# Patient Record
Sex: MALE | Race: WHITE | NOT HISPANIC OR LATINO | Employment: UNEMPLOYED | URBAN - NONMETROPOLITAN AREA
[De-identification: names, ages, dates, MRNs, and addresses within clinical notes are randomized per-mention and may not be internally consistent; named-entity substitution may affect disease eponyms.]

---

## 2017-01-01 ENCOUNTER — LAB REQUISITION (OUTPATIENT)
Dept: LAB | Facility: HOSPITAL | Age: 77
End: 2017-01-01

## 2017-01-01 ENCOUNTER — APPOINTMENT (OUTPATIENT)
Dept: CT IMAGING | Facility: HOSPITAL | Age: 77
End: 2017-01-01

## 2017-01-01 ENCOUNTER — APPOINTMENT (OUTPATIENT)
Dept: ULTRASOUND IMAGING | Facility: HOSPITAL | Age: 77
End: 2017-01-01

## 2017-01-01 ENCOUNTER — ANESTHESIA EVENT (OUTPATIENT)
Dept: PERIOP | Facility: HOSPITAL | Age: 77
End: 2017-01-01

## 2017-01-01 ENCOUNTER — APPOINTMENT (OUTPATIENT)
Dept: GENERAL RADIOLOGY | Facility: HOSPITAL | Age: 77
End: 2017-01-01

## 2017-01-01 ENCOUNTER — HOSPITAL ENCOUNTER (EMERGENCY)
Facility: HOSPITAL | Age: 77
Discharge: HOME | End: 2017-04-23
Attending: EMERGENCY MEDICINE | Admitting: EMERGENCY MEDICINE

## 2017-01-01 ENCOUNTER — OFFICE VISIT (OUTPATIENT)
Dept: ORTHOPEDIC SURGERY | Facility: CLINIC | Age: 77
End: 2017-01-01

## 2017-01-01 ENCOUNTER — HOSPITAL ENCOUNTER (INPATIENT)
Facility: HOSPITAL | Age: 77
LOS: 5 days | Discharge: NURSING, REHAB OR LONG-TERM CARE | End: 2017-08-18
Attending: EMERGENCY MEDICINE | Admitting: FAMILY MEDICINE

## 2017-01-01 ENCOUNTER — ANESTHESIA (OUTPATIENT)
Dept: PERIOP | Facility: HOSPITAL | Age: 77
End: 2017-01-01

## 2017-01-01 ENCOUNTER — HOSPITAL ENCOUNTER (OUTPATIENT)
Dept: CT IMAGING | Facility: HOSPITAL | Age: 77
Discharge: HOME | End: 2017-09-12
Admitting: FAMILY MEDICINE

## 2017-01-01 ENCOUNTER — LAB (OUTPATIENT)
Dept: LAB | Facility: HOSPITAL | Age: 77
End: 2017-01-01

## 2017-01-01 VITALS
SYSTOLIC BLOOD PRESSURE: 118 MMHG | RESPIRATION RATE: 20 BRPM | TEMPERATURE: 97.8 F | BODY MASS INDEX: 31.67 KG/M2 | HEIGHT: 75 IN | HEART RATE: 77 BPM | OXYGEN SATURATION: 94 % | DIASTOLIC BLOOD PRESSURE: 58 MMHG | WEIGHT: 254.7 LBS

## 2017-01-01 VITALS
TEMPERATURE: 97.7 F | SYSTOLIC BLOOD PRESSURE: 218 MMHG | OXYGEN SATURATION: 96 % | RESPIRATION RATE: 20 BRPM | WEIGHT: 246 LBS | HEART RATE: 95 BPM | HEIGHT: 75 IN | DIASTOLIC BLOOD PRESSURE: 107 MMHG | BODY MASS INDEX: 30.59 KG/M2

## 2017-01-01 DIAGNOSIS — N13.30 HYDRONEPHROSIS, BILATERAL: ICD-10-CM

## 2017-01-01 DIAGNOSIS — R33.9 RETENTION OF URINE, UNSPECIFIED: Primary | ICD-10-CM

## 2017-01-01 DIAGNOSIS — Z51.81 ENCOUNTER FOR THERAPEUTIC DRUG LEVEL MONITORING: ICD-10-CM

## 2017-01-01 DIAGNOSIS — R79.82 ELEVATED C-REACTIVE PROTEIN (CRP): ICD-10-CM

## 2017-01-01 DIAGNOSIS — R68.89 OTHER GENERAL SYMPTOMS AND SIGNS: ICD-10-CM

## 2017-01-01 DIAGNOSIS — L03.116 CELLULITIS OF KNEE, LEFT: ICD-10-CM

## 2017-01-01 DIAGNOSIS — R23.8 LOCALIZED WARMTH OF SKIN: ICD-10-CM

## 2017-01-01 DIAGNOSIS — L03.116 CELLULITIS OF KNEE, LEFT: Primary | ICD-10-CM

## 2017-01-01 DIAGNOSIS — R79.89 OTHER SPECIFIED ABNORMAL FINDINGS OF BLOOD CHEMISTRY: ICD-10-CM

## 2017-01-01 DIAGNOSIS — R31.9 HEMATURIA: ICD-10-CM

## 2017-01-01 DIAGNOSIS — E87.1 HYPONATREMIA: ICD-10-CM

## 2017-01-01 DIAGNOSIS — N39.0 URINARY TRACT INFECTION: ICD-10-CM

## 2017-01-01 DIAGNOSIS — M25.462 EFFUSION OF LEFT KNEE: ICD-10-CM

## 2017-01-01 DIAGNOSIS — L03.90 CELLULITIS: ICD-10-CM

## 2017-01-01 DIAGNOSIS — M25.562 PAIN, JOINT, KNEE, LEFT: ICD-10-CM

## 2017-01-01 DIAGNOSIS — T83.511A URINARY TRACT INFECTION ASSOCIATED WITH INDWELLING URETHRAL CATHETER, INITIAL ENCOUNTER (HCC): Primary | ICD-10-CM

## 2017-01-01 DIAGNOSIS — R52 PAIN: ICD-10-CM

## 2017-01-01 DIAGNOSIS — L03.116 CELLULITIS OF LEG, LEFT: Primary | ICD-10-CM

## 2017-01-01 DIAGNOSIS — I10 POORLY-CONTROLLED HYPERTENSION: ICD-10-CM

## 2017-01-01 DIAGNOSIS — J18.9 PNEUMONIA OF BOTH LUNGS DUE TO INFECTIOUS ORGANISM, UNSPECIFIED PART OF LUNG: ICD-10-CM

## 2017-01-01 DIAGNOSIS — L03.116 LEFT LEG CELLULITIS: ICD-10-CM

## 2017-01-01 DIAGNOSIS — N39.0 URINARY TRACT INFECTION ASSOCIATED WITH INDWELLING URETHRAL CATHETER, INITIAL ENCOUNTER (HCC): Primary | ICD-10-CM

## 2017-01-01 DIAGNOSIS — N17.9 ACUTE RENAL FAILURE, UNSPECIFIED ACUTE RENAL FAILURE TYPE (HCC): ICD-10-CM

## 2017-01-01 DIAGNOSIS — L53.9 REDNESS: ICD-10-CM

## 2017-01-01 LAB
ALBUMIN SERPL-MCNC: 3.4 G/DL (ref 3.4–4.8)
ALBUMIN SERPL-MCNC: 4.2 G/DL (ref 3.4–4.8)
ALBUMIN/GLOB SERPL: 0.8 G/DL (ref 1.1–1.8)
ALBUMIN/GLOB SERPL: 1 G/DL (ref 1.1–1.8)
ALP SERPL-CCNC: 195 U/L (ref 38–126)
ALP SERPL-CCNC: 251 U/L (ref 38–126)
ALT SERPL W P-5'-P-CCNC: 27 U/L (ref 21–72)
ALT SERPL W P-5'-P-CCNC: 41 U/L (ref 21–72)
ANION GAP SERPL CALCULATED.3IONS-SCNC: 10 MMOL/L (ref 5–15)
ANION GAP SERPL CALCULATED.3IONS-SCNC: 11 MMOL/L (ref 5–15)
ANION GAP SERPL CALCULATED.3IONS-SCNC: 13 MMOL/L (ref 5–15)
ANION GAP SERPL CALCULATED.3IONS-SCNC: 14 MMOL/L (ref 5–15)
ANION GAP SERPL CALCULATED.3IONS-SCNC: 8 MMOL/L (ref 5–15)
ANION GAP SERPL CALCULATED.3IONS-SCNC: 9 MMOL/L (ref 5–15)
ANION GAP SERPL CALCULATED.3IONS-SCNC: 9 MMOL/L (ref 5–15)
ANISOCYTOSIS BLD QL: NORMAL
APPEARANCE FLD: ABNORMAL
APTT PPP: 42.1 SECONDS (ref 20–40.3)
ARTERIAL PATENCY WRIST A: ABNORMAL
AST SERPL-CCNC: 27 U/L (ref 17–59)
AST SERPL-CCNC: 35 U/L (ref 17–59)
ATMOSPHERIC PRESS: ABNORMAL MMHG
BACTERIA FLD CULT: ABNORMAL
BACTERIA SPEC AEROBE CULT: ABNORMAL
BACTERIA SPEC AEROBE CULT: ABNORMAL
BACTERIA SPEC AEROBE CULT: NORMAL
BACTERIA UR QL AUTO: ABNORMAL /HPF
BACTERIA UR QL AUTO: ABNORMAL /HPF
BASE EXCESS BLDA CALC-SCNC: 0.4 MMOL/L (ref -2.4–2.4)
BASOPHILS # BLD AUTO: 0.01 10*3/MM3 (ref 0–0.2)
BASOPHILS # BLD AUTO: 0.02 10*3/MM3 (ref 0–0.2)
BASOPHILS # BLD AUTO: 0.03 10*3/MM3 (ref 0–0.2)
BASOPHILS # BLD AUTO: 0.04 10*3/MM3 (ref 0–0.2)
BASOPHILS # BLD AUTO: 0.04 10*3/MM3 (ref 0–0.2)
BASOPHILS # BLD AUTO: 0.05 10*3/MM3 (ref 0–0.2)
BASOPHILS # BLD AUTO: 0.05 10*3/MM3 (ref 0–0.2)
BASOPHILS # BLD AUTO: 0.07 10*3/MM3 (ref 0–0.2)
BASOPHILS NFR BLD AUTO: 0.1 % (ref 0–2)
BASOPHILS NFR BLD AUTO: 0.2 % (ref 0–2)
BASOPHILS NFR BLD AUTO: 0.3 % (ref 0–2)
BASOPHILS NFR BLD AUTO: 0.4 % (ref 0–2)
BASOPHILS NFR BLD AUTO: 0.4 % (ref 0–2)
BASOPHILS NFR BLD AUTO: 0.5 % (ref 0–2)
BASOPHILS NFR BLD AUTO: 0.6 % (ref 0–2)
BASOPHILS NFR BLD AUTO: 0.6 % (ref 0–2)
BASOPHILS NFR BLD AUTO: 0.7 % (ref 0–2)
BASOPHILS NFR BLD AUTO: 0.8 % (ref 0–2)
BDY SITE: ABNORMAL
BILIRUB SERPL-MCNC: 0.6 MG/DL (ref 0.2–1.3)
BILIRUB SERPL-MCNC: 0.8 MG/DL (ref 0.2–1.3)
BILIRUB UR QL STRIP: ABNORMAL
BILIRUB UR QL STRIP: NEGATIVE
BUN BLD-MCNC: 27 MG/DL (ref 7–21)
BUN BLD-MCNC: 34 MG/DL (ref 7–21)
BUN BLD-MCNC: 40 MG/DL (ref 7–21)
BUN BLD-MCNC: 48 MG/DL (ref 7–21)
BUN BLD-MCNC: 58 MG/DL (ref 7–21)
BUN BLD-MCNC: 68 MG/DL (ref 7–21)
BUN BLD-MCNC: 69 MG/DL (ref 7–21)
BUN BLD-MCNC: 69 MG/DL (ref 7–21)
BUN BLD-MCNC: 70 MG/DL (ref 7–21)
BUN BLD-MCNC: 71 MG/DL (ref 7–21)
BUN BLD-MCNC: 71 MG/DL (ref 7–21)
BUN BLD-MCNC: 72 MG/DL (ref 7–21)
BUN BLD-MCNC: 73 MG/DL (ref 7–21)
BUN/CREAT SERPL: 18.1 (ref 7–25)
BUN/CREAT SERPL: 18.5 (ref 7–25)
BUN/CREAT SERPL: 19 (ref 7–25)
BUN/CREAT SERPL: 19.9 (ref 7–25)
BUN/CREAT SERPL: 20.7 (ref 7–25)
BUN/CREAT SERPL: 20.8 (ref 7–25)
BUN/CREAT SERPL: 21.6 (ref 7–25)
BUN/CREAT SERPL: 23.8 (ref 7–25)
BUN/CREAT SERPL: 26.6 (ref 7–25)
BUN/CREAT SERPL: 28.2 (ref 7–25)
BUN/CREAT SERPL: 30.6 (ref 7–25)
BUN/CREAT SERPL: 33.7 (ref 7–25)
CA-I BLD-MCNC: 4.6 MG/DL (ref 4.5–4.9)
CALCIUM SPEC-SCNC: 10 MG/DL (ref 8.4–10.2)
CALCIUM SPEC-SCNC: 8.7 MG/DL (ref 8.4–10.2)
CALCIUM SPEC-SCNC: 8.8 MG/DL (ref 8.4–10.2)
CALCIUM SPEC-SCNC: 8.8 MG/DL (ref 8.4–10.2)
CALCIUM SPEC-SCNC: 9.1 MG/DL (ref 8.4–10.2)
CALCIUM SPEC-SCNC: 9.2 MG/DL (ref 8.4–10.2)
CALCIUM SPEC-SCNC: 9.2 MG/DL (ref 8.4–10.2)
CALCIUM SPEC-SCNC: 9.3 MG/DL (ref 8.4–10.2)
CALCIUM SPEC-SCNC: 9.4 MG/DL (ref 8.4–10.2)
CALCIUM SPEC-SCNC: 9.4 MG/DL (ref 8.4–10.2)
CHLORIDE SERPL-SCNC: 101 MMOL/L (ref 95–110)
CHLORIDE SERPL-SCNC: 103 MMOL/L (ref 95–110)
CHLORIDE SERPL-SCNC: 104 MMOL/L (ref 95–110)
CHLORIDE SERPL-SCNC: 84 MMOL/L (ref 95–110)
CHLORIDE SERPL-SCNC: 89 MMOL/L (ref 95–110)
CHLORIDE SERPL-SCNC: 92 MMOL/L (ref 95–110)
CHLORIDE SERPL-SCNC: 95 MMOL/L (ref 95–110)
CHLORIDE SERPL-SCNC: 95 MMOL/L (ref 95–110)
CHLORIDE SERPL-SCNC: 96 MMOL/L (ref 95–110)
CHLORIDE SERPL-SCNC: 99 MMOL/L (ref 95–110)
CK MB SERPL-CCNC: 0.84 NG/ML (ref 0–5)
CK SERPL-CCNC: 25 U/L (ref 55–170)
CLARITY UR: ABNORMAL
CLUMPED PLATELETS: PRESENT
CO2 BLDA-SCNC: 26 MMOL/L (ref 23–27)
CO2 SERPL-SCNC: 19 MMOL/L (ref 22–31)
CO2 SERPL-SCNC: 23 MMOL/L (ref 22–31)
CO2 SERPL-SCNC: 24 MMOL/L (ref 22–31)
CO2 SERPL-SCNC: 24 MMOL/L (ref 22–31)
CO2 SERPL-SCNC: 25 MMOL/L (ref 22–31)
CO2 SERPL-SCNC: 26 MMOL/L (ref 22–31)
CO2 SERPL-SCNC: 26 MMOL/L (ref 22–31)
CO2 SERPL-SCNC: 27 MMOL/L (ref 22–31)
COLOR FLD: ABNORMAL
COLOR UR: ABNORMAL
COLOR UR: YELLOW
CRE SCREEN PCR: NOT DETECTED
CREAT BLD-MCNC: 1.01 MG/DL (ref 0.7–1.3)
CREAT BLD-MCNC: 1.08 MG/DL (ref 0.7–1.3)
CREAT BLD-MCNC: 1.42 MG/DL (ref 0.7–1.3)
CREAT BLD-MCNC: 1.57 MG/DL (ref 0.7–1.3)
CREAT BLD-MCNC: 2.18 MG/DL (ref 0.7–1.3)
CREAT BLD-MCNC: 2.9 MG/DL (ref 0.7–1.3)
CREAT BLD-MCNC: 3.19 MG/DL (ref 0.7–1.3)
CREAT BLD-MCNC: 3.42 MG/DL (ref 0.7–1.3)
CREAT BLD-MCNC: 3.52 MG/DL (ref 0.7–1.3)
CREAT BLD-MCNC: 3.57 MG/DL (ref 0.7–1.3)
CREAT BLD-MCNC: 3.75 MG/DL (ref 0.7–1.3)
CREAT BLD-MCNC: 3.78 MG/DL (ref 0.7–1.3)
CREAT BLD-MCNC: 3.79 MG/DL (ref 0.7–1.3)
CREAT UR-MCNC: 40.7 MG/DL
CRP SERPL-MCNC: 14.1 MG/DL (ref 0–1)
CRP SERPL-MCNC: 14.5 MG/DL (ref 0–1)
CRP SERPL-MCNC: 15.8 MG/DL (ref 0–1)
CRP SERPL-MCNC: 20.8 MG/DL (ref 0–1)
CRP SERPL-MCNC: 32 MG/DL (ref 0–1)
CRP SERPL-MCNC: 32.9 MG/DL (ref 0–1)
CRP SERPL-MCNC: 6.9 MG/DL (ref 0–1)
CRYSTALS FLD MICRO: NORMAL
D-LACTATE SERPL-SCNC: 0.8 MMOL/L (ref 0.5–2)
DEPRECATED RDW RBC AUTO: 48.8 FL (ref 35.1–43.9)
DEPRECATED RDW RBC AUTO: 50 FL (ref 35.1–43.9)
DEPRECATED RDW RBC AUTO: 50.2 FL (ref 35.1–43.9)
DEPRECATED RDW RBC AUTO: 50.3 FL (ref 35.1–43.9)
DEPRECATED RDW RBC AUTO: 52.5 FL (ref 35.1–43.9)
DEPRECATED RDW RBC AUTO: 53.2 FL (ref 35.1–43.9)
DEPRECATED RDW RBC AUTO: 54.9 FL (ref 35.1–43.9)
DEPRECATED RDW RBC AUTO: 55.2 FL (ref 35.1–43.9)
DEPRECATED RDW RBC AUTO: 55.3 FL (ref 35.1–43.9)
DEPRECATED RDW RBC AUTO: 55.9 FL (ref 35.1–43.9)
DEPRECATED RDW RBC AUTO: 56.3 FL (ref 35.1–43.9)
DEPRECATED RDW RBC AUTO: 58.4 FL (ref 35.1–43.9)
EOSINOPHIL # BLD AUTO: 0.01 10*3/MM3 (ref 0–0.7)
EOSINOPHIL # BLD AUTO: 0.01 10*3/MM3 (ref 0–0.7)
EOSINOPHIL # BLD AUTO: 0.04 10*3/MM3 (ref 0–0.7)
EOSINOPHIL # BLD AUTO: 0.21 10*3/MM3 (ref 0–0.7)
EOSINOPHIL # BLD AUTO: 0.21 10*3/MM3 (ref 0–0.7)
EOSINOPHIL # BLD AUTO: 0.23 10*3/MM3 (ref 0–0.7)
EOSINOPHIL # BLD AUTO: 0.27 10*3/MM3 (ref 0–0.7)
EOSINOPHIL # BLD AUTO: 0.3 10*3/MM3 (ref 0–0.7)
EOSINOPHIL # BLD AUTO: 0.31 10*3/MM3 (ref 0–0.7)
EOSINOPHIL # BLD AUTO: 0.32 10*3/MM3 (ref 0–0.7)
EOSINOPHIL # BLD AUTO: 0.34 10*3/MM3 (ref 0–0.7)
EOSINOPHIL # BLD AUTO: 0.39 10*3/MM3 (ref 0–0.7)
EOSINOPHIL NFR BLD AUTO: 0.1 % (ref 0–7)
EOSINOPHIL NFR BLD AUTO: 0.1 % (ref 0–7)
EOSINOPHIL NFR BLD AUTO: 0.5 % (ref 0–7)
EOSINOPHIL NFR BLD AUTO: 2 % (ref 0–7)
EOSINOPHIL NFR BLD AUTO: 2.2 % (ref 0–7)
EOSINOPHIL NFR BLD AUTO: 2.9 % (ref 0–7)
EOSINOPHIL NFR BLD AUTO: 3.2 % (ref 0–7)
EOSINOPHIL NFR BLD AUTO: 3.8 % (ref 0–7)
EOSINOPHIL NFR BLD AUTO: 4.2 % (ref 0–7)
EOSINOPHIL NFR BLD AUTO: 4.3 % (ref 0–7)
EOSINOPHIL NFR BLD AUTO: 4.9 % (ref 0–7)
EOSINOPHIL NFR BLD AUTO: 6 % (ref 0–7)
ERYTHROCYTE [DISTWIDTH] IN BLOOD BY AUTOMATED COUNT: 16.3 % (ref 11.5–14.5)
ERYTHROCYTE [DISTWIDTH] IN BLOOD BY AUTOMATED COUNT: 16.6 % (ref 11.5–14.5)
ERYTHROCYTE [DISTWIDTH] IN BLOOD BY AUTOMATED COUNT: 17.2 % (ref 11.5–14.5)
ERYTHROCYTE [DISTWIDTH] IN BLOOD BY AUTOMATED COUNT: 17.3 % (ref 11.5–14.5)
ERYTHROCYTE [DISTWIDTH] IN BLOOD BY AUTOMATED COUNT: 17.3 % (ref 11.5–14.5)
ERYTHROCYTE [DISTWIDTH] IN BLOOD BY AUTOMATED COUNT: 17.6 % (ref 11.5–14.5)
ERYTHROCYTE [DISTWIDTH] IN BLOOD BY AUTOMATED COUNT: 17.7 % (ref 11.5–14.5)
ERYTHROCYTE [DISTWIDTH] IN BLOOD BY AUTOMATED COUNT: 18 % (ref 11.5–14.5)
ERYTHROCYTE [DISTWIDTH] IN BLOOD BY AUTOMATED COUNT: 18.3 % (ref 11.5–14.5)
ERYTHROCYTE [DISTWIDTH] IN BLOOD BY AUTOMATED COUNT: 18.3 % (ref 11.5–14.5)
ERYTHROCYTE [DISTWIDTH] IN BLOOD BY AUTOMATED COUNT: 18.4 % (ref 11.5–14.5)
ERYTHROCYTE [DISTWIDTH] IN BLOOD BY AUTOMATED COUNT: 19.2 % (ref 11.5–14.5)
ERYTHROCYTE [SEDIMENTATION RATE] IN BLOOD: 125 MM/HR (ref 0–15)
FERRITIN SERPL-MCNC: 175 NG/ML (ref 17.9–464)
FOLATE SERPL-MCNC: 16.2 NG/ML (ref 2.76–21)
GFR SERPL CREATININE-BSD FRML MDRD: 15 ML/MIN/1.73 (ref 60–98)
GFR SERPL CREATININE-BSD FRML MDRD: 16 ML/MIN/1.73 (ref 42–98)
GFR SERPL CREATININE-BSD FRML MDRD: 16 ML/MIN/1.73 (ref 42–98)
GFR SERPL CREATININE-BSD FRML MDRD: 17 ML/MIN/1.73 (ref 42–98)
GFR SERPL CREATININE-BSD FRML MDRD: 19 ML/MIN/1.73 (ref 42–98)
GFR SERPL CREATININE-BSD FRML MDRD: 21 ML/MIN/1.73 (ref 42–98)
GFR SERPL CREATININE-BSD FRML MDRD: 29 ML/MIN/1.73 (ref 42–98)
GFR SERPL CREATININE-BSD FRML MDRD: 43 ML/MIN/1.73 (ref 60–98)
GFR SERPL CREATININE-BSD FRML MDRD: 48 ML/MIN/1.73 (ref 42–98)
GFR SERPL CREATININE-BSD FRML MDRD: 66 ML/MIN/1.73 (ref 42–98)
GFR SERPL CREATININE-BSD FRML MDRD: 71 ML/MIN/1.73 (ref 60–98)
GLOBULIN UR ELPH-MCNC: 4.1 GM/DL (ref 2.3–3.5)
GLOBULIN UR ELPH-MCNC: 4.4 GM/DL (ref 2.3–3.5)
GLUCOSE BLD-MCNC: 112 MG/DL (ref 60–100)
GLUCOSE BLD-MCNC: 116 MG/DL (ref 60–100)
GLUCOSE BLD-MCNC: 136 MG/DL (ref 60–100)
GLUCOSE BLD-MCNC: 143 MG/DL (ref 60–100)
GLUCOSE BLD-MCNC: 144 MG/DL (ref 60–100)
GLUCOSE BLD-MCNC: 150 MG/DL (ref 60–100)
GLUCOSE BLD-MCNC: 166 MG/DL (ref 60–100)
GLUCOSE BLD-MCNC: 170 MG/DL (ref 60–100)
GLUCOSE BLD-MCNC: 170 MG/DL (ref 60–100)
GLUCOSE BLD-MCNC: 92 MG/DL (ref 60–100)
GLUCOSE BLD-MCNC: 96 MG/DL (ref 60–100)
GLUCOSE BLD-MCNC: 97 MG/DL (ref 60–100)
GLUCOSE BLDA-MCNC: 138 MMOL/L
GLUCOSE UR STRIP-MCNC: NEGATIVE MG/DL
GRAM STN SPEC: ABNORMAL
GRAM STN SPEC: ABNORMAL
HCO3 BLDA-SCNC: 24.8 MMOL/L (ref 22–26)
HCT VFR BLD AUTO: 32.4 % (ref 39–49)
HCT VFR BLD AUTO: 32.8 % (ref 39–49)
HCT VFR BLD AUTO: 33.1 % (ref 39–49)
HCT VFR BLD AUTO: 33.3 % (ref 39–49)
HCT VFR BLD AUTO: 33.7 % (ref 39–49)
HCT VFR BLD AUTO: 34.8 % (ref 39–49)
HCT VFR BLD AUTO: 35.1 % (ref 39–49)
HCT VFR BLD AUTO: 35.1 % (ref 39–49)
HCT VFR BLD AUTO: 36.4 % (ref 39–49)
HCT VFR BLD AUTO: 36.8 % (ref 39–49)
HCT VFR BLD AUTO: 38 % (ref 39–49)
HCT VFR BLD AUTO: 42.5 % (ref 39–49)
HCT VFR BLD CALC: 36 % (ref 40–54)
HGB BLD-MCNC: 10.3 G/DL (ref 13.7–17.3)
HGB BLD-MCNC: 10.9 G/DL (ref 13.7–17.3)
HGB BLD-MCNC: 11.5 G/DL (ref 13.7–17.3)
HGB BLD-MCNC: 11.6 G/DL (ref 13.7–17.3)
HGB BLD-MCNC: 11.9 G/DL (ref 13.7–17.3)
HGB BLD-MCNC: 12 G/DL (ref 13.7–17.3)
HGB BLD-MCNC: 12.5 G/DL (ref 13.7–17.3)
HGB BLD-MCNC: 14 G/DL (ref 13.7–17.3)
HGB BLDA-MCNC: 12.4 G/DL (ref 14–18)
HGB UR QL STRIP.AUTO: ABNORMAL
HOLD SPECIMEN: NORMAL
HYALINE CASTS UR QL AUTO: ABNORMAL /LPF
HYALINE CASTS UR QL AUTO: ABNORMAL /LPF
HYPOCHROMIA BLD QL: NORMAL
HYPOCHROMIA BLD QL: NORMAL
IMM GRANULOCYTES # BLD: 0.01 10*3/MM3 (ref 0–0.02)
IMM GRANULOCYTES # BLD: 0.02 10*3/MM3 (ref 0–0.02)
IMM GRANULOCYTES # BLD: 0.03 10*3/MM3 (ref 0–0.02)
IMM GRANULOCYTES # BLD: 0.05 10*3/MM3 (ref 0–0.02)
IMM GRANULOCYTES # BLD: 0.11 10*3/MM3 (ref 0–0.02)
IMM GRANULOCYTES NFR BLD: 0.1 % (ref 0–0.5)
IMM GRANULOCYTES NFR BLD: 0.1 % (ref 0–0.5)
IMM GRANULOCYTES NFR BLD: 0.2 % (ref 0–0.5)
IMM GRANULOCYTES NFR BLD: 0.3 % (ref 0–0.5)
IMM GRANULOCYTES NFR BLD: 0.3 % (ref 0–0.5)
IMM GRANULOCYTES NFR BLD: 0.4 % (ref 0–0.5)
IMM GRANULOCYTES NFR BLD: 0.4 % (ref 0–0.5)
IMM GRANULOCYTES NFR BLD: 1.2 % (ref 0–0.5)
IMP STRAIN: NOT DETECTED
INR PPP: 1.09 (ref 0.8–1.2)
IRON 24H UR-MRATE: 28 MCG/DL (ref 49–181)
IRON SATN MFR SERPL: 15 % (ref 20–55)
KETONES UR QL STRIP: ABNORMAL
KETONES UR QL STRIP: NEGATIVE
KPC STRAIN: NOT DETECTED
LARGE PLATELETS: NORMAL
LEUKOCYTE ESTERASE UR QL STRIP.AUTO: ABNORMAL
LIPASE SERPL-CCNC: 49 U/L (ref 23–300)
LYMPHOCYTES # BLD AUTO: 0.59 10*3/MM3 (ref 0.6–4.2)
LYMPHOCYTES # BLD AUTO: 0.68 10*3/MM3 (ref 0.6–4.2)
LYMPHOCYTES # BLD AUTO: 0.71 10*3/MM3 (ref 0.6–4.2)
LYMPHOCYTES # BLD AUTO: 0.88 10*3/MM3 (ref 0.6–4.2)
LYMPHOCYTES # BLD AUTO: 0.94 10*3/MM3 (ref 0.6–4.2)
LYMPHOCYTES # BLD AUTO: 1 10*3/MM3 (ref 0.6–4.2)
LYMPHOCYTES # BLD AUTO: 1.07 10*3/MM3 (ref 0.6–4.2)
LYMPHOCYTES # BLD AUTO: 1.13 10*3/MM3 (ref 0.6–4.2)
LYMPHOCYTES # BLD AUTO: 1.31 10*3/MM3 (ref 0.6–4.2)
LYMPHOCYTES # BLD AUTO: 1.38 10*3/MM3 (ref 0.6–4.2)
LYMPHOCYTES # BLD AUTO: 1.57 10*3/MM3 (ref 0.6–4.2)
LYMPHOCYTES # BLD AUTO: 1.68 10*3/MM3 (ref 0.6–4.2)
LYMPHOCYTES NFR BLD AUTO: 14.4 % (ref 10–50)
LYMPHOCYTES NFR BLD AUTO: 14.5 % (ref 10–50)
LYMPHOCYTES NFR BLD AUTO: 15.5 % (ref 10–50)
LYMPHOCYTES NFR BLD AUTO: 15.7 % (ref 10–50)
LYMPHOCYTES NFR BLD AUTO: 17.4 % (ref 10–50)
LYMPHOCYTES NFR BLD AUTO: 20.4 % (ref 10–50)
LYMPHOCYTES NFR BLD AUTO: 22.6 % (ref 10–50)
LYMPHOCYTES NFR BLD AUTO: 4.9 % (ref 10–50)
LYMPHOCYTES NFR BLD AUTO: 6.6 % (ref 10–50)
LYMPHOCYTES NFR BLD AUTO: 8.4 % (ref 10–50)
LYMPHOCYTES NFR BLD AUTO: 8.5 % (ref 10–50)
LYMPHOCYTES NFR BLD AUTO: 8.7 % (ref 10–50)
MCH RBC QN AUTO: 25.8 PG (ref 26.5–34)
MCH RBC QN AUTO: 26 PG (ref 26.5–34)
MCH RBC QN AUTO: 26 PG (ref 26.5–34)
MCH RBC QN AUTO: 26.1 PG (ref 26.5–34)
MCH RBC QN AUTO: 26.2 PG (ref 26.5–34)
MCH RBC QN AUTO: 26.5 PG (ref 26.5–34)
MCH RBC QN AUTO: 26.6 PG (ref 26.5–34)
MCH RBC QN AUTO: 26.7 PG (ref 26.5–34)
MCH RBC QN AUTO: 27 PG (ref 26.5–34)
MCH RBC QN AUTO: 27.1 PG (ref 26.5–34)
MCH RBC QN AUTO: 27.3 PG (ref 26.5–34)
MCH RBC QN AUTO: 28.2 PG (ref 26.5–34)
MCHC RBC AUTO-ENTMCNC: 30.9 G/DL (ref 31.5–36.3)
MCHC RBC AUTO-ENTMCNC: 31.1 G/DL (ref 31.5–36.3)
MCHC RBC AUTO-ENTMCNC: 31.1 G/DL (ref 31.5–36.3)
MCHC RBC AUTO-ENTMCNC: 31.3 G/DL (ref 31.5–36.3)
MCHC RBC AUTO-ENTMCNC: 31.8 G/DL (ref 31.5–36.3)
MCHC RBC AUTO-ENTMCNC: 32.6 G/DL (ref 31.5–36.3)
MCHC RBC AUTO-ENTMCNC: 32.7 G/DL (ref 31.5–36.3)
MCHC RBC AUTO-ENTMCNC: 32.8 G/DL (ref 31.5–36.3)
MCHC RBC AUTO-ENTMCNC: 32.9 G/DL (ref 31.5–36.3)
MCHC RBC AUTO-ENTMCNC: 32.9 G/DL (ref 31.5–36.3)
MCHC RBC AUTO-ENTMCNC: 33.2 G/DL (ref 31.5–36.3)
MCHC RBC AUTO-ENTMCNC: 34.4 G/DL (ref 31.5–36.3)
MCV RBC AUTO: 79.6 FL (ref 80–98)
MCV RBC AUTO: 79.7 FL (ref 80–98)
MCV RBC AUTO: 79.8 FL (ref 80–98)
MCV RBC AUTO: 81.3 FL (ref 80–98)
MCV RBC AUTO: 81.4 FL (ref 80–98)
MCV RBC AUTO: 82 FL (ref 80–98)
MCV RBC AUTO: 82 FL (ref 80–98)
MCV RBC AUTO: 82.8 FL (ref 80–98)
MCV RBC AUTO: 83.5 FL (ref 80–98)
MCV RBC AUTO: 85.1 FL (ref 80–98)
MCV RBC AUTO: 85.3 FL (ref 80–98)
MCV RBC AUTO: 87.3 FL (ref 80–98)
MICROCYTES BLD QL: NORMAL
MODALITY: ABNORMAL
MONOCYTES # BLD AUTO: 0.59 10*3/MM3 (ref 0–0.9)
MONOCYTES # BLD AUTO: 0.61 10*3/MM3 (ref 0–0.9)
MONOCYTES # BLD AUTO: 0.63 10*3/MM3 (ref 0–0.9)
MONOCYTES # BLD AUTO: 0.65 10*3/MM3 (ref 0–0.9)
MONOCYTES # BLD AUTO: 0.66 10*3/MM3 (ref 0–0.9)
MONOCYTES # BLD AUTO: 0.67 10*3/MM3 (ref 0–0.9)
MONOCYTES # BLD AUTO: 0.67 10*3/MM3 (ref 0–0.9)
MONOCYTES # BLD AUTO: 0.71 10*3/MM3 (ref 0–0.9)
MONOCYTES # BLD AUTO: 0.85 10*3/MM3 (ref 0–0.9)
MONOCYTES # BLD AUTO: 0.98 10*3/MM3 (ref 0–0.9)
MONOCYTES # BLD AUTO: 1.08 10*3/MM3 (ref 0–0.9)
MONOCYTES # BLD AUTO: 1.14 10*3/MM3 (ref 0–0.9)
MONOCYTES NFR BLD AUTO: 10.1 % (ref 0–12)
MONOCYTES NFR BLD AUTO: 13.1 % (ref 0–12)
MONOCYTES NFR BLD AUTO: 5.6 % (ref 0–12)
MONOCYTES NFR BLD AUTO: 7 % (ref 0–12)
MONOCYTES NFR BLD AUTO: 7.3 % (ref 0–12)
MONOCYTES NFR BLD AUTO: 8.2 % (ref 0–12)
MONOCYTES NFR BLD AUTO: 8.9 % (ref 0–12)
MONOCYTES NFR BLD AUTO: 8.9 % (ref 0–12)
MONOCYTES NFR BLD AUTO: 9.2 % (ref 0–12)
MONOCYTES NFR BLD AUTO: 9.3 % (ref 0–12)
MONOCYTES NFR BLD AUTO: 9.4 % (ref 0–12)
MONOCYTES NFR BLD AUTO: 9.8 % (ref 0–12)
MONOS+MACROS NFR FLD: 10 %
NDM STRAIN: NOT DETECTED
NEUTROPHILS # BLD AUTO: 10.06 10*3/MM3 (ref 2–8.6)
NEUTROPHILS # BLD AUTO: 10.6 10*3/MM3 (ref 2–8.6)
NEUTROPHILS # BLD AUTO: 4.15 10*3/MM3 (ref 2–8.6)
NEUTROPHILS # BLD AUTO: 4.24 10*3/MM3 (ref 2–8.6)
NEUTROPHILS # BLD AUTO: 4.44 10*3/MM3 (ref 2–8.6)
NEUTROPHILS # BLD AUTO: 4.77 10*3/MM3 (ref 2–8.6)
NEUTROPHILS # BLD AUTO: 5.22 10*3/MM3 (ref 2–8.6)
NEUTROPHILS # BLD AUTO: 6.36 10*3/MM3 (ref 2–8.6)
NEUTROPHILS # BLD AUTO: 6.48 10*3/MM3 (ref 2–8.6)
NEUTROPHILS # BLD AUTO: 7.12 10*3/MM3 (ref 2–8.6)
NEUTROPHILS # BLD AUTO: 8.27 10*3/MM3 (ref 2–8.6)
NEUTROPHILS # BLD AUTO: 8.67 10*3/MM3 (ref 2–8.6)
NEUTROPHILS NFR BLD AUTO: 64.3 % (ref 37–80)
NEUTROPHILS NFR BLD AUTO: 64.8 % (ref 37–80)
NEUTROPHILS NFR BLD AUTO: 65.3 % (ref 37–80)
NEUTROPHILS NFR BLD AUTO: 69.6 % (ref 37–80)
NEUTROPHILS NFR BLD AUTO: 70.7 % (ref 37–80)
NEUTROPHILS NFR BLD AUTO: 71.6 % (ref 37–80)
NEUTROPHILS NFR BLD AUTO: 74.5 % (ref 37–80)
NEUTROPHILS NFR BLD AUTO: 78.9 % (ref 37–80)
NEUTROPHILS NFR BLD AUTO: 81 % (ref 37–80)
NEUTROPHILS NFR BLD AUTO: 81.4 % (ref 37–80)
NEUTROPHILS NFR BLD AUTO: 81.6 % (ref 37–80)
NEUTROPHILS NFR BLD AUTO: 88.9 % (ref 37–80)
NEUTROPHILS NFR FLD MANUAL: 90 %
NITRITE UR QL STRIP: NEGATIVE
NRBC BLD MANUAL-RTO: 0 /100 WBC (ref 0–0)
OSMOLALITY SERPL: 286 MOSM/KG (ref 280–290)
OSMOLALITY UR: 265 MOSM/KG (ref 38–1400)
OXA 48 STRAIN: NOT DETECTED
PCO2 BLDA: 39.4 MM HG (ref 35–45)
PH BLDA: 7.42 PH UNITS (ref 7.35–7.45)
PH UR STRIP.AUTO: 6 [PH] (ref 5–9)
PH UR STRIP.AUTO: 6 [PH] (ref 5–9)
PH UR STRIP.AUTO: 7 [PH] (ref 5–9)
PH UR STRIP.AUTO: 7.5 [PH] (ref 5–9)
PLATELET # BLD AUTO: 135 10*3/MM3 (ref 150–450)
PLATELET # BLD AUTO: 152 10*3/MM3 (ref 150–450)
PLATELET # BLD AUTO: 161 10*3/MM3 (ref 150–450)
PLATELET # BLD AUTO: 164 10*3/MM3 (ref 150–450)
PLATELET # BLD AUTO: 177 10*3/MM3 (ref 150–450)
PLATELET # BLD AUTO: 179 10*3/MM3 (ref 150–450)
PLATELET # BLD AUTO: 215 10*3/MM3 (ref 150–450)
PLATELET # BLD AUTO: 216 10*3/MM3 (ref 150–450)
PLATELET # BLD AUTO: 229 10*3/MM3 (ref 150–450)
PLATELET # BLD AUTO: 244 10*3/MM3 (ref 150–450)
PLATELET # BLD AUTO: 290 10*3/MM3 (ref 150–450)
PLATELET # BLD AUTO: 293 10*3/MM3 (ref 150–450)
PMV BLD AUTO: 10 FL (ref 8–12)
PMV BLD AUTO: 10.4 FL (ref 8–12)
PMV BLD AUTO: 10.6 FL (ref 8–12)
PMV BLD AUTO: 10.8 FL (ref 8–12)
PMV BLD AUTO: 11.1 FL (ref 8–12)
PMV BLD AUTO: 11.7 FL (ref 8–12)
PMV BLD AUTO: 11.8 FL (ref 8–12)
PMV BLD AUTO: 9.5 FL (ref 8–12)
PO2 BLDA: 60.6 MM HG (ref 80–105)
POTASSIUM BLD-SCNC: 3.5 MMOL/L (ref 3.5–5.1)
POTASSIUM BLD-SCNC: 3.6 MMOL/L (ref 3.5–5.1)
POTASSIUM BLD-SCNC: 3.9 MMOL/L (ref 3.5–5.1)
POTASSIUM BLD-SCNC: 4 MMOL/L (ref 3.5–5.1)
POTASSIUM BLD-SCNC: 4 MMOL/L (ref 3.5–5.1)
POTASSIUM BLD-SCNC: 4.1 MMOL/L (ref 3.5–5.1)
POTASSIUM BLD-SCNC: 4.1 MMOL/L (ref 3.5–5.1)
POTASSIUM BLD-SCNC: 4.4 MMOL/L (ref 3.5–5.1)
POTASSIUM BLD-SCNC: 4.6 MMOL/L (ref 3.5–5.1)
POTASSIUM BLD-SCNC: 4.7 MMOL/L (ref 3.5–5.1)
POTASSIUM BLD-SCNC: 4.8 MMOL/L (ref 3.5–5.1)
POTASSIUM BLD-SCNC: 5.1 MMOL/L (ref 3.5–5.1)
POTASSIUM BLDA-SCNC: 4.77 MMOL/L (ref 3.6–4.9)
PROT SERPL-MCNC: 7.5 G/DL (ref 6.3–8.6)
PROT SERPL-MCNC: 8.6 G/DL (ref 6.3–8.6)
PROT UR QL STRIP: ABNORMAL
PROTHROMBIN TIME: 14 SECONDS (ref 11.1–15.3)
RBC # BLD AUTO: 3.89 10*6/MM3 (ref 4.37–5.74)
RBC # BLD AUTO: 3.95 10*6/MM3 (ref 4.37–5.74)
RBC # BLD AUTO: 3.99 10*6/MM3 (ref 4.37–5.74)
RBC # BLD AUTO: 4.02 10*6/MM3 (ref 4.37–5.74)
RBC # BLD AUTO: 4.03 10*6/MM3 (ref 4.37–5.74)
RBC # BLD AUTO: 4.08 10*6/MM3 (ref 4.37–5.74)
RBC # BLD AUTO: 4.11 10*6/MM3 (ref 4.37–5.74)
RBC # BLD AUTO: 4.32 10*6/MM3 (ref 4.37–5.74)
RBC # BLD AUTO: 4.57 10*6/MM3 (ref 4.37–5.74)
RBC # BLD AUTO: 4.61 10*6/MM3 (ref 4.37–5.74)
RBC # BLD AUTO: 4.77 10*6/MM3 (ref 4.37–5.74)
RBC # BLD AUTO: 5.13 10*6/MM3 (ref 4.37–5.74)
RBC # FLD AUTO: ABNORMAL /MM3 (ref 0–0)
RBC # UR: ABNORMAL /HPF
RBC # UR: ABNORMAL /HPF
REF LAB TEST METHOD: ABNORMAL
REF LAB TEST METHOD: ABNORMAL
SAO2 % BLDCOA: 91.1 %
SMALL PLATELETS BLD QL SMEAR: ADEQUATE
SODIUM BLD-SCNC: 118 MMOL/L (ref 137–145)
SODIUM BLD-SCNC: 120 MMOL/L (ref 137–145)
SODIUM BLD-SCNC: 125 MMOL/L (ref 137–145)
SODIUM BLD-SCNC: 130 MMOL/L (ref 137–145)
SODIUM BLD-SCNC: 131 MMOL/L (ref 137–145)
SODIUM BLD-SCNC: 131 MMOL/L (ref 137–145)
SODIUM BLD-SCNC: 132 MMOL/L (ref 137–145)
SODIUM BLD-SCNC: 133 MMOL/L (ref 137–145)
SODIUM BLD-SCNC: 135 MMOL/L (ref 137–145)
SODIUM BLD-SCNC: 137 MMOL/L (ref 137–145)
SODIUM BLD-SCNC: 140 MMOL/L (ref 137–145)
SODIUM BLDA-SCNC: 122.7 MMOL/L (ref 138–146)
SODIUM UR-SCNC: 52 MMOL/L (ref 30–90)
SP GR UR STRIP: 1.01 (ref 1–1.03)
SP GR UR STRIP: 1.02 (ref 1–1.03)
SQUAMOUS #/AREA URNS HPF: ABNORMAL /HPF
SQUAMOUS #/AREA URNS HPF: ABNORMAL /HPF
TIBC SERPL-MCNC: 186 MCG/DL (ref 261–462)
TOBRAMYCIN PEAK SERPL-MCNC: 9.21 MCG/ML (ref 5–12)
TOBRAMYCIN TROUGH SERPL-MCNC: 4.16 MCG/ML (ref 0–2)
TRANS CELLS #/AREA URNS HPF: ABNORMAL /HPF
TROPONIN I SERPL-MCNC: 0.03 NG/ML
URATE SERPL-MCNC: 5.5 MG/DL (ref 2.5–8.5)
UROBILINOGEN UR QL STRIP: ABNORMAL
VANCOMYCIN SERPL-MCNC: 18.26 MCG/ML
VANCOMYCIN TROUGH SERPL-MCNC: 14.83 MCG/ML (ref 10–15)
VIM STRAIN: NOT DETECTED
VIT B12 BLD-MCNC: >1000 PG/ML (ref 239–931)
WBC # FLD: ABNORMAL /MM3 (ref 0–5)
WBC MORPH BLD: NORMAL
WBC NRBC COR # BLD: 10.47 10*3/MM3 (ref 3.2–9.8)
WBC NRBC COR # BLD: 10.7 10*3/MM3 (ref 3.2–9.8)
WBC NRBC COR # BLD: 11.93 10*3/MM3 (ref 3.2–9.8)
WBC NRBC COR # BLD: 12.33 10*3/MM3 (ref 3.2–9.8)
WBC NRBC COR # BLD: 6.37 10*3/MM3 (ref 3.2–9.8)
WBC NRBC COR # BLD: 6.41 10*3/MM3 (ref 3.2–9.8)
WBC NRBC COR # BLD: 6.49 10*3/MM3 (ref 3.2–9.8)
WBC NRBC COR # BLD: 7.29 10*3/MM3 (ref 3.2–9.8)
WBC NRBC COR # BLD: 7.43 10*3/MM3 (ref 3.2–9.8)
WBC NRBC COR # BLD: 7.96 10*3/MM3 (ref 3.2–9.8)
WBC NRBC COR # BLD: 9 10*3/MM3 (ref 3.2–9.8)
WBC NRBC COR # BLD: 9.56 10*3/MM3 (ref 3.2–9.8)
WBC UR QL AUTO: ABNORMAL /HPF
WBC UR QL AUTO: ABNORMAL /HPF
WHOLE BLOOD HOLD SPECIMEN: NORMAL

## 2017-01-01 PROCEDURE — 85025 COMPLETE CBC W/AUTO DIFF WBC: CPT | Performed by: FAMILY MEDICINE

## 2017-01-01 PROCEDURE — 94799 UNLISTED PULMONARY SVC/PX: CPT

## 2017-01-01 PROCEDURE — C1769 GUIDE WIRE: HCPCS | Performed by: UROLOGY

## 2017-01-01 PROCEDURE — 80202 ASSAY OF VANCOMYCIN: CPT | Performed by: FAMILY MEDICINE

## 2017-01-01 PROCEDURE — 25010000002 CEFTRIAXONE: Performed by: EMERGENCY MEDICINE

## 2017-01-01 PROCEDURE — C1894 INTRO/SHEATH, NON-LASER: HCPCS | Performed by: UROLOGY

## 2017-01-01 PROCEDURE — 87186 SC STD MICRODIL/AGAR DIL: CPT | Performed by: FAMILY MEDICINE

## 2017-01-01 PROCEDURE — 87077 CULTURE AEROBIC IDENTIFY: CPT | Performed by: NURSE PRACTITIONER

## 2017-01-01 PROCEDURE — 84520 ASSAY OF UREA NITROGEN: CPT | Performed by: FAMILY MEDICINE

## 2017-01-01 PROCEDURE — 25010000002 LEVOFLOXACIN PER 250 MG: Performed by: EMERGENCY MEDICINE

## 2017-01-01 PROCEDURE — 87081 CULTURE SCREEN ONLY: CPT | Performed by: FAMILY MEDICINE

## 2017-01-01 PROCEDURE — 25010000002 MORPHINE PER 10 MG: Performed by: EMERGENCY MEDICINE

## 2017-01-01 PROCEDURE — 94760 N-INVAS EAR/PLS OXIMETRY 1: CPT

## 2017-01-01 PROCEDURE — 25010000002 HYDROMORPHONE PER 4 MG: Performed by: EMERGENCY MEDICINE

## 2017-01-01 PROCEDURE — 81003 URINALYSIS AUTO W/O SCOPE: CPT | Performed by: FAMILY MEDICINE

## 2017-01-01 PROCEDURE — 82550 ASSAY OF CK (CPK): CPT | Performed by: EMERGENCY MEDICINE

## 2017-01-01 PROCEDURE — 87086 URINE CULTURE/COLONY COUNT: CPT | Performed by: FAMILY MEDICINE

## 2017-01-01 PROCEDURE — 25010000002 ONDANSETRON PER 1 MG: Performed by: ANESTHESIOLOGY

## 2017-01-01 PROCEDURE — 84295 ASSAY OF SERUM SODIUM: CPT | Performed by: INTERNAL MEDICINE

## 2017-01-01 PROCEDURE — 25010000002 HEPARIN (PORCINE) PER 1000 UNITS: Performed by: FAMILY MEDICINE

## 2017-01-01 PROCEDURE — 85025 COMPLETE CBC W/AUTO DIFF WBC: CPT | Performed by: EMERGENCY MEDICINE

## 2017-01-01 PROCEDURE — 83935 ASSAY OF URINE OSMOLALITY: CPT | Performed by: INTERNAL MEDICINE

## 2017-01-01 PROCEDURE — 85007 BL SMEAR W/DIFF WBC COUNT: CPT | Performed by: EMERGENCY MEDICINE

## 2017-01-01 PROCEDURE — 84484 ASSAY OF TROPONIN QUANT: CPT | Performed by: EMERGENCY MEDICINE

## 2017-01-01 PROCEDURE — 25010000002 VANCOMYCIN PER 500 MG: Performed by: FAMILY MEDICINE

## 2017-01-01 PROCEDURE — 82746 ASSAY OF FOLIC ACID SERUM: CPT | Performed by: INTERNAL MEDICINE

## 2017-01-01 PROCEDURE — 80048 BASIC METABOLIC PNL TOTAL CA: CPT | Performed by: INTERNAL MEDICINE

## 2017-01-01 PROCEDURE — 86140 C-REACTIVE PROTEIN: CPT | Performed by: FAMILY MEDICINE

## 2017-01-01 PROCEDURE — 85025 COMPLETE CBC W/AUTO DIFF WBC: CPT | Performed by: NURSE PRACTITIONER

## 2017-01-01 PROCEDURE — 82803 BLOOD GASES ANY COMBINATION: CPT | Performed by: EMERGENCY MEDICINE

## 2017-01-01 PROCEDURE — 93971 EXTREMITY STUDY: CPT

## 2017-01-01 PROCEDURE — 85007 BL SMEAR W/DIFF WBC COUNT: CPT | Performed by: FAMILY MEDICINE

## 2017-01-01 PROCEDURE — 96376 TX/PRO/DX INJ SAME DRUG ADON: CPT

## 2017-01-01 PROCEDURE — 87205 SMEAR GRAM STAIN: CPT | Performed by: NURSE PRACTITIONER

## 2017-01-01 PROCEDURE — 87086 URINE CULTURE/COLONY COUNT: CPT | Performed by: NURSE PRACTITIONER

## 2017-01-01 PROCEDURE — 83690 ASSAY OF LIPASE: CPT | Performed by: EMERGENCY MEDICINE

## 2017-01-01 PROCEDURE — 96361 HYDRATE IV INFUSION ADD-ON: CPT

## 2017-01-01 PROCEDURE — 87070 CULTURE OTHR SPECIMN AEROBIC: CPT | Performed by: NURSE PRACTITIONER

## 2017-01-01 PROCEDURE — 87086 URINE CULTURE/COLONY COUNT: CPT | Performed by: UROLOGY

## 2017-01-01 PROCEDURE — 82553 CREATINE MB FRACTION: CPT | Performed by: EMERGENCY MEDICINE

## 2017-01-01 PROCEDURE — 0 IOPAMIDOL 61 % SOLUTION: Performed by: FAMILY MEDICINE

## 2017-01-01 PROCEDURE — 99284 EMERGENCY DEPT VISIT MOD MDM: CPT

## 2017-01-01 PROCEDURE — 89060 EXAM SYNOVIAL FLUID CRYSTALS: CPT | Performed by: NURSE PRACTITIONER

## 2017-01-01 PROCEDURE — 74176 CT ABD & PELVIS W/O CONTRAST: CPT

## 2017-01-01 PROCEDURE — 85730 THROMBOPLASTIN TIME PARTIAL: CPT | Performed by: EMERGENCY MEDICINE

## 2017-01-01 PROCEDURE — 83550 IRON BINDING TEST: CPT | Performed by: INTERNAL MEDICINE

## 2017-01-01 PROCEDURE — 25010000002 ONDANSETRON PER 1 MG: Performed by: EMERGENCY MEDICINE

## 2017-01-01 PROCEDURE — 84550 ASSAY OF BLOOD/URIC ACID: CPT | Performed by: NURSE PRACTITIONER

## 2017-01-01 PROCEDURE — 84300 ASSAY OF URINE SODIUM: CPT | Performed by: INTERNAL MEDICINE

## 2017-01-01 PROCEDURE — 81001 URINALYSIS AUTO W/SCOPE: CPT | Performed by: FAMILY MEDICINE

## 2017-01-01 PROCEDURE — 71010 HC CHEST PA OR AP: CPT

## 2017-01-01 PROCEDURE — 93010 ELECTROCARDIOGRAM REPORT: CPT | Performed by: INTERNAL MEDICINE

## 2017-01-01 PROCEDURE — 82728 ASSAY OF FERRITIN: CPT | Performed by: INTERNAL MEDICINE

## 2017-01-01 PROCEDURE — 82607 VITAMIN B-12: CPT | Performed by: INTERNAL MEDICINE

## 2017-01-01 PROCEDURE — 87040 BLOOD CULTURE FOR BACTERIA: CPT | Performed by: EMERGENCY MEDICINE

## 2017-01-01 PROCEDURE — 87186 SC STD MICRODIL/AGAR DIL: CPT | Performed by: NURSE PRACTITIONER

## 2017-01-01 PROCEDURE — 96365 THER/PROPH/DIAG IV INF INIT: CPT

## 2017-01-01 PROCEDURE — 87077 CULTURE AEROBIC IDENTIFY: CPT | Performed by: FAMILY MEDICINE

## 2017-01-01 PROCEDURE — 25010000002 PROPOFOL 10 MG/ML EMULSION: Performed by: ANESTHESIOLOGY

## 2017-01-01 PROCEDURE — 82570 ASSAY OF URINE CREATININE: CPT | Performed by: INTERNAL MEDICINE

## 2017-01-01 PROCEDURE — 73562 X-RAY EXAM OF KNEE 3: CPT

## 2017-01-01 PROCEDURE — 25010000002 MORPHINE PER 10 MG: Performed by: FAMILY MEDICINE

## 2017-01-01 PROCEDURE — 87015 SPECIMEN INFECT AGNT CONCNTJ: CPT | Performed by: NURSE PRACTITIONER

## 2017-01-01 PROCEDURE — 0T9B80Z DRAINAGE OF BLADDER WITH DRAINAGE DEVICE, VIA NATURAL OR ARTIFICIAL OPENING ENDOSCOPIC: ICD-10-PCS | Performed by: UROLOGY

## 2017-01-01 PROCEDURE — 85610 PROTHROMBIN TIME: CPT | Performed by: EMERGENCY MEDICINE

## 2017-01-01 PROCEDURE — 25010000002 VANCOMYCIN PER 500 MG: Performed by: EMERGENCY MEDICINE

## 2017-01-01 PROCEDURE — 86140 C-REACTIVE PROTEIN: CPT | Performed by: NURSE PRACTITIONER

## 2017-01-01 PROCEDURE — 83930 ASSAY OF BLOOD OSMOLALITY: CPT | Performed by: INTERNAL MEDICINE

## 2017-01-01 PROCEDURE — 99285 EMERGENCY DEPT VISIT HI MDM: CPT

## 2017-01-01 PROCEDURE — 83540 ASSAY OF IRON: CPT | Performed by: INTERNAL MEDICINE

## 2017-01-01 PROCEDURE — 76770 US EXAM ABDO BACK WALL COMP: CPT

## 2017-01-01 PROCEDURE — 80200 ASSAY OF TOBRAMYCIN: CPT | Performed by: FAMILY MEDICINE

## 2017-01-01 PROCEDURE — 73701 CT LOWER EXTREMITY W/DYE: CPT

## 2017-01-01 PROCEDURE — 83605 ASSAY OF LACTIC ACID: CPT | Performed by: EMERGENCY MEDICINE

## 2017-01-01 PROCEDURE — 99213 OFFICE O/P EST LOW 20 MIN: CPT | Performed by: NURSE PRACTITIONER

## 2017-01-01 PROCEDURE — 80053 COMPREHEN METABOLIC PANEL: CPT | Performed by: EMERGENCY MEDICINE

## 2017-01-01 PROCEDURE — 93005 ELECTROCARDIOGRAM TRACING: CPT | Performed by: EMERGENCY MEDICINE

## 2017-01-01 PROCEDURE — 36415 COLL VENOUS BLD VENIPUNCTURE: CPT

## 2017-01-01 PROCEDURE — 36600 WITHDRAWAL OF ARTERIAL BLOOD: CPT

## 2017-01-01 PROCEDURE — 85651 RBC SED RATE NONAUTOMATED: CPT | Performed by: NURSE PRACTITIONER

## 2017-01-01 PROCEDURE — 99203 OFFICE O/P NEW LOW 30 MIN: CPT | Performed by: NURSE PRACTITIONER

## 2017-01-01 PROCEDURE — 89051 BODY FLUID CELL COUNT: CPT | Performed by: NURSE PRACTITIONER

## 2017-01-01 PROCEDURE — 25010000002 MORPHINE PER 10 MG: Performed by: UROLOGY

## 2017-01-01 PROCEDURE — 82565 ASSAY OF CREATININE: CPT | Performed by: FAMILY MEDICINE

## 2017-01-01 PROCEDURE — 96375 TX/PRO/DX INJ NEW DRUG ADDON: CPT

## 2017-01-01 RX ORDER — BUDESONIDE AND FORMOTEROL FUMARATE DIHYDRATE 160; 4.5 UG/1; UG/1
2 AEROSOL RESPIRATORY (INHALATION)
Qty: 1 INHALER | Refills: 0 | Status: SHIPPED | OUTPATIENT
Start: 2017-01-01

## 2017-01-01 RX ORDER — CLONIDINE HYDROCHLORIDE 0.2 MG/1
0.2 TABLET ORAL WEEKLY
Status: DISCONTINUED | OUTPATIENT
Start: 2017-01-01 | End: 2017-01-01

## 2017-01-01 RX ORDER — LABETALOL HYDROCHLORIDE 5 MG/ML
40 INJECTION, SOLUTION INTRAVENOUS ONCE
Status: COMPLETED | OUTPATIENT
Start: 2017-01-01 | End: 2017-01-01

## 2017-01-01 RX ORDER — EPHEDRINE SULFATE 50 MG/ML
5 INJECTION, SOLUTION INTRAVENOUS ONCE AS NEEDED
Status: DISCONTINUED | OUTPATIENT
Start: 2017-01-01 | End: 2017-01-01 | Stop reason: HOSPADM

## 2017-01-01 RX ORDER — FLUMAZENIL 0.1 MG/ML
0.2 INJECTION INTRAVENOUS AS NEEDED
Status: DISCONTINUED | OUTPATIENT
Start: 2017-01-01 | End: 2017-01-01 | Stop reason: HOSPADM

## 2017-01-01 RX ORDER — PROMETHAZINE HYDROCHLORIDE 12.5 MG/1
12.5 TABLET ORAL EVERY 6 HOURS PRN
Status: DISCONTINUED | OUTPATIENT
Start: 2017-01-01 | End: 2017-01-01 | Stop reason: HOSPADM

## 2017-01-01 RX ORDER — CLONIDINE HYDROCHLORIDE 0.2 MG/1
0.2 TABLET ORAL ONCE
Status: COMPLETED | OUTPATIENT
Start: 2017-01-01 | End: 2017-01-01

## 2017-01-01 RX ORDER — ALPRAZOLAM 0.25 MG/1
0.5 TABLET ORAL NIGHTLY
Status: DISCONTINUED | OUTPATIENT
Start: 2017-01-01 | End: 2017-01-01 | Stop reason: HOSPADM

## 2017-01-01 RX ORDER — ONDANSETRON 2 MG/ML
4 INJECTION INTRAMUSCULAR; INTRAVENOUS ONCE AS NEEDED
Status: DISCONTINUED | OUTPATIENT
Start: 2017-01-01 | End: 2017-01-01 | Stop reason: HOSPADM

## 2017-01-01 RX ORDER — ACETAMINOPHEN 325 MG/1
650 TABLET ORAL ONCE AS NEEDED
Status: DISCONTINUED | OUTPATIENT
Start: 2017-01-01 | End: 2017-01-01 | Stop reason: HOSPADM

## 2017-01-01 RX ORDER — SALIVA STIMULANT COMB. NO.7
GEL (GRAM) MUCOUS MEMBRANE 3 TIMES DAILY PRN
COMMUNITY

## 2017-01-01 RX ORDER — MORPHINE SULFATE 4 MG/ML
1 INJECTION, SOLUTION INTRAMUSCULAR; INTRAVENOUS EVERY 4 HOURS PRN
Status: DISCONTINUED | OUTPATIENT
Start: 2017-01-01 | End: 2017-01-01

## 2017-01-01 RX ORDER — MORPHINE SULFATE 4 MG/ML
1 INJECTION, SOLUTION INTRAMUSCULAR; INTRAVENOUS EVERY 4 HOURS PRN
Status: DISCONTINUED | OUTPATIENT
Start: 2017-01-01 | End: 2017-01-01 | Stop reason: SDUPTHER

## 2017-01-01 RX ORDER — AMLODIPINE BESYLATE 5 MG/1
5 TABLET ORAL DAILY
Status: DISCONTINUED | OUTPATIENT
Start: 2017-01-01 | End: 2017-01-01 | Stop reason: HOSPADM

## 2017-01-01 RX ORDER — PROMETHAZINE HYDROCHLORIDE 25 MG/1
25 TABLET ORAL EVERY 4 HOURS
Status: DISCONTINUED | OUTPATIENT
Start: 2017-01-01 | End: 2017-01-01

## 2017-01-01 RX ORDER — FLUOXETINE HYDROCHLORIDE 20 MG/1
40 CAPSULE ORAL 2 TIMES DAILY
Status: DISCONTINUED | OUTPATIENT
Start: 2017-01-01 | End: 2017-01-01

## 2017-01-01 RX ORDER — PROMETHAZINE HYDROCHLORIDE 25 MG/ML
12.5 INJECTION, SOLUTION INTRAMUSCULAR; INTRAVENOUS EVERY 6 HOURS PRN
Status: DISCONTINUED | OUTPATIENT
Start: 2017-01-01 | End: 2017-01-01 | Stop reason: HOSPADM

## 2017-01-01 RX ORDER — MORPHINE SULFATE 2 MG/ML
1 INJECTION, SOLUTION INTRAMUSCULAR; INTRAVENOUS EVERY 4 HOURS PRN
Status: DISCONTINUED | OUTPATIENT
Start: 2017-01-01 | End: 2017-01-01

## 2017-01-01 RX ORDER — FAMOTIDINE 40 MG/1
40 TABLET, FILM COATED ORAL DAILY
Status: DISCONTINUED | OUTPATIENT
Start: 2017-01-01 | End: 2017-01-01

## 2017-01-01 RX ORDER — SODIUM CHLORIDE 9 MG/ML
75 INJECTION, SOLUTION INTRAVENOUS CONTINUOUS
Status: DISCONTINUED | OUTPATIENT
Start: 2017-01-01 | End: 2017-01-01

## 2017-01-01 RX ORDER — NORTRIPTYLINE HYDROCHLORIDE 50 MG/1
50 CAPSULE ORAL NIGHTLY
Status: DISCONTINUED | OUTPATIENT
Start: 2017-01-01 | End: 2017-01-01

## 2017-01-01 RX ORDER — ONDANSETRON 2 MG/ML
4 INJECTION INTRAMUSCULAR; INTRAVENOUS ONCE
Status: COMPLETED | OUTPATIENT
Start: 2017-01-01 | End: 2017-01-01

## 2017-01-01 RX ORDER — NORTRIPTYLINE HYDROCHLORIDE 50 MG/1
50 CAPSULE ORAL NIGHTLY
COMMUNITY

## 2017-01-01 RX ORDER — MORPHINE SULFATE 4 MG/ML
4 INJECTION, SOLUTION INTRAMUSCULAR; INTRAVENOUS ONCE
Status: COMPLETED | OUTPATIENT
Start: 2017-01-01 | End: 2017-01-01

## 2017-01-01 RX ORDER — CLONIDINE HYDROCHLORIDE 0.2 MG/1
0.2 TABLET ORAL WEEKLY
COMMUNITY

## 2017-01-01 RX ORDER — NORTRIPTYLINE HYDROCHLORIDE 50 MG/1
50 CAPSULE ORAL NIGHTLY
Status: DISCONTINUED | OUTPATIENT
Start: 2017-01-01 | End: 2017-01-01 | Stop reason: HOSPADM

## 2017-01-01 RX ORDER — TAMSULOSIN HYDROCHLORIDE 0.4 MG/1
1 CAPSULE ORAL DAILY
Qty: 30 CAPSULE | Refills: 0 | Status: SHIPPED | OUTPATIENT
Start: 2017-01-01

## 2017-01-01 RX ORDER — MORPHINE SULFATE 2 MG/ML
0.5 INJECTION, SOLUTION INTRAMUSCULAR; INTRAVENOUS EVERY 4 HOURS PRN
Status: DISCONTINUED | OUTPATIENT
Start: 2017-01-01 | End: 2017-01-01

## 2017-01-01 RX ORDER — AMLODIPINE BESYLATE 10 MG/1
10 TABLET ORAL ONCE
Status: COMPLETED | OUTPATIENT
Start: 2017-01-01 | End: 2017-01-01

## 2017-01-01 RX ORDER — SODIUM CHLORIDE 0.9 % (FLUSH) 0.9 %
10 SYRINGE (ML) INJECTION AS NEEDED
Status: DISCONTINUED | OUTPATIENT
Start: 2017-01-01 | End: 2017-01-01 | Stop reason: HOSPADM

## 2017-01-01 RX ORDER — NALOXONE HCL 0.4 MG/ML
0.2 VIAL (ML) INJECTION AS NEEDED
Status: DISCONTINUED | OUTPATIENT
Start: 2017-01-01 | End: 2017-01-01

## 2017-01-01 RX ORDER — TAMSULOSIN HYDROCHLORIDE 0.4 MG/1
0.4 CAPSULE ORAL DAILY
Status: DISCONTINUED | OUTPATIENT
Start: 2017-01-01 | End: 2017-01-01 | Stop reason: HOSPADM

## 2017-01-01 RX ORDER — NALOXONE HCL 0.4 MG/ML
0.4 VIAL (ML) INJECTION
Status: DISCONTINUED | OUTPATIENT
Start: 2017-01-01 | End: 2017-01-01 | Stop reason: HOSPADM

## 2017-01-01 RX ORDER — MORPHINE SULFATE 4 MG/ML
0.5 INJECTION, SOLUTION INTRAMUSCULAR; INTRAVENOUS EVERY 4 HOURS PRN
Status: DISCONTINUED | OUTPATIENT
Start: 2017-01-01 | End: 2017-01-01 | Stop reason: HOSPADM

## 2017-01-01 RX ORDER — ALPRAZOLAM 0.5 MG/1
0.5 TABLET ORAL DAILY
COMMUNITY
End: 2017-01-01

## 2017-01-01 RX ORDER — AMINO ACIDS/PROTEIN HYDROLYS 15G-100/30
LIQUID (ML) ORAL
COMMUNITY

## 2017-01-01 RX ORDER — LEVOFLOXACIN 5 MG/ML
250 INJECTION, SOLUTION INTRAVENOUS EVERY 24 HOURS
Status: DISCONTINUED | OUTPATIENT
Start: 2017-01-01 | End: 2017-01-01

## 2017-01-01 RX ORDER — POLYETHYLENE GLYCOL 3350 17 G/17G
17 POWDER, FOR SOLUTION ORAL DAILY
Status: DISCONTINUED | OUTPATIENT
Start: 2017-01-01 | End: 2017-01-01 | Stop reason: HOSPADM

## 2017-01-01 RX ORDER — SODIUM CHLORIDE 450 MG/100ML
75 INJECTION, SOLUTION INTRAVENOUS CONTINUOUS
Status: DISCONTINUED | OUTPATIENT
Start: 2017-01-01 | End: 2017-01-01

## 2017-01-01 RX ORDER — POLYETHYLENE GLYCOL 3350 17 G/17G
17 POWDER, FOR SOLUTION ORAL DAILY
Status: DISCONTINUED | OUTPATIENT
Start: 2017-01-01 | End: 2017-01-01

## 2017-01-01 RX ORDER — ACETAMINOPHEN 650 MG/1
650 SUPPOSITORY RECTAL ONCE AS NEEDED
Status: DISCONTINUED | OUTPATIENT
Start: 2017-01-01 | End: 2017-01-01 | Stop reason: HOSPADM

## 2017-01-01 RX ORDER — SODIUM CHLORIDE 0.9 % (FLUSH) 0.9 %
1-10 SYRINGE (ML) INJECTION AS NEEDED
Status: DISCONTINUED | OUTPATIENT
Start: 2017-01-01 | End: 2017-01-01 | Stop reason: HOSPADM

## 2017-01-01 RX ORDER — LEVOFLOXACIN 5 MG/ML
500 INJECTION, SOLUTION INTRAVENOUS
Status: DISCONTINUED | OUTPATIENT
Start: 2017-01-01 | End: 2017-01-01 | Stop reason: HOSPADM

## 2017-01-01 RX ORDER — AMLODIPINE BESYLATE 10 MG/1
10 TABLET ORAL DAILY
COMMUNITY

## 2017-01-01 RX ORDER — NALOXONE HCL 0.4 MG/ML
0.4 VIAL (ML) INJECTION
Status: DISCONTINUED | OUTPATIENT
Start: 2017-01-01 | End: 2017-01-01 | Stop reason: CLARIF

## 2017-01-01 RX ORDER — DESMOPRESSIN ACETATE 0.1 MG/1
100 TABLET ORAL ONCE
Status: COMPLETED | OUTPATIENT
Start: 2017-01-01 | End: 2017-01-01

## 2017-01-01 RX ORDER — LEVOFLOXACIN 500 MG/1
500 TABLET, FILM COATED ORAL DAILY
Qty: 10 TABLET | Refills: 0 | Status: ON HOLD | OUTPATIENT
Start: 2017-01-01 | End: 2017-01-01

## 2017-01-01 RX ORDER — FENTANYL 25 UG/H
1 PATCH TRANSDERMAL
COMMUNITY

## 2017-01-01 RX ORDER — HYDROCODONE BITARTRATE AND ACETAMINOPHEN 10; 325 MG/1; MG/1
1 TABLET ORAL EVERY 4 HOURS PRN
Status: DISCONTINUED | OUTPATIENT
Start: 2017-01-01 | End: 2017-01-01 | Stop reason: HOSPADM

## 2017-01-01 RX ORDER — ALPRAZOLAM 0.25 MG/1
0.25 TABLET ORAL DAILY
Status: DISCONTINUED | OUTPATIENT
Start: 2017-01-01 | End: 2017-01-01 | Stop reason: HOSPADM

## 2017-01-01 RX ORDER — MORPHINE SULFATE 2 MG/ML
1 INJECTION, SOLUTION INTRAMUSCULAR; INTRAVENOUS EVERY 4 HOURS PRN
Status: DISCONTINUED | OUTPATIENT
Start: 2017-01-01 | End: 2017-01-01 | Stop reason: CLARIF

## 2017-01-01 RX ORDER — ALPRAZOLAM 0.25 MG/1
0.25 TABLET ORAL DAILY
COMMUNITY

## 2017-01-01 RX ORDER — ALBUTEROL SULFATE 90 UG/1
2 AEROSOL, METERED RESPIRATORY (INHALATION) EVERY 4 HOURS PRN
COMMUNITY

## 2017-01-01 RX ORDER — PROMETHAZINE HYDROCHLORIDE 25 MG/1
25 TABLET ORAL EVERY 4 HOURS
COMMUNITY

## 2017-01-01 RX ORDER — FLUOXETINE HYDROCHLORIDE 20 MG/1
40 CAPSULE ORAL 2 TIMES DAILY
Status: DISCONTINUED | OUTPATIENT
Start: 2017-01-01 | End: 2017-01-01 | Stop reason: HOSPADM

## 2017-01-01 RX ORDER — ALPRAZOLAM 0.25 MG/1
0.5 TABLET ORAL DAILY
Status: DISCONTINUED | OUTPATIENT
Start: 2017-01-01 | End: 2017-01-01

## 2017-01-01 RX ORDER — POLYETHYLENE GLYCOL 3350 17 G/17G
17 POWDER, FOR SOLUTION ORAL DAILY
COMMUNITY

## 2017-01-01 RX ORDER — DIPHENHYDRAMINE HYDROCHLORIDE 50 MG/ML
12.5 INJECTION INTRAMUSCULAR; INTRAVENOUS
Status: DISCONTINUED | OUTPATIENT
Start: 2017-01-01 | End: 2017-01-01 | Stop reason: HOSPADM

## 2017-01-01 RX ORDER — DEXTROSE MONOHYDRATE 50 MG/ML
250 INJECTION, SOLUTION INTRAVENOUS CONTINUOUS
Status: DISPENSED | OUTPATIENT
Start: 2017-01-01 | End: 2017-01-01

## 2017-01-01 RX ORDER — FUROSEMIDE 20 MG/1
20 TABLET ORAL 2 TIMES DAILY
COMMUNITY

## 2017-01-01 RX ORDER — IPRATROPIUM BROMIDE AND ALBUTEROL SULFATE 2.5; .5 MG/3ML; MG/3ML
3 SOLUTION RESPIRATORY (INHALATION) ONCE
Status: COMPLETED | OUTPATIENT
Start: 2017-01-01 | End: 2017-01-01

## 2017-01-01 RX ORDER — PROMETHAZINE HYDROCHLORIDE 12.5 MG/1
12.5 SUPPOSITORY RECTAL EVERY 6 HOURS PRN
Status: DISCONTINUED | OUTPATIENT
Start: 2017-01-01 | End: 2017-01-01 | Stop reason: HOSPADM

## 2017-01-01 RX ORDER — SODIUM PHOSPHATE, DIBASIC AND SODIUM PHOSPHATE, MONOBASIC 7; 19 G/133ML; G/133ML
ENEMA RECTAL ONCE AS NEEDED
COMMUNITY

## 2017-01-01 RX ORDER — AMLODIPINE BESYLATE 10 MG/1
10 TABLET ORAL DAILY
Status: DISCONTINUED | OUTPATIENT
Start: 2017-01-01 | End: 2017-01-01

## 2017-01-01 RX ORDER — ALPRAZOLAM 0.25 MG/1
0.25 TABLET ORAL DAILY
Status: DISCONTINUED | OUTPATIENT
Start: 2017-01-01 | End: 2017-01-01

## 2017-01-01 RX ORDER — HYDROCODONE BITARTRATE AND ACETAMINOPHEN 10; 325 MG/1; MG/1
1 TABLET ORAL EVERY 4 HOURS PRN
COMMUNITY

## 2017-01-01 RX ORDER — ALBUTEROL SULFATE 90 UG/1
2 AEROSOL, METERED RESPIRATORY (INHALATION) EVERY 4 HOURS PRN
Status: DISCONTINUED | OUTPATIENT
Start: 2017-01-01 | End: 2017-01-01 | Stop reason: HOSPADM

## 2017-01-01 RX ORDER — LABETALOL HYDROCHLORIDE 5 MG/ML
5 INJECTION, SOLUTION INTRAVENOUS
Status: DISCONTINUED | OUTPATIENT
Start: 2017-01-01 | End: 2017-01-01 | Stop reason: HOSPADM

## 2017-01-01 RX ORDER — HEPARIN SODIUM 5000 [USP'U]/ML
5000 INJECTION, SOLUTION INTRAVENOUS; SUBCUTANEOUS EVERY 8 HOURS SCHEDULED
Status: DISCONTINUED | OUTPATIENT
Start: 2017-01-01 | End: 2017-01-01 | Stop reason: HOSPADM

## 2017-01-01 RX ORDER — DOCUSATE SODIUM 50 MG/5 ML
50 LIQUID (ML) ORAL DAILY
COMMUNITY

## 2017-01-01 RX ORDER — NALOXONE HCL 0.4 MG/ML
0.4 VIAL (ML) INJECTION
Status: DISCONTINUED | OUTPATIENT
Start: 2017-01-01 | End: 2017-01-01

## 2017-01-01 RX ORDER — DESMOPRESSIN ACETATE 0.1 MG/1
100 TABLET ORAL ONCE
Status: DISCONTINUED | OUTPATIENT
Start: 2017-01-01 | End: 2017-01-01

## 2017-01-01 RX ORDER — PROPOFOL 10 MG/ML
VIAL (ML) INTRAVENOUS AS NEEDED
Status: DISCONTINUED | OUTPATIENT
Start: 2017-01-01 | End: 2017-01-01 | Stop reason: SURG

## 2017-01-01 RX ORDER — FUROSEMIDE 20 MG/1
20 TABLET ORAL 2 TIMES DAILY
Status: DISCONTINUED | OUTPATIENT
Start: 2017-01-01 | End: 2017-01-01

## 2017-01-01 RX ORDER — IPRATROPIUM BROMIDE AND ALBUTEROL SULFATE 2.5; .5 MG/3ML; MG/3ML
3 SOLUTION RESPIRATORY (INHALATION)
Status: DISCONTINUED | OUTPATIENT
Start: 2017-01-01 | End: 2017-01-01 | Stop reason: HOSPADM

## 2017-01-01 RX ORDER — LEVOFLOXACIN 500 MG/1
500 TABLET, FILM COATED ORAL DAILY
Qty: 10 TABLET | Refills: 0 | Status: SHIPPED | OUTPATIENT
Start: 2017-01-01 | End: 2017-01-01

## 2017-01-01 RX ORDER — FAMOTIDINE 40 MG/1
40 TABLET, FILM COATED ORAL DAILY
Status: DISCONTINUED | OUTPATIENT
Start: 2017-01-01 | End: 2017-01-01 | Stop reason: HOSPADM

## 2017-01-01 RX ORDER — FLUOXETINE HYDROCHLORIDE 40 MG/1
40 CAPSULE ORAL 2 TIMES DAILY
COMMUNITY

## 2017-01-01 RX ORDER — DEXTROSE MONOHYDRATE 50 MG/ML
250 INJECTION, SOLUTION INTRAVENOUS CONTINUOUS
Status: DISCONTINUED | OUTPATIENT
Start: 2017-01-01 | End: 2017-01-01

## 2017-01-01 RX ORDER — DEXTROSE MONOHYDRATE 50 MG/ML
75 INJECTION, SOLUTION INTRAVENOUS CONTINUOUS
Status: DISCONTINUED | OUTPATIENT
Start: 2017-01-01 | End: 2017-01-01

## 2017-01-01 RX ORDER — ONDANSETRON 2 MG/ML
INJECTION INTRAMUSCULAR; INTRAVENOUS AS NEEDED
Status: DISCONTINUED | OUTPATIENT
Start: 2017-01-01 | End: 2017-01-01 | Stop reason: SURG

## 2017-01-01 RX ADMIN — IPRATROPIUM BROMIDE AND ALBUTEROL SULFATE 3 ML: 2.5; .5 SOLUTION RESPIRATORY (INHALATION) at 20:22

## 2017-01-01 RX ADMIN — FLUOXETINE 40 MG: 20 CAPSULE ORAL at 09:08

## 2017-01-01 RX ADMIN — AZTREONAM 1 G: 1 INJECTION, POWDER, FOR SOLUTION INTRAMUSCULAR; INTRAVENOUS at 12:35

## 2017-01-01 RX ADMIN — AMLODIPINE BESYLATE 5 MG: 5 TABLET ORAL at 08:44

## 2017-01-01 RX ADMIN — IPRATROPIUM BROMIDE AND ALBUTEROL SULFATE 3 ML: 2.5; .5 SOLUTION RESPIRATORY (INHALATION) at 06:54

## 2017-01-01 RX ADMIN — AZTREONAM 1 G: 1 INJECTION, POWDER, FOR SOLUTION INTRAMUSCULAR; INTRAVENOUS at 04:06

## 2017-01-01 RX ADMIN — PROPOFOL 10 MG: 10 INJECTION, EMULSION INTRAVENOUS at 17:51

## 2017-01-01 RX ADMIN — FLUOXETINE 40 MG: 20 CAPSULE ORAL at 08:19

## 2017-01-01 RX ADMIN — AZTREONAM 1 G: 1 INJECTION, POWDER, FOR SOLUTION INTRAMUSCULAR; INTRAVENOUS at 03:07

## 2017-01-01 RX ADMIN — AZTREONAM 1 G: 1 INJECTION, POWDER, FOR SOLUTION INTRAMUSCULAR; INTRAVENOUS at 18:34

## 2017-01-01 RX ADMIN — IOPAMIDOL 95 ML: 612 INJECTION, SOLUTION INTRAVENOUS at 17:45

## 2017-01-01 RX ADMIN — FAMOTIDINE 40 MG: 40 TABLET ORAL at 08:57

## 2017-01-01 RX ADMIN — POLYETHYLENE GLYCOL 3350 17 G: 17 POWDER, FOR SOLUTION ORAL at 08:45

## 2017-01-01 RX ADMIN — AMLODIPINE BESYLATE 10 MG: 10 TABLET ORAL at 08:44

## 2017-01-01 RX ADMIN — AZTREONAM 1 G: 1 INJECTION, POWDER, FOR SOLUTION INTRAMUSCULAR; INTRAVENOUS at 10:42

## 2017-01-01 RX ADMIN — FLUOXETINE 40 MG: 20 CAPSULE ORAL at 17:37

## 2017-01-01 RX ADMIN — MORPHINE SULFATE 0.52 MG: 4 INJECTION, SOLUTION INTRAMUSCULAR; INTRAVENOUS at 05:17

## 2017-01-01 RX ADMIN — IPRATROPIUM BROMIDE AND ALBUTEROL SULFATE 3 ML: 2.5; .5 SOLUTION RESPIRATORY (INHALATION) at 10:54

## 2017-01-01 RX ADMIN — AMLODIPINE BESYLATE 5 MG: 5 TABLET ORAL at 08:18

## 2017-01-01 RX ADMIN — ALPRAZOLAM 0.5 MG: 0.25 TABLET ORAL at 21:24

## 2017-01-01 RX ADMIN — ONDANSETRON 4 MG: 2 INJECTION INTRAMUSCULAR; INTRAVENOUS at 03:34

## 2017-01-01 RX ADMIN — DEXTROSE MONOHYDRATE 100 ML/HR: 50 INJECTION, SOLUTION INTRAVENOUS at 15:42

## 2017-01-01 RX ADMIN — IPRATROPIUM BROMIDE AND ALBUTEROL SULFATE 3 ML: 2.5; .5 SOLUTION RESPIRATORY (INHALATION) at 07:36

## 2017-01-01 RX ADMIN — POLYETHYLENE GLYCOL 3350 17 G: 17 POWDER, FOR SOLUTION ORAL at 08:44

## 2017-01-01 RX ADMIN — AZTREONAM 1 G: 1 INJECTION, POWDER, FOR SOLUTION INTRAMUSCULAR; INTRAVENOUS at 20:46

## 2017-01-01 RX ADMIN — HYDROCODONE BITARTRATE AND ACETAMINOPHEN 1 TABLET: 10; 325 TABLET ORAL at 20:46

## 2017-01-01 RX ADMIN — LEVOFLOXACIN 500 MG: 5 INJECTION, SOLUTION INTRAVENOUS at 11:54

## 2017-01-01 RX ADMIN — ALPRAZOLAM 0.25 MG: 0.25 TABLET ORAL at 09:08

## 2017-01-01 RX ADMIN — AZTREONAM 1 G: 1 INJECTION, POWDER, FOR SOLUTION INTRAMUSCULAR; INTRAVENOUS at 20:06

## 2017-01-01 RX ADMIN — HYDROCODONE BITARTRATE AND ACETAMINOPHEN 1 TABLET: 10; 325 TABLET ORAL at 02:49

## 2017-01-01 RX ADMIN — IPRATROPIUM BROMIDE AND ALBUTEROL SULFATE 3 ML: 2.5; .5 SOLUTION RESPIRATORY (INHALATION) at 10:50

## 2017-01-01 RX ADMIN — HEPARIN SODIUM 5000 UNITS: 5000 INJECTION, SOLUTION INTRAVENOUS; SUBCUTANEOUS at 14:51

## 2017-01-01 RX ADMIN — IPRATROPIUM BROMIDE AND ALBUTEROL SULFATE 3 ML: 2.5; .5 SOLUTION RESPIRATORY (INHALATION) at 15:49

## 2017-01-01 RX ADMIN — NORTRIPTYLINE HYDROCHLORIDE 50 MG: 50 CAPSULE ORAL at 22:18

## 2017-01-01 RX ADMIN — AZTREONAM 1 G: 1 INJECTION, POWDER, FOR SOLUTION INTRAMUSCULAR; INTRAVENOUS at 21:24

## 2017-01-01 RX ADMIN — TAMSULOSIN HYDROCHLORIDE 0.4 MG: 0.4 CAPSULE ORAL at 08:44

## 2017-01-01 RX ADMIN — IPRATROPIUM BROMIDE AND ALBUTEROL SULFATE 3 ML: 2.5; .5 SOLUTION RESPIRATORY (INHALATION) at 19:06

## 2017-01-01 RX ADMIN — ALPRAZOLAM 0.5 MG: 0.25 TABLET ORAL at 09:00

## 2017-01-01 RX ADMIN — AMLODIPINE BESYLATE 10 MG: 10 TABLET ORAL at 07:28

## 2017-01-01 RX ADMIN — IPRATROPIUM BROMIDE AND ALBUTEROL SULFATE 3 ML: 2.5; .5 SOLUTION RESPIRATORY (INHALATION) at 11:11

## 2017-01-01 RX ADMIN — POLYETHYLENE GLYCOL 3350 17 G: 17 POWDER, FOR SOLUTION ORAL at 08:19

## 2017-01-01 RX ADMIN — HYDROCODONE BITARTRATE AND ACETAMINOPHEN 1 TABLET: 10; 325 TABLET ORAL at 04:48

## 2017-01-01 RX ADMIN — HYDROMORPHONE HYDROCHLORIDE 1 MG: 1 INJECTION, SOLUTION INTRAMUSCULAR; INTRAVENOUS; SUBCUTANEOUS at 04:23

## 2017-01-01 RX ADMIN — ALPRAZOLAM 0.25 MG: 0.25 TABLET ORAL at 08:48

## 2017-01-01 RX ADMIN — HEPARIN SODIUM 5000 UNITS: 5000 INJECTION, SOLUTION INTRAVENOUS; SUBCUTANEOUS at 05:33

## 2017-01-01 RX ADMIN — AZTREONAM 1 G: 1 INJECTION, POWDER, FOR SOLUTION INTRAMUSCULAR; INTRAVENOUS at 12:33

## 2017-01-01 RX ADMIN — HYDROCODONE BITARTRATE AND ACETAMINOPHEN 1 TABLET: 10; 325 TABLET ORAL at 09:08

## 2017-01-01 RX ADMIN — AZTREONAM 1 G: 1 INJECTION, POWDER, FOR SOLUTION INTRAMUSCULAR; INTRAVENOUS at 11:58

## 2017-01-01 RX ADMIN — HEPARIN SODIUM 5000 UNITS: 5000 INJECTION, SOLUTION INTRAVENOUS; SUBCUTANEOUS at 13:44

## 2017-01-01 RX ADMIN — MORPHINE SULFATE 4 MG: 4 INJECTION, SOLUTION INTRAMUSCULAR; INTRAVENOUS at 03:34

## 2017-01-01 RX ADMIN — HYDROCODONE BITARTRATE AND ACETAMINOPHEN 1 TABLET: 10; 325 TABLET ORAL at 19:48

## 2017-01-01 RX ADMIN — HEPARIN SODIUM 5000 UNITS: 5000 INJECTION, SOLUTION INTRAVENOUS; SUBCUTANEOUS at 15:26

## 2017-01-01 RX ADMIN — LABETALOL HYDROCHLORIDE 40 MG: 5 INJECTION INTRAVENOUS at 04:23

## 2017-01-01 RX ADMIN — HYDROCODONE BITARTRATE AND ACETAMINOPHEN 1 TABLET: 10; 325 TABLET ORAL at 06:43

## 2017-01-01 RX ADMIN — IPRATROPIUM BROMIDE AND ALBUTEROL SULFATE 3 ML: 2.5; .5 SOLUTION RESPIRATORY (INHALATION) at 17:17

## 2017-01-01 RX ADMIN — FLUOXETINE 40 MG: 20 CAPSULE ORAL at 08:43

## 2017-01-01 RX ADMIN — IPRATROPIUM BROMIDE AND ALBUTEROL SULFATE 3 ML: 2.5; .5 SOLUTION RESPIRATORY (INHALATION) at 07:14

## 2017-01-01 RX ADMIN — IPRATROPIUM BROMIDE AND ALBUTEROL SULFATE 3 ML: 2.5; .5 SOLUTION RESPIRATORY (INHALATION) at 19:26

## 2017-01-01 RX ADMIN — AZTREONAM 1 G: 1 INJECTION, POWDER, FOR SOLUTION INTRAMUSCULAR; INTRAVENOUS at 04:10

## 2017-01-01 RX ADMIN — CLONIDINE HYDROCHLORIDE 0.2 MG: 0.2 TABLET ORAL at 06:13

## 2017-01-01 RX ADMIN — FLUOXETINE 40 MG: 20 CAPSULE ORAL at 17:00

## 2017-01-01 RX ADMIN — HEPARIN SODIUM 5000 UNITS: 5000 INJECTION, SOLUTION INTRAVENOUS; SUBCUTANEOUS at 22:18

## 2017-01-01 RX ADMIN — NORTRIPTYLINE HYDROCHLORIDE 50 MG: 50 CAPSULE ORAL at 21:24

## 2017-01-01 RX ADMIN — VANCOMYCIN HYDROCHLORIDE 1500 MG: 500 INJECTION, POWDER, LYOPHILIZED, FOR SOLUTION INTRAVENOUS at 15:21

## 2017-01-01 RX ADMIN — IPRATROPIUM BROMIDE AND ALBUTEROL SULFATE 3 ML: 2.5; .5 SOLUTION RESPIRATORY (INHALATION) at 19:22

## 2017-01-01 RX ADMIN — LABETALOL HYDROCHLORIDE 40 MG: 5 INJECTION INTRAVENOUS at 03:47

## 2017-01-01 RX ADMIN — IPRATROPIUM BROMIDE AND ALBUTEROL SULFATE 3 ML: 2.5; .5 SOLUTION RESPIRATORY (INHALATION) at 11:14

## 2017-01-01 RX ADMIN — HEPARIN SODIUM 5000 UNITS: 5000 INJECTION, SOLUTION INTRAVENOUS; SUBCUTANEOUS at 21:42

## 2017-01-01 RX ADMIN — HYDROCODONE BITARTRATE AND ACETAMINOPHEN 1 TABLET: 10; 325 TABLET ORAL at 08:51

## 2017-01-01 RX ADMIN — HEPARIN SODIUM 5000 UNITS: 5000 INJECTION, SOLUTION INTRAVENOUS; SUBCUTANEOUS at 06:13

## 2017-01-01 RX ADMIN — FLUOXETINE 40 MG: 20 CAPSULE ORAL at 08:44

## 2017-01-01 RX ADMIN — MORPHINE SULFATE 0.52 MG: 4 INJECTION, SOLUTION INTRAMUSCULAR; INTRAVENOUS at 11:23

## 2017-01-01 RX ADMIN — IPRATROPIUM BROMIDE AND ALBUTEROL SULFATE 3 ML: 2.5; .5 SOLUTION RESPIRATORY (INHALATION) at 14:46

## 2017-01-01 RX ADMIN — LEVOFLOXACIN 500 MG: 5 INJECTION, SOLUTION INTRAVENOUS at 11:28

## 2017-01-01 RX ADMIN — IPRATROPIUM BROMIDE AND ALBUTEROL SULFATE 3 ML: 2.5; .5 SOLUTION RESPIRATORY (INHALATION) at 11:10

## 2017-01-01 RX ADMIN — AMLODIPINE BESYLATE 10 MG: 10 TABLET ORAL at 08:57

## 2017-01-01 RX ADMIN — DESMOPRESSIN ACETATE 100 MCG: 0.1 TABLET ORAL at 09:39

## 2017-01-01 RX ADMIN — HEPARIN SODIUM 5000 UNITS: 5000 INJECTION, SOLUTION INTRAVENOUS; SUBCUTANEOUS at 21:24

## 2017-01-01 RX ADMIN — AZTREONAM 1 G: 1 INJECTION, POWDER, FOR SOLUTION INTRAMUSCULAR; INTRAVENOUS at 11:05

## 2017-01-01 RX ADMIN — AZTREONAM 1 G: 1 INJECTION, POWDER, FOR SOLUTION INTRAMUSCULAR; INTRAVENOUS at 10:39

## 2017-01-01 RX ADMIN — IPRATROPIUM BROMIDE AND ALBUTEROL SULFATE 3 ML: 2.5; .5 SOLUTION RESPIRATORY (INHALATION) at 11:36

## 2017-01-01 RX ADMIN — IPRATROPIUM BROMIDE AND ALBUTEROL SULFATE 3 ML: 2.5; .5 SOLUTION RESPIRATORY (INHALATION) at 07:21

## 2017-01-01 RX ADMIN — POLYETHYLENE GLYCOL 3350 17 G: 17 POWDER, FOR SOLUTION ORAL at 09:08

## 2017-01-01 RX ADMIN — SODIUM CHLORIDE 1000 ML: 9 INJECTION, SOLUTION INTRAVENOUS at 10:55

## 2017-01-01 RX ADMIN — PROPOFOL 10 MG: 10 INJECTION, EMULSION INTRAVENOUS at 17:50

## 2017-01-01 RX ADMIN — AZTREONAM 1 G: 1 INJECTION, POWDER, FOR SOLUTION INTRAMUSCULAR; INTRAVENOUS at 02:36

## 2017-01-01 RX ADMIN — DEXTROSE MONOHYDRATE 250 ML/HR: 50 INJECTION, SOLUTION INTRAVENOUS at 07:28

## 2017-01-01 RX ADMIN — LEVOFLOXACIN 500 MG: 5 INJECTION, SOLUTION INTRAVENOUS at 11:05

## 2017-01-01 RX ADMIN — HYDROCODONE BITARTRATE AND ACETAMINOPHEN 1 TABLET: 10; 325 TABLET ORAL at 21:24

## 2017-01-01 RX ADMIN — AZTREONAM 1 G: 1 INJECTION, POWDER, FOR SOLUTION INTRAMUSCULAR; INTRAVENOUS at 03:31

## 2017-01-01 RX ADMIN — VANCOMYCIN HYDROCHLORIDE 1500 MG: 500 INJECTION, POWDER, LYOPHILIZED, FOR SOLUTION INTRAVENOUS at 16:53

## 2017-01-01 RX ADMIN — FAMOTIDINE 40 MG: 40 TABLET ORAL at 08:19

## 2017-01-01 RX ADMIN — IPRATROPIUM BROMIDE AND ALBUTEROL SULFATE 3 ML: 2.5; .5 SOLUTION RESPIRATORY (INHALATION) at 07:52

## 2017-01-01 RX ADMIN — Medication 10 ML: at 06:15

## 2017-01-01 RX ADMIN — ALPRAZOLAM 0.25 MG: 0.25 TABLET ORAL at 08:43

## 2017-01-01 RX ADMIN — FUROSEMIDE 20 MG: 20 TABLET ORAL at 20:07

## 2017-01-01 RX ADMIN — IPRATROPIUM BROMIDE AND ALBUTEROL SULFATE 3 ML: 2.5; .5 SOLUTION RESPIRATORY (INHALATION) at 15:24

## 2017-01-01 RX ADMIN — MORPHINE SULFATE 4 MG: 4 INJECTION, SOLUTION INTRAMUSCULAR; INTRAVENOUS at 03:55

## 2017-01-01 RX ADMIN — FLUOXETINE 40 MG: 20 CAPSULE ORAL at 08:57

## 2017-01-01 RX ADMIN — TAMSULOSIN HYDROCHLORIDE 0.4 MG: 0.4 CAPSULE ORAL at 08:19

## 2017-01-01 RX ADMIN — HEPARIN SODIUM 5000 UNITS: 5000 INJECTION, SOLUTION INTRAVENOUS; SUBCUTANEOUS at 05:59

## 2017-01-01 RX ADMIN — SODIUM CHLORIDE 75 ML/HR: 4.5 INJECTION, SOLUTION INTRAVENOUS at 18:47

## 2017-01-01 RX ADMIN — ALPRAZOLAM 0.5 MG: 0.25 TABLET ORAL at 21:42

## 2017-01-01 RX ADMIN — SODIUM CHLORIDE 125 ML/HR: 9 INJECTION, SOLUTION INTRAVENOUS at 11:54

## 2017-01-01 RX ADMIN — NORTRIPTYLINE HYDROCHLORIDE 50 MG: 50 CAPSULE ORAL at 20:07

## 2017-01-01 RX ADMIN — SODIUM CHLORIDE 75 ML/HR: 4.5 INJECTION, SOLUTION INTRAVENOUS at 07:20

## 2017-01-01 RX ADMIN — HEPARIN SODIUM 5000 UNITS: 5000 INJECTION, SOLUTION INTRAVENOUS; SUBCUTANEOUS at 21:31

## 2017-01-01 RX ADMIN — TAMSULOSIN HYDROCHLORIDE 0.4 MG: 0.4 CAPSULE ORAL at 09:08

## 2017-01-01 RX ADMIN — CLONIDINE 1 PATCH: 0.2 PATCH TRANSDERMAL at 20:06

## 2017-01-01 RX ADMIN — VANCOMYCIN HYDROCHLORIDE 1750 MG: 1 INJECTION, POWDER, LYOPHILIZED, FOR SOLUTION INTRAVENOUS at 16:30

## 2017-01-01 RX ADMIN — PROPOFOL 10 MG: 10 INJECTION, EMULSION INTRAVENOUS at 17:52

## 2017-01-01 RX ADMIN — FAMOTIDINE 40 MG: 40 TABLET ORAL at 09:08

## 2017-01-01 RX ADMIN — HEPARIN SODIUM 5000 UNITS: 5000 INJECTION, SOLUTION INTRAVENOUS; SUBCUTANEOUS at 15:17

## 2017-01-01 RX ADMIN — DEXTROSE MONOHYDRATE 125 ML/HR: 50 INJECTION, SOLUTION INTRAVENOUS at 00:45

## 2017-01-01 RX ADMIN — HEPARIN SODIUM 5000 UNITS: 5000 INJECTION, SOLUTION INTRAVENOUS; SUBCUTANEOUS at 06:08

## 2017-01-01 RX ADMIN — CEFTRIAXONE 1 G: 1 INJECTION, POWDER, FOR SOLUTION INTRAMUSCULAR; INTRAVENOUS at 05:09

## 2017-01-01 RX ADMIN — AMLODIPINE BESYLATE 5 MG: 5 TABLET ORAL at 09:08

## 2017-01-01 RX ADMIN — TAMSULOSIN HYDROCHLORIDE 0.4 MG: 0.4 CAPSULE ORAL at 08:57

## 2017-01-01 RX ADMIN — ALPRAZOLAM 0.5 MG: 0.25 TABLET ORAL at 21:32

## 2017-01-01 RX ADMIN — NORTRIPTYLINE HYDROCHLORIDE 50 MG: 50 CAPSULE ORAL at 21:42

## 2017-01-01 RX ADMIN — FAMOTIDINE 40 MG: 40 TABLET ORAL at 08:44

## 2017-01-01 RX ADMIN — MORPHINE SULFATE 1 MG: 4 INJECTION, SOLUTION INTRAMUSCULAR; INTRAVENOUS at 10:48

## 2017-01-01 RX ADMIN — SODIUM CHLORIDE 500 ML: 9 INJECTION, SOLUTION INTRAVENOUS at 03:33

## 2017-01-01 RX ADMIN — ONDANSETRON 4 MG: 2 INJECTION INTRAMUSCULAR; INTRAVENOUS at 17:51

## 2017-01-01 RX ADMIN — NORTRIPTYLINE HYDROCHLORIDE 50 MG: 50 CAPSULE ORAL at 21:32

## 2017-01-01 RX ADMIN — FLUOXETINE 40 MG: 20 CAPSULE ORAL at 17:42

## 2017-01-01 RX ADMIN — ALPRAZOLAM 0.25 MG: 0.25 TABLET ORAL at 08:17

## 2017-01-01 RX ADMIN — ALPRAZOLAM 0.25 MG: 0.25 TABLET ORAL at 08:57

## 2017-04-23 NOTE — ED PROVIDER NOTES
Subjective   HPI Comments: Patient arrives via EMS with complaint of hematuria and flank pain, mostly on the right, though pain in the low back as well, which is more chronic.  Patient notes this feels like the time when he had his last kidney stone.  Patient notes symptoms x 2 days, but states he could not get anyone at the NH to help him.  Patient is known to have significant hematuria from his chronic indwelling cath in the past.  No f/c, though EMS noted patient felt hot.  Currently afebrile.        History provided by:  Patient   used: No        Review of Systems   Constitutional: Negative.  Negative for appetite change, chills and fever.   HENT: Negative.  Negative for congestion.    Eyes: Negative.  Negative for photophobia and visual disturbance.   Respiratory: Negative.  Negative for cough, chest tightness and shortness of breath.    Cardiovascular: Negative.  Negative for chest pain and palpitations.   Gastrointestinal: Negative.  Negative for abdominal pain, constipation, diarrhea, nausea and vomiting.   Endocrine: Negative.    Genitourinary: Positive for dysuria and hematuria. Negative for decreased urine volume and flank pain.   Musculoskeletal: Positive for back pain. Negative for arthralgias, myalgias, neck pain and neck stiffness.   Skin: Negative.  Negative for pallor.   Neurological: Negative.  Negative for dizziness, syncope, weakness, light-headedness, numbness and headaches.   Psychiatric/Behavioral: Negative.  Negative for confusion and suicidal ideas. The patient is not nervous/anxious.        Past Medical History:   Diagnosis Date   • Agoraphobia with panic disorder    • Amputation of leg, right, traumatic    • Anemia    • Anxiety disorder    • Astigmatism    • Cataract    • Cellulitis of abdominal wall    • Cerebral atherosclerosis    • Chronic bronchitis    • Chronic ischemic heart disease, unspecified    • Chronic pain syndrome    • Chronic respiratory failure    •  Constipation    • Contracture of hand joint    • COPD (chronic obstructive pulmonary disease)    • Cystitis    • Demyelinating disease of central nervous system    • Disturbances of salivary secretion    • Dysphagia    • Essential (primary) hypertension    • Gastrostomy malfunction    • Gastrostomy status    • GERD (gastroesophageal reflux disease)    • Hemiplegia and hemiparesis following unspecified cerebrovascular disease affecting left non-dominant side    • Hydronephrosis    • Hyperlipidemia    • Hypertensive chronic kidney disease    • Insomnia    • Major depressive disorder, single episode    • Mild cognitive impairment    • Obesity    • Obstructive sleep apnea    • Other specified cardiac arrhythmias    • Peripheral vascular disease    • Presbyopia    • Suicidal ideation    • Tinea unguium    • Unspecified sequelae of unspecified cerebrovascular disease    • Urinary calculi    • Urinary retention    • UTI (urinary tract infection)    • Vitreous degeneration        Allergies   Allergen Reactions   • Penicillins        History reviewed. No pertinent surgical history.    History reviewed. No pertinent family history.    Social History     Social History   • Marital status:      Spouse name: N/A   • Number of children: N/A   • Years of education: N/A     Social History Main Topics   • Smoking status: None   • Smokeless tobacco: None   • Alcohol use None   • Drug use: None   • Sexual activity: Not Asked     Other Topics Concern   • None     Social History Narrative   • None           Objective   Physical Exam   Constitutional: He is oriented to person, place, and time. He appears well-developed and well-nourished. No distress.   HENT:   Head: Normocephalic and atraumatic.   Eyes: Conjunctivae and EOM are normal.   Neck: Normal range of motion. Neck supple. No JVD present.   Cardiovascular: Normal rate, regular rhythm, normal heart sounds and intact distal pulses.  Exam reveals no gallop and no friction  rub.    No murmur heard.  Pulmonary/Chest: Effort normal. No respiratory distress. He has no wheezes. He has no rales. He exhibits no tenderness.   Abdominal: Soft. Bowel sounds are normal. He exhibits no distension and no mass. There is no tenderness. There is no rebound and no guarding.   Cath site appears c/d/i   Musculoskeletal: He exhibits edema (2+ LLE edema).   No significant CVA tenderness.  RLE AKA   Neurological: He is alert and oriented to person, place, and time.   Skin: Skin is warm and dry.   Psychiatric: He has a normal mood and affect. His behavior is normal. Judgment and thought content normal.   Nursing note and vitals reviewed.      Procedures         ED Course  ED Course      Labs Reviewed   COMPREHENSIVE METABOLIC PANEL - Abnormal; Notable for the following:        Result Value    Glucose 144 (*)     BUN 34 (*)     Alkaline Phosphatase 195 (*)     Globulin 4.4 (*)     A/G Ratio 1.0 (*)     BUN/Creatinine Ratio 33.7 (*)     All other components within normal limits    Narrative:     The MDRD GFR formula is only valid for adults with stable renal function between ages 18 and 70.   CBC WITH AUTO DIFFERENTIAL - Abnormal; Notable for the following:     WBC 12.33 (*)     RDW 16.3 (*)     RDW-SD 48.8 (*)     Neutrophil % 81.6 (*)     Lymphocyte % 8.7 (*)     Neutrophils, Absolute 10.06 (*)     Monocytes, Absolute 1.14 (*)     All other components within normal limits   RAINBOW DRAW    Narrative:     The following orders were created for panel order [place] Draw.  Procedure                               Abnormality         Status                     ---------                               -----------         ------                     Light Blue Top[[id number]]                                    Final result               Green Top (Gel)[[id number]]                                   Final result               Lavender Top[[id number]]                                      Final result               Gold Top -  SST[[id number]]                                    Final result                 Please view results for these tests on the individual orders.   SCAN SLIDE   LIGHT BLUE TOP   GREEN TOP   LAVENDER TOP   GOLD TOP - SST   CBC AND DIFFERENTIAL    Narrative:     The following orders were created for panel order CBC & Differential.  Procedure                               Abnormality         Status                     ---------                               -----------         ------                     Scan Slide[[id number]]                                        In process                 CBC Auto Differential[[id number]]         Abnormal            Final result                 Please view results for these tests on the individual orders.       CT Abdomen Pelvis Without Contrast   Final Result   1. Mild right hydronephrosis, hydroureter and nephrolithiasis. No   obstructing calculus   2. Moderate to severe left hydronephrosis and hydroureter. No   obstructing calculus   3. Diverticulosis   4. Spinal stenosis at multiple levels      Electronically signed by:  Donn Sam MD  4/23/2017 4:46 AM   CDT Workstation: [id number]        Bilateral hydronephrosis with hematuria with indwelling cath.  Will start abx, flomax, and outpatient followup for further management with primary.  No sepsis.              MDM    Final diagnoses:   Urinary tract infection associated with indwelling urethral catheter, initial encounter   Hematuria   Poorly-controlled hypertension   Hydronephrosis, bilateral            Chance Cowart MD  04/23/17 0653

## 2017-04-23 NOTE — DISCHARGE INSTRUCTIONS
Followup with Dr. Rodriguez for further management.  Continue current pain control.  Start levaquin for hematuria.

## 2017-08-13 PROBLEM — J18.9 PNEUMONIA OF BOTH LUNGS DUE TO INFECTIOUS ORGANISM: Status: ACTIVE | Noted: 2017-01-01

## 2017-08-13 NOTE — NURSING NOTE
Dr. Laurent spoke with AMAURY Anthony over the phone and explained the plan for anesthesia. All questions were answered, Risk and Benefits explained. SUSY Tyson RN verified daughters understanding and that consent was given to proceed. Dr. Laurent and SUSY Tyson RN signed off on Anesthesia Consent.

## 2017-08-13 NOTE — H&P
[facility] Medicine Admission      Date of Admission: 8/13/2017      Primary Care Physician: Mingo Rodriguez MD      Chief Complaint:   Leg swelling    HPI:    77 yr old male presenting with complaint of left lower extremity swelling and pain.  History is limited as he has some confusion.  He also has some trouble breathing and xray showed pneumonia.  He also has acute renal failure and sodium level of 120.      Past Medical History:  has a past medical history of Agoraphobia with panic disorder; Amputation of leg, right, traumatic; Anemia; Anxiety disorder; Astigmatism; Cataract; Cellulitis of abdominal wall; Cerebral atherosclerosis; Chronic bronchitis; Chronic ischemic heart disease, unspecified; Chronic pain syndrome; Chronic respiratory failure; Constipation; Contracture of hand joint; COPD (chronic obstructive pulmonary disease); Cystitis; Demyelinating disease of central nervous system; Disturbances of salivary secretion; Dysphagia; Essential (primary) hypertension; Gastrostomy malfunction; Gastrostomy status; GERD (gastroesophageal reflux disease); Hemiplegia and hemiparesis following unspecified cerebrovascular disease affecting left non-dominant side; Hydronephrosis; Hyperlipidemia; Hypertensive chronic kidney disease; Insomnia; Major depressive disorder, single episode; Mild cognitive impairment; Obesity; Obstructive sleep apnea; Other specified cardiac arrhythmias; Peripheral vascular disease; Presbyopia; Suicidal ideation; Tinea unguium; Unspecified sequelae of unspecified cerebrovascular disease; Urinary calculi; Urinary retention; UTI (urinary tract infection); and Vitreous degeneration.    Past Surgical History:  has no past surgical history on file.   Left knee replacement    Family History: family history is not on file. unable to obtain as patient is confused    Social History:  reports that he does not drink alcohol or use illicit drugs.    Allergies:    Allergies   Allergen Reactions   • Penicillins        Medications: Scheduled Meds:  aztreonam 1 g Intravenous Q8H   And      vancomycin 15 mg/kg Intravenous Once   levoFLOXacin 500 mg Intravenous Q48H   sodium chloride 1,000 mL Intravenous Once     Continuous Infusions:  Pharmacy to dose vancomycin     sodium chloride 125 mL/hr Last Rate: 125 mL/hr (08/13/17 1154)     PRN Meds:.aztreonam **AND** vancomycin **AND** Pharmacy to dose vancomycin     Home medications:    Hydrocodone  Promethazine  tamsulosin  Clonidine  prozac  norvasc  Xanax  Albuterol  Fentanyl    Current Facility-Administered Medications:   •  albuterol (PROVENTIL HFA;VENTOLIN HFA) inhaler 2 puff, 2 puff, Inhalation, Q4H PRN, Jatin Boone MD  •  albuterol (PROVENTIL) nebulizer solution 0.5% 2.5 mg/0.5mL, 2.5 mg, Nebulization, Q6H PRN, Jatin Boone MD  •  ALPRAZolam (XANAX) tablet 0.25 mg, 0.25 mg, Oral, Daily, Jatin Boone MD  •  ALPRAZolam (XANAX) tablet 0.5 mg, 0.5 mg, Oral, Daily, Jatin Boone MD  •  amLODIPine (NORVASC) tablet 10 mg, 10 mg, Oral, Daily, Jatin Boone MD  •  aztreonam (AZACTAM) 1 g/100 mL 0.9% NS IVPB (mbp), 1 g, Intravenous, Q8H, 1 g at 08/13/17 1235 **AND** [COMPLETED] vancomycin (VANCOCIN) 1,750 mg in sodium chloride 0.9 % 500 mL IVPB, 15 mg/kg, Intravenous, Once, 1,750 mg at 08/13/17 1630 **AND** [DISCONTINUED] Pharmacy to dose vancomycin, , Does not apply, Continuous PRN, Omari Rivero MD  •  CloNIDine (CATAPRES-TTS) 0.2 MG/24HR patch 1 patch, 1 patch, Transdermal, Weekly, Jatin Boone MD  •  famotidine (PEPCID) tablet 40 mg, 40 mg, Oral, Daily, Jatin Boone MD  •  FLUoxetine (PROzac) capsule 40 mg, 40 mg, Oral, BID, Jatin Boone MD  •  HYDROcodone-acetaminophen (NORCO)  MG per tablet 1 tablet, 1 tablet, Oral, Q4H PRN, Jatin Boone MD  •  [MAR Hold] ipratropium-albuterol (DUO-NEB) nebulizer solution 3 mL, 3 mL, Nebulization, 4x Daily - RT, Jatin Boone MD, 3 mL at  08/13/17 1524  •  levoFLOXacin (LEVAQUIN) 500 mg/100 mL D5W (premix) (LEVAQUIN) 500 mg, 500 mg, Intravenous, Q48H, Omari Rivero MD, 500 mg at 08/13/17 1154  •  [MAR Hold] Morphine sulfate (PF) injection 1 mg, 1 mg, Intravenous, Q4H PRN **AND** [MAR Hold] naloxone (NARCAN) injection 0.4 mg, 0.4 mg, Intravenous, Q5 Min PRN, Jatin Boone MD  •  nortriptyline (PAMELOR) capsule 50 mg, 50 mg, Oral, Nightly, Jatin Boone MD  •  Pharmacy to dose vancomycin, , Does not apply, Continuous PRN, Jatin Boone MD  •  polyethylene glycol (MIRALAX) powder 17 g, 17 g, Oral, Daily, Jatin Boone MD  •  [MAR Hold] promethazine (PHENERGAN) tablet 12.5 mg, 12.5 mg, Oral, Q6H PRN **OR** [MAR Hold] promethazine (PHENERGAN) injection 12.5 mg, 12.5 mg, Intramuscular, Q6H PRN **OR** [MAR Hold] promethazine (PHENERGAN) suppository 12.5 mg, 12.5 mg, Rectal, Q6H PRN, Jatin Boone MD  •  sodium chloride 0.9 % bolus 1,000 mL, 1,000 mL, Intravenous, Once, Omari Rivero MD  •  [MAR Hold] sodium chloride 0.9 % flush 1-10 mL, 1-10 mL, Intravenous, PRN, Jatin Boone MD  •  sodium chloride 0.9 % infusion, 75 mL/hr, Intravenous, Continuous, Malika Haile MD, Stopped at 08/13/17 1559  •  tamsulosin (FLOMAX) 24 hr capsule 0.4 mg, 0.4 mg, Oral, Daily, Jatin Boone MD    Facility-Administered Medications Ordered in Other Encounters:   •  ondansetron (ZOFRAN) injection, , Intravenous, PRN, Munir Laurent MD, 4 mg at 08/13/17 1751  •  Propofol (DIPRIVAN) injection, , Intravenous, PRN, Munir Laurent MD, 10 mg at 08/13/17 1801  No medication comments found.  Current outpatient and discharge medications have been reconciled for the patient.  Jatin Boone MD  Review of Systems:  Review of Systems   Constitutional: Negative for activity change.   Respiratory: Positive for shortness of breath.    Cardiovascular: Negative for chest pain.   Musculoskeletal: Positive for joint swelling.      Otherwise complete ROS is  negative except as mentioned above.      Physical Exam:   Temp:  [98.8 °F (37.1 °C)-99.7 °F (37.6 °C)] 98.8 °F (37.1 °C)  Heart Rate:  [104-108] 108  Resp:  [18-20] 18  BP: (155-164)/(79-82) 164/82  Physical Exam   Constitutional: He appears well-developed and well-nourished. No distress.   HENT:   Head: Normocephalic and atraumatic.   Cardiovascular: Normal rate.    Pulmonary/Chest: Effort normal. No respiratory distress. He has no wheezes.   Abdominal: Soft. He exhibits no distension. There is no tenderness.   Musculoskeletal: Normal range of motion. He exhibits edema and tenderness.   Neurological: He is alert.   Oriented x 2   Skin: Skin is warm and dry. He is not diaphoretic. There is erythema.   Left lower extremity erythema   Psychiatric: He has a normal mood and affect. His behavior is normal.   Vitals reviewed.        Results Reviewed:  I have personally reviewed current lab, radiology, and data and agree with results.  Lab Results (last 24 hours)     Procedure Component Value Units Date/Time    CBC & Differential [[id number]] Collected:  08/13/17 1007    Specimen:  Blood Updated:  08/13/17 1031    Narrative:       The following orders were created for panel order CBC & Differential.  Procedure                               Abnormality         Status                     ---------                               -----------         ------                     CBC Auto Differential[[id number]]        Abnormal            Final result                 Please view results for these tests on the individual orders.    CBC Auto Differential [[id number]]  (Abnormal) Collected:  08/13/17 1007    Specimen:  Blood Updated:  08/13/17 1031     WBC 10.47 (H) 10*3/mm3      RBC 4.57 10*6/mm3      Hemoglobin 11.9 (L) g/dL      Hematocrit 36.4 (L) %      MCV 79.6 (L) fL      MCH 26.0 (L) pg      MCHC 32.7 g/dL      RDW 17.2 (H) %      RDW-SD 50.0 (H) fl      MPV 11.1 fL      Platelets 152 10*3/mm3      Neutrophil % 78.9 %       Lymphocyte % 8.4 (L) %      Monocyte % 9.4 %      Eosinophil % 2.9 %      Basophil % 0.2 %      Immature Grans % 0.2 %      Neutrophils, Absolute 8.27 10*3/mm3      Lymphocytes, Absolute 0.88 10*3/mm3      Monocytes, Absolute 0.98 (H) 10*3/mm3      Eosinophils, Absolute 0.30 10*3/mm3      Basophils, Absolute 0.02 10*3/mm3      Immature Grans, Absolute 0.02 10*3/mm3      nRBC 0.0 /100 WBC     Lactic Acid, Plasma [[id number]]  (Normal) Collected:  08/13/17 1007    Specimen:  Blood Updated:  08/13/17 1042     Lactate 0.8 mmol/L     Lipase [[id number]]  (Normal) Collected:  08/13/17 1007    Specimen:  Blood Updated:  08/13/17 1043     Lipase 49 U/L     CK [[id number]]  (Abnormal) Collected:  08/13/17 1007    Specimen:  Blood Updated:  08/13/17 1043     Creatine Kinase 25 (L) U/L     Comprehensive Metabolic Panel [[id number]]  (Abnormal) Collected:  08/13/17 1007    Specimen:  Blood Updated:  08/13/17 1047     Glucose 116 (H) mg/dL      BUN 70 (H) mg/dL      Creatinine 3.79 (H) mg/dL      Sodium 120 (C) mmol/L      Potassium 4.8 mmol/L      Chloride 84 (L) mmol/L      CO2 23.0 mmol/L      Calcium 8.7 mg/dL      Total Protein 7.5 g/dL      Albumin 3.40 g/dL      ALT (SGPT) 41 U/L      AST (SGOT) 27 U/L      Alkaline Phosphatase 251 (H) U/L      Total Bilirubin 0.8 mg/dL      eGFR Non African Amer 15 (L) mL/min/1.73      Globulin 4.1 (H) gm/dL      A/G Ratio 0.8 (L) g/dL      BUN/Creatinine Ratio 18.5     Anion Gap 13.0 mmol/L     Narrative:       The MDRD GFR formula is only valid for adults with stable renal function between ages 18 and 70.    CK-MB [[id number]]  (Normal) Collected:  08/13/17 1007    Specimen:  Blood Updated:  08/13/17 1055     CKMB 0.84 ng/mL     Troponin [[id number]]  (Normal) Collected:  08/13/17 1007    Specimen:  Blood Updated:  08/13/17 1055     Troponin I 0.027 ng/mL     Blood Culture [[id number]] Collected:  08/13/17 1055    Specimen:  Blood from Arm, Left Updated:  08/13/17 1103    Extra Tubes  [[id number]] Collected:  08/13/17 1055    Specimen:  Blood from Blood, Venous Line Updated:  08/13/17 1109    Narrative:       The following orders were created for panel order Extra Tubes.  Procedure                               Abnormality         Status                     ---------                               -----------         ------                     Light Blue Top[[id number]]                                                              Lavender Top[[id number]]                                     In process                 Gold Top - SST[[id number]]                                   In process                 Green Top (Gel)[[id number]]                                  In process                   Please view results for these tests on the individual orders.    Lavender Top [[id number]] Collected:  08/13/17 1055    Specimen:  Blood Updated:  08/13/17 1109    Gold Top - SST [[id number]] Collected:  08/13/17 1055    Specimen:  Blood Updated:  08/13/17 1109    Green Top (Gel) [[id number]] Collected:  08/13/17 1055    Specimen:  Blood Updated:  08/13/17 1109    Protime-INR [[id number]]  (Normal) Collected:  08/13/17 1055    Specimen:  Blood Updated:  08/13/17 1122     Protime 14.0 Seconds      INR 1.09    Narrative:       Therapeutic range for most indications is 2.0-3.0 INR,  or 2.5-3.5 for mechanical heart valves.    aPTT [[id number]]  (Abnormal) Collected:  08/13/17 1055    Specimen:  Blood Updated:  08/13/17 1122     PTT 42.1 (H) seconds     Narrative:       The recommended Heparin therapeutic range is 68-97 seconds.        Imaging Results (last 24 hours)     Procedure Component Value Units Date/Time    XR Chest 1 View [[id number]] Collected:  08/13/17 0947     Updated:  08/13/17 1011    Narrative:         PROCEDURE: Single chest view AP    REASON FOR EXAM:ams    FINDINGS: Comparison exam dated October 30, 2016. Cardiac and  pulmonary vasculature are normal. Left lung base small  retrocardiac patchy opacity. Left  upper lobe lingula small linear  opacity. Lungs otherwise clear. Pleural spaces are normal. No  acute osseous abnormality.      Impression:       1.  Left lung base small retrocardiac patchy opacities suspicious  for early pneumonia versus subsegmental atelectasis.  2.  Left upper lobe lingula small focus of discoid atelectasis.    Electronically signed by:  Star Jimenez MD  8/13/2017 10:10 AM CDT  Workstation: [id number]    US Venous Doppler Lower Extremity Left (duplex) [[id number]] Collected:  08/13/17 1010     Updated:  08/13/17 1049    Narrative:       Procedure: Left lower extremity venous ultrasound      CLINICAL INDICATION: swelling/redness      COMPARISON: None    FINDINGS:    The common femoral,  femoral,  popliteal and calf veins of the  left  lower extremity are well identified and compress normally.   Doppler signals are heard either spontaneously or on distal  compression.  No evidence of intraluminal thrombus was noted.         Impression:       CONCLUSION:  No evidence of deep venous thrombosis in the common  femoral, femoral, popliteal and calf veins of the left  lower  extremity.    Electronically signed by:  Star Jimenez MD  8/13/2017 10:48 AM CDT  Workstation: [id number]          Left lower extremity cellulitis - vancomycin, blood cultures  Pneumonia - community acquired -  Left lung base - levaquin  Acute on chronic renal failure - nephrology consulted  Hyponatremia - IV fluids, nephrology consulted   Anxiety - xanax PRN            I discussed the patients findings and my recommendations with: patient    Jatin Boone MD  08/13/17  11:59 AM

## 2017-08-13 NOTE — ANESTHESIA POSTPROCEDURE EVALUATION
Patient: Rex Anthony    Procedure Summary     Date Anesthesia Start Anesthesia Stop Room / Location    08/13/17 1744   [facility] / [facility]       Procedure Diagnosis Surgeon Provider    CYSTOSCOPY WITH CATHETER PLACEMENT (N/A Urethra) Retention of urine, unspecified  (Retention of urine, unspecified [R33.9]) MD Munir Erickson MD          Anesthesia Type: general  Last vitals  BP        Temp        Pulse       Resp        SpO2          Post Anesthesia Care and Evaluation    Patient location during evaluation: bedside  Patient participation: complete - patient participated  Level of consciousness: awake  Pain score: 1  Pain management: adequate  Airway patency: patent  Anesthetic complications: No anesthetic complications  PONV Status: none  Cardiovascular status: acceptable  Respiratory status: airway suctioned  Hydration status: acceptable  Post Neuraxial Block status: Motor and sensory function returned to baseline

## 2017-08-13 NOTE — ED PROVIDER NOTES
Subjective   HPI Comments: 77 years old nursing home resident with history of anxiety/depression/hypertension, hyperlipidemia sent in the ER with complains of increasing shortness of breath and left leg swelling.    Patient is a 77 y.o. male presenting with shortness of breath.   History provided by:  Patient  Shortness of Breath   Severity:  Moderate  Onset quality:  Gradual  Duration:  1 day  Timing:  Constant  Progression:  Worsening  Chronicity:  New  Relieved by:  Nothing  Worsened by:  Nothing  Ineffective treatments:  Inhaler  Associated symptoms: cough, diaphoresis, sputum production and wheezing    Associated symptoms: no abdominal pain, no chest pain, no fever, no headaches, no hemoptysis, no rash, no swollen glands and no vomiting    Cough:     Cough characteristics:  Productive    Sputum characteristics:  Unable to specify    Severity:  Moderate    Onset quality:  Gradual    Duration:  2 days    Timing:  Intermittent    Progression:  Worsening    Chronicity:  New  Wheezing:     Severity:  Moderate    Onset quality:  Gradual    Duration:  12 days    Timing:  Constant    Progression:  Worsening    Chronicity:  New  Risk factors: obesity and prolonged immobilization        Review of Systems   Constitutional: Positive for diaphoresis. Negative for fever.   HENT: Negative for congestion.    Eyes: Negative for pain.   Respiratory: Positive for cough, sputum production, shortness of breath and wheezing. Negative for hemoptysis.    Cardiovascular: Negative for chest pain.   Gastrointestinal: Negative for abdominal pain and vomiting.   Genitourinary: Negative for flank pain.   Musculoskeletal: Positive for gait problem.   Skin: Negative for rash.   Neurological: Negative for headaches.       Past Medical History:   Diagnosis Date   • Agoraphobia with panic disorder    • Amputation of leg, right, traumatic    • Anemia    • Anxiety disorder    • Astigmatism    • Cataract    • Cellulitis of abdominal wall    •  Cerebral atherosclerosis    • Chronic bronchitis    • Chronic ischemic heart disease, unspecified    • Chronic pain syndrome    • Chronic respiratory failure    • Constipation    • Contracture of hand joint    • COPD (chronic obstructive pulmonary disease)    • Cystitis    • Demyelinating disease of central nervous system    • Disturbances of salivary secretion    • Dysphagia    • Essential (primary) hypertension    • Gastrostomy malfunction    • Gastrostomy status    • GERD (gastroesophageal reflux disease)    • Hemiplegia and hemiparesis following unspecified cerebrovascular disease affecting left non-dominant side    • Hydronephrosis    • Hyperlipidemia    • Hypertensive chronic kidney disease    • Insomnia    • Major depressive disorder, single episode    • Mild cognitive impairment    • Obesity    • Obstructive sleep apnea    • Other specified cardiac arrhythmias    • Peripheral vascular disease    • Presbyopia    • Suicidal ideation    • Tinea unguium    • Unspecified sequelae of unspecified cerebrovascular disease    • Urinary calculi    • Urinary retention    • UTI (urinary tract infection)    • Vitreous degeneration        Allergies   Allergen Reactions   • Penicillins        History reviewed. No pertinent surgical history.    History reviewed. No pertinent family history.    Social History     Social History   • Marital status:      Spouse name: N/A   • Number of children: N/A   • Years of education: N/A     Social History Main Topics   • Smoking status: Unknown If Ever Smoked   • Smokeless tobacco: None   • Alcohol use No   • Drug use: No   • Sexual activity: Not Asked     Other Topics Concern   • None     Social History Narrative   • None           Objective   Physical Exam   Constitutional: He is oriented to person, place, and time. He appears well-developed and well-nourished.   HENT:   Head: Normocephalic and atraumatic.   Nose: Nose normal.   Eyes: Conjunctivae are normal. Pupils are equal,  round, and reactive to light.   Neck: Normal range of motion. Neck supple. No tracheal deviation present. No thyromegaly present.   Cardiovascular: Regular rhythm and normal heart sounds.  Tachycardia present.    Pulmonary/Chest: He is in respiratory distress. He has wheezes. He has rales.   Abdominal: Soft. Bowel sounds are normal. There is no tenderness.   Musculoskeletal: He exhibits tenderness and deformity.   Right leg above-knee amputation.  Left leg swollen, redness/erythema from mid thigh up to the ankle.  Warm and tender to touch.     Neurological: He is alert and oriented to person, place, and time. He exhibits normal muscle tone.   Skin: Rash noted. There is erythema.   Psychiatric: His affect is blunt.   Nursing note and vitals reviewed.      ECG 12 Lead    Date/Time: 8/13/2017 9:43 AM  Performed by: DONNIE REES  Authorized by: DONNIE REES   Interpreted by physician  Rhythm: sinus tachycardia  Rate: tachycardic  BPM: 103  QRS axis: left  Conduction: right bundle branch block  T depression: V1, V2 and V3  T flattening: V4 and V5  Other findings: PRWP  Clinical impression: abnormal ECG               ED Course  ED Course   Comment By Time   Workup showed patient has acute renal failure with hyponatremia and is started on IV fluid.  He also has cellulitis and pneumonia and is on broad-spectrum antibiotics.  Rule out DVT.  I have D/w Dr.IQBAL Dr. Daly  nephrology and patient is being admitted. Donnie Rees MD 08/13 1200                Labs Reviewed   COMPREHENSIVE METABOLIC PANEL - Abnormal; Notable for the following:        Result Value    Glucose 116 (*)     BUN 70 (*)     Creatinine 3.79 (*)     Sodium 120 (*)     Chloride 84 (*)     Alkaline Phosphatase 251 (*)     eGFR Non African Amer 15 (*)     Globulin 4.1 (*)     A/G Ratio 0.8 (*)     All other components within normal limits    Narrative:     The MDRD GFR formula is only valid for adults with stable renal function between ages 18 and 70.   APTT -  Abnormal; Notable for the following:     PTT 42.1 (*)     All other components within normal limits    Narrative:     The recommended Heparin therapeutic range is 68-97 seconds.   CK - Abnormal; Notable for the following:     Creatine Kinase 25 (*)     All other components within normal limits   BLOOD GAS, ARTERIAL - Abnormal; Notable for the following:     pO2, Arterial 60.6 (*)     Hemoglobin, Blood Gas 12.4 (*)     Hematocrit, Blood Gas 36.0 (*)     Sodium, Arterial 122.7 (*)     All other components within normal limits   CBC WITH AUTO DIFFERENTIAL - Abnormal; Notable for the following:     WBC 10.47 (*)     Hemoglobin 11.9 (*)     Hematocrit 36.4 (*)     MCV 79.6 (*)     MCH 26.0 (*)     RDW 17.2 (*)     RDW-SD 50.0 (*)     Lymphocyte % 8.4 (*)     Monocytes, Absolute 0.98 (*)     All other components within normal limits   BASIC METABOLIC PANEL - Abnormal; Notable for the following:     Glucose 136 (*)     BUN 72 (*)     Creatinine 3.78 (*)     Sodium 118 (*)     Chloride 89 (*)     CO2 19.0 (*)     eGFR Non  Amer 16 (*)     All other components within normal limits    Narrative:     The MDRD GFR formula is only valid for adults with stable renal function between ages 18 and 70.   URINALYSIS W/ CULTURE IF INDICATED - Abnormal; Notable for the following:     Color, UA Red (*)     Appearance, UA Turbid (*)     Ketones, UA 15 mg/dL (1+) (*)     Bilirubin, UA Moderate (2+) (*)     Blood, UA Large (3+) (*)     Protein,  mg/dL (2+) (*)     Leuk Esterase, UA Large (3+) (*)     All other components within normal limits   CBC WITH AUTO DIFFERENTIAL - Abnormal; Notable for the following:     WBC 10.70 (*)     Hemoglobin 12.5 (*)     Hematocrit 38.0 (*)     MCV 79.7 (*)     MCH 26.2 (*)     RDW 17.3 (*)     RDW-SD 50.2 (*)     Neutrophil % 81.0 (*)     Lymphocyte % 6.6 (*)     Neutrophils, Absolute 8.67 (*)     Monocytes, Absolute 1.08 (*)     All other components within normal limits   URINALYSIS,  MICROSCOPIC ONLY - Abnormal; Notable for the following:     RBC, UA Too Numerous to Count (*)     WBC, UA Too Numerous to Count (*)     Bacteria, UA 3+ (*)     All other components within normal limits   BASIC METABOLIC PANEL - Abnormal; Notable for the following:     BUN 73 (*)     Creatinine 3.52 (*)     Sodium 125 (*)     Chloride 92 (*)     eGFR Non  Amer 17 (*)     All other components within normal limits    Narrative:     The MDRD GFR formula is only valid for adults with stable renal function between ages 18 and 70.   BASIC METABOLIC PANEL - Abnormal; Notable for the following:     BUN 68 (*)     Creatinine 3.75 (*)     Sodium 132 (*)     eGFR Non  Amer 16 (*)     All other components within normal limits    Narrative:     The MDRD GFR formula is only valid for adults with stable renal function between ages 18 and 70.   CBC WITH AUTO DIFFERENTIAL - Abnormal; Notable for the following:     WBC 11.93 (*)     Hemoglobin 12.0 (*)     Hematocrit 36.8 (*)     MCV 79.8 (*)     MCH 26.0 (*)     RDW 17.3 (*)     RDW-SD 50.3 (*)     Neutrophil % 88.9 (*)     Lymphocyte % 4.9 (*)     Neutrophils, Absolute 10.60 (*)     Lymphocytes, Absolute 0.59 (*)     Immature Grans, Absolute 0.05 (*)     All other components within normal limits   BASIC METABOLIC PANEL - Abnormal; Notable for the following:     Glucose 112 (*)     BUN 71 (*)     Creatinine 3.57 (*)     Sodium 131 (*)     eGFR Non  Amer 17 (*)     All other components within normal limits    Narrative:     The MDRD GFR formula is only valid for adults with stable renal function between ages 18 and 70.   BASIC METABOLIC PANEL - Abnormal; Notable for the following:     Glucose 166 (*)     BUN 71 (*)     Creatinine 3.42 (*)     Sodium 132 (*)     eGFR Non  Amer 17 (*)     All other components within normal limits    Narrative:     The MDRD GFR formula is only valid for adults with stable renal function between ages 18 and 70.   C-REACTIVE  PROTEIN - Abnormal; Notable for the following:     C-Reactive Protein 32.90 (*)     All other components within normal limits   BASIC METABOLIC PANEL - Abnormal; Notable for the following:     Glucose 170 (*)     BUN 69 (*)     Creatinine 3.19 (*)     Sodium 131 (*)     eGFR Non  Amer 19 (*)     All other components within normal limits    Narrative:     The MDRD GFR formula is only valid for adults with stable renal function between ages 18 and 70.   C-REACTIVE PROTEIN - Abnormal; Notable for the following:     C-Reactive Protein 32.00 (*)     All other components within normal limits   BLOOD CULTURE - Normal   BLOOD CULTURE - Normal   URINE CULTURE - Normal   PROTIME-INR - Normal    Narrative:     Therapeutic range for most indications is 2.0-3.0 INR,  or 2.5-3.5 for mechanical heart valves.   LIPASE - Normal   CK MB - Normal   TROPONIN (IN-HOUSE) - Normal   LACTIC ACID, PLASMA - Normal   SODIUM, URINE, RANDOM - Normal   OSMOLALITY, URINE - Normal   OSMOLALITY, SERUM - Normal   CRE SCREEN BY PCR   CREATININE, URINE, RANDOM   SCAN SLIDE   VANCOMYCIN, RANDOM   BASIC METABOLIC PANEL   CBC WITH AUTO DIFFERENTIAL   POCT GLUCOSE FINGERSTICK   CBC AND DIFFERENTIAL    Narrative:     The following orders were created for panel order CBC & Differential.  Procedure                               Abnormality         Status                     ---------                               -----------         ------                     CBC Auto Differential[[id number]]        Abnormal            Final result                 Please view results for these tests on the individual orders.   EXTRA TUBES    Narrative:     The following orders were created for panel order Extra Tubes.  Procedure                               Abnormality         Status                     ---------                               -----------         ------                     Light Blue Top[[id number]]                                                               Lavender Top[[id number]]                                     Final result               Gold Top - SST[[id number]]                                   Final result               Green Top (Gel)[[id number]]                                  Final result                 Please view results for these tests on the individual orders.   LAVENDER TOP   GOLD TOP - SST   GREEN TOP   CBC AND DIFFERENTIAL    Narrative:     The following orders were created for panel order CBC & Differential.  Procedure                               Abnormality         Status                     ---------                               -----------         ------                     Scan Slide[[id number]]                                       Final result               CBC Auto Differential[[id number]]        Abnormal            Final result                 Please view results for these tests on the individual orders.   CBC AND DIFFERENTIAL    Narrative:     The following orders were created for panel order CBC & Differential.  Procedure                               Abnormality         Status                     ---------                               -----------         ------                     CBC Auto Differential[[id number]]        Abnormal            Final result                 Please view results for these tests on the individual orders.   EXTRA TUBES    Narrative:     The following orders were created for panel order Extra Tubes.  Procedure                               Abnormality         Status                     ---------                               -----------         ------                     Lavender Top[[id number]]                                     Final result                 Please view results for these tests on the individual orders.   LAVENDER TOP   EXTRA TUBES    Narrative:     The following orders were created for panel order Extra Tubes.  Procedure                               Abnormality         Status                      ---------                               -----------         ------                     Light Blue Top[[id number]]                                                              Lavender Top[[id number]]                                     Final result                 Please view results for these tests on the individual orders.   LAVENDER TOP   EXTRA TUBES    Narrative:     The following orders were created for panel order Extra Tubes.  Procedure                               Abnormality         Status                     ---------                               -----------         ------                     Gold Top - SST[[id number]]                                   Final result                 Please view results for these tests on the individual orders.   GOLD TOP - SST   CBC AND DIFFERENTIAL    Narrative:     The following orders were created for panel order CBC & Differential.  Procedure                               Abnormality         Status                     ---------                               -----------         ------                     CBC Auto Differential[[id number]]                                                         Please view results for these tests on the individual orders.       Xr Knee 3 View Left    Result Date: 8/13/2017  Narrative: PROCEDURE: XR KNEE 3 VW LEFT VIEWS: 3 INDICATION: Cellulitis COMPARISON: None FINDINGS:   -No fracture identified. Diffusely decreased osseous mineralization is present. Tricompartmental degenerative changes are present. There is medial greater than lateral joint space narrowing. There is heterotopic calcification or calcified structures within the joint capsule in the suprapatellar space. Subcutaneous stranding is seen at and superior to the level of the knee, medial greater than lateral.     Impression: CONCLUSION: Subcutaneous soft tissue stranding, probably representing patient's reported cellulitis. No acute osseous abnormality. Note:  if pain or  symptoms persist beyond reasonable expectations and follow-up imaging is anticipated,  cross sectional imaging  (CT and/or MRI) is suggested, as is deemed clinically appropriate. Electronically signed by:  Michelle Earl MD  8/13/2017 1:06 PM CDT Workstation: [id number]    Us Renal Complete    Result Date: 8/13/2017  Narrative: EXAMINATION:  ultrasound, retroperitoneal / renal     CLINICAL INDICATION:  Acute renal failure     HISTORY: COMPARISON: Renal ultrasound  8/9/14 , CT A/P 4/23/17   TECHNIQUE:  ultrasound, renal, standard protocol        FINDINGS:         Kidney, right:       size:  normal     echotexture:  Mildly hyperechoic, progressed          misc.:  Minimal/mild collecting system dilatation   Kidney, left:       size:  normal     echotexture:  Symmetric with the right        misc.:  Markedly dilated central collecting system, overall configuration unchanged from the prior CT    Urinary bladder:  grossly within normal limits         Vascular (visualized portions of - remainder obscured by overlying bowel gas):         Aorta:  normal caliber       IVC:  normal caliber, no intraluminal defect(s)    Misc:  .      Impression: CONCLUSION:  1.  Markedly dilated left renal collecting system, overall configuration unchanged from the prior CT. Suggest retrograde urethrogram. 2. Newly appreciated mild right renal collecting system dilatation. 3. Images provided of the bladder grossly unremarkable. 4. Renal parenchymal echotexture greater than that of the liver implying the presence of chronic medical renal disease. Electronically signed by:  CURRY Lawler MD  8/13/2017 1:43 PM CDT Workstation: [id number]    Xr Chest 1 View    Result Date: 8/13/2017  Narrative: PROCEDURE: Single chest view AP REASON FOR EXAM:ams FINDINGS: Comparison exam dated October 30, 2016. Cardiac and pulmonary vasculature are normal. Left lung base small retrocardiac patchy opacity. Left upper lobe lingula small linear opacity. Lungs  otherwise clear. Pleural spaces are normal. No acute osseous abnormality.     Impression: 1.  Left lung base small retrocardiac patchy opacities suspicious for early pneumonia versus subsegmental atelectasis. 2.  Left upper lobe lingula small focus of discoid atelectasis. Electronically signed by:  Star Jimenez MD  8/13/2017 10:10 AM CDT Workstation: [id number]    Us Venous Doppler Lower Extremity Left (duplex)    Result Date: 8/14/2017  Narrative: Patient Name:  MOODY CALVILLO Patient ID:  [id number] Ordering:  IJEOMA SPENCE Attending:  JERAD TRUONG Referring:  IJEOMA SPENCE ------------------------------------------------ Ultrasound venous duplex left lower extremity HISTORY: Left lower extremity and erythema. Duplex ultrasound of the deep venous system of the left lower extremity was performed Real-time images demonstrate normal compressibility without evidence of intraluminal thrombus. Doppler shows phasic flow and augmentation. Color Doppler also reveals venous patency.     Impression: CONCLUSION: No ultrasound evidence of deep venous thrombosis of the left lower extremity. [id number] Electronically signed by:  Parth Lewis MD  8/14/2017 5:18 PM CDT Workstation: [id number]    Us Venous Doppler Lower Extremity Left (duplex)    Result Date: 8/13/2017  Narrative: Procedure: Left lower extremity venous ultrasound CLINICAL INDICATION: swelling/redness COMPARISON: None FINDINGS: The common femoral,  femoral,  popliteal and calf veins of the left  lower extremity are well identified and compress normally. Doppler signals are heard either spontaneously or on distal compression.  No evidence of intraluminal thrombus was noted.     Impression: CONCLUSION:  No evidence of deep venous thrombosis in the common femoral, femoral, popliteal and calf veins of the left  lower extremity. Electronically signed by:  Star Jimenez MD  8/13/2017 10:48 AM CDT Workstation: [id number]          [facility]    Final diagnoses:   Pneumonia of both  lungs due to infectious organism, unspecified part of lung   Acute renal failure, unspecified acute renal failure type   Hyponatremia   Left leg cellulitis            Omari Rivero MD  08/15/17 [id number]

## 2017-08-13 NOTE — ANESTHESIA PREPROCEDURE EVALUATION
Anesthesia Evaluation     NPO Solid Status: > 6 hours       Airway   Mallampati: III  TM distance: >3 FB  Neck ROM: full  possible difficult intubation  Dental    (+) poor dentition    Pulmonary    (+) pneumonia , COPD mild, sleep apnea, rhonchi,   Cardiovascular     Rhythm: irregular  Rate: normal    (+) hypertension poorly controlled, dysrhythmias PAC, murmur, PVD, hyperlipidemia      Neuro/Psych  (+) psychiatric history Anxiety,    GI/Hepatic/Renal/Endo    (+) obesity, morbid obesity, GERD well controlled,     Musculoskeletal     Abdominal   (+) obese,     Abdomen: tender.   Substance History      OB/GYN          Other                                        Anesthesia Plan    ASA 4 - emergent     general     intravenous induction   Anesthetic plan and risks discussed with patient.

## 2017-08-13 NOTE — CONSULTS
[facility]  [address] [place] [address]. [id number]   - [phone]    [phone]     Consultation         PATIENT  DEMOGRAPHICS   PATIENT NAME: Rex Anthony                      PHYSICIAN: Malika Haile MD  : 1940  MRN: [id number]    Subjective   SUBJECTIVE   Referring Provider: Dr. Rivero  Reason for Consultation: Acute renal failure  History of present illness:     This is a 77 year old  male with PMH HTN, COPD, anxiety/ depression, anemia, h/o urinary retention & hydronephrosis last year who was sent from the nursing home with c/o worsening SOB and left LE pain. He was found to have an elevated creatinine up to 3.79mg/dl. Nephrology is consulted for evaluation and management of ELOISE.    From a renal standpoint, his baseline creatinine is about 1.0mg/dl. His creatinine is increased to 3.79mg/dl on admission. Patient is a poor historian.       Past Medical History:   Diagnosis Date   • Agoraphobia with panic disorder    • Amputation of leg, right, traumatic    • Anemia    • Anxiety disorder    • Astigmatism    • Cataract    • Cellulitis of abdominal wall    • Cerebral atherosclerosis    • Chronic bronchitis    • Chronic ischemic heart disease, unspecified    • Chronic pain syndrome    • Chronic respiratory failure    • Constipation    • Contracture of hand joint    • COPD (chronic obstructive pulmonary disease)    • Cystitis    • Demyelinating disease of central nervous system    • Disturbances of salivary secretion    • Dysphagia    • Essential (primary) hypertension    • Gastrostomy malfunction    • Gastrostomy status    • GERD (gastroesophageal reflux disease)    • Hemiplegia and hemiparesis following unspecified cerebrovascular disease affecting left non-dominant side    • Hydronephrosis    • Hyperlipidemia    • Hypertensive chronic kidney disease    • Insomnia    • Major depressive disorder, single episode    • Mild cognitive impairment    • Obesity   "  • Obstructive sleep apnea    • Other specified cardiac arrhythmias    • Peripheral vascular disease    • Presbyopia    • Suicidal ideation    • Tinea unguium    • Unspecified sequelae of unspecified cerebrovascular disease    • Urinary calculi    • Urinary retention    • UTI (urinary tract infection)    • Vitreous degeneration      History reviewed. No pertinent surgical history.  History reviewed. No pertinent family history.  Social History   Substance Use Topics   • Smoking status: Unknown If Ever Smoked   • Smokeless tobacco: None   • Alcohol use No     Allergies:  Penicillins     REVIEW OF SYSTEMS    Review of Systems   Unable to perform ROS: Mental status change       Objective   OBJECTIVE   Vital Signs  Temp:  [98.8 °F (37.1 °C)-99.7 °F (37.6 °C)] 98.8 °F (37.1 °C)  Heart Rate:  [104-108] 108  Resp:  [18-20] 18  BP: (155-164)/(79-82) 164/82    Flowsheet Rows         First Filed Value    Admission Height  75\" (190.5 cm) Documented at 08/13/2017 0925    Admission Weight  243 lb 12.8 oz (111 kg) Documented at 08/13/2017 0925             PHYSICAL EXAM    Physical Exam   Constitutional: He appears well-developed.   Moderately nourished   HENT:   Head: Normocephalic and atraumatic.   Mouth/Throat: Oropharynx is clear and moist. No oropharyngeal exudate.   Eyes: Pupils are equal, round, and reactive to light. Right eye exhibits no discharge. Left eye exhibits no discharge. No scleral icterus.   Neck: Neck supple. No tracheal deviation present. No thyromegaly present.   Cardiovascular: Normal rate, regular rhythm and normal heart sounds.  Exam reveals no gallop and no friction rub.    No murmur heard.  Pulmonary/Chest: Effort normal. No respiratory distress. He has wheezes. He has no rales. He exhibits no tenderness.   Abdominal: Soft. Bowel sounds are normal. He exhibits no distension and no mass. There is no tenderness. There is no guarding.   Musculoskeletal: He exhibits edema and tenderness.   Right AKA "   Lymphadenopathy:     He has no cervical adenopathy.   Neurological: He is alert.   Skin: Skin is warm and dry. There is erythema.   Nursing note and vitals reviewed.      RESULTS   Results Review:      Results from last 7 days  Lab Units 08/13/17  1007   SODIUM mmol/L 120*   POTASSIUM mmol/L 4.8   CHLORIDE mmol/L 84*   CO2 mmol/L 23.0   BUN mg/dL 70*   CREATININE mg/dL 3.79*   CALCIUM mg/dL 8.7   BILIRUBIN mg/dL 0.8   ALK PHOS U/L 251*   ALT (SGPT) U/L 41   AST (SGOT) U/L 27   GLUCOSE mg/dL 116*       Estimated Creatinine Clearance: 21.3 mL/min (by C-G formula based on Cr of 3.79).      Results from last 7 days  Lab Units 08/13/17  1007   WBC 10*3/mm3 10.47*   HEMOGLOBIN g/dL 11.9*   PLATELETS 10*3/mm3 152         Results from last 7 days  Lab Units 08/13/17  1055   INR  1.09        MEDICATIONS      aztreonam 1 g Intravenous Q8H   And      vancomycin 15 mg/kg Intravenous Once   levoFLOXacin 500 mg Intravenous Q48H   sodium chloride 1,000 mL Intravenous Once       Pharmacy to dose vancomycin     sodium chloride 125 mL/hr Last Rate: 125 mL/hr (08/13/17 1154)       (Not in a hospital admission)  Assessment/Plan   ASSESSMENT / PLAN    Active Problems:    Pneumonia of both lungs due to infectious organism    1. Acute renal failure: Baseline creatinine is about 1.0mg/dl  - Etiology is most likely pre-renal with decreased PO intake. This is suggested by rising BUN. Need to rule out post renal etiology with his history of urinary retention.   - Will check UA, urine sodium, urine creatinine and renal US. Will place a you catheter.  - Hold nephrotoxins including NSAIDs and IV contrast if possible    2. Hyponatremia:   - Most likely hypovolemic hyponatremia  - Received NS bolus in ER. Currently on NS @ 125ml/hr.   - Will check urine studies and serum osmolality. Recheck BMP in PM.     3. Pneumonia:  - On antibiotics. Dose antibiotics for renal function    4. Hypertension:  - Continue home blood pressure medications    5.  Anemia    6. h/o urinary retention & hydronephrosis last year       I discussed the patients findings and my recommendations with patient and nursing staff    This document has been electronically signed by Malika Haile MD on August 13, 2017 12:07 PM

## 2017-08-13 NOTE — PROGRESS NOTES
"Pharmacokinetics by Pharmacy - Vancomycin    Rex Anthony is a 77 y.o. male  [Ht: 75\" (190.5 cm); Wt: 254 lb 11.2 oz (116 kg)]    Estimated Creatinine Clearance: 21.7 mL/min (by C-G formula based on Cr of 3.79).   Lab Results   Component Value Date    CREATININE 3.79 (H) 08/13/2017    CREATININE 1.01 04/23/2017    CREATININE 1.0 10/30/2016    CREATININE 0.9 08/15/2016    CREATININE 0.8 08/14/2016      Lab Results   Component Value Date    WBC 10.70 (H) 08/13/2017    WBC 10.47 (H) 08/13/2017    WBC 12.33 (H) 04/23/2017      Temp Readings from Last 1 Encounters:   08/13/17 98.8 °F (37.1 °C) (Oral)      Lab Results   Component Value Date    VANCOTROUGH 20.6 (H) 08/14/2016    VANCORANDOM 18.7 08/06/2016         Culture Results:  Microbiology Results (last 10 days)       Procedure Component Value - Date/Time      Urine Culture [[id number]] Collected:  08/08/17 1308    Lab Status:  Final result Specimen:  Urine from Urine, Clean Catch Updated:  08/09/17 0632     Urine Culture Mixed Culture    Narrative:         Specimen contains mixed organisms of questionable pathogenicity which indicates contamination with commensal britta.  Further identification is unlikely to provide clinically useful information.  Suggest recollection.               Indication for use: Pneumonia    Current Vancomycin Dose:  1750 mg IVPB every one time.      Assessment/Plan:  Will check random level tomorrow due to poor renal function. Pharmacy will continue to monitor renal function and adjust dose accordingly.    Fred Wadsworth III, [facility]   08/13/17 3:01 PM    "

## 2017-08-13 NOTE — CONSULTS
Referring Provider: DR. TRUONG   Reason for Consultation: URINARY RETENTION    Patient Care Team:  Mingo Rodriguez MD as PCP - General    Chief complaint URINARY RETENTION     Subjective .     History of present illness:  PATIENT BROUGHT TO ER TODAY FOR TREATMENT OF PNEUMONIA.  PVR FOUND 700 ML RESIDUAL IN BLADDER AND HAVING OVER-FLOW INCONTINENCE.  NURSING FACILITY WHERE HE RESIDES REMOVED LEE CATHETER. UNSURE OF REASON FOR THE REMOVAL OF LEE.   NURSES NOR I CAN PLACE A LEE CATHETER AT THIS TIME.  HE WILL NEED A CYSTOSCOPY TO PLACE.  VERBAL CONSENT OBTAINED AFTER SPEAKING TO FAMILY.    Review of Systems  Pertinent items are noted in HPI    History  Past Medical History:   Diagnosis Date   • Agoraphobia with panic disorder    • Amputation of leg, right, traumatic    • Anemia    • Anxiety disorder    • Astigmatism    • Cataract    • Cellulitis of abdominal wall    • Cerebral atherosclerosis    • Chronic bronchitis    • Chronic ischemic heart disease, unspecified    • Chronic pain syndrome    • Chronic respiratory failure    • Constipation    • Contracture of hand joint    • COPD (chronic obstructive pulmonary disease)    • Cystitis    • Demyelinating disease of central nervous system    • Disturbances of salivary secretion    • Dysphagia    • Essential (primary) hypertension    • Gastrostomy malfunction    • Gastrostomy status    • GERD (gastroesophageal reflux disease)    • Hemiplegia and hemiparesis following unspecified cerebrovascular disease affecting left non-dominant side    • Hydronephrosis    • Hyperlipidemia    • Hypertensive chronic kidney disease    • Insomnia    • Major depressive disorder, single episode    • Mild cognitive impairment    • Obesity    • Obstructive sleep apnea    • Other specified cardiac arrhythmias    • Peripheral vascular disease    • Presbyopia    • Suicidal ideation    • Tinea unguium    • Unspecified sequelae of unspecified cerebrovascular disease    • Urinary calculi     • Urinary retention    • UTI (urinary tract infection)    • Vitreous degeneration    , History reviewed. No pertinent surgical history., History reviewed. No pertinent family history.,   Social History   Substance Use Topics   • Smoking status: Unknown If Ever Smoked   • Smokeless tobacco: None   • Alcohol use No   ,   Prescriptions Prior to Admission   Medication Sig Dispense Refill Last Dose   • albuterol (PROVENTIL HFA;VENTOLIN HFA) 108 (90 BASE) MCG/ACT inhaler Inhale 2 puffs Every 4 (Four) Hours As Needed for Wheezing.      • albuterol (PROVENTIL) (5 MG/ML) 0.5% nebulizer solution Take 2.5 mg by nebulization Every 6 (Six) Hours As Needed for Wheezing.      • ALPRAZolam (XANAX) 0.25 MG tablet Take 0.25 mg by mouth Daily.      • ALPRAZolam (XANAX) 0.5 MG tablet Take 0.5 mg by mouth Daily.      • Amino Acids-Protein Hydrolys (PRO-STAT AWC PO) Take 30 mL by mouth Daily.      • amLODIPine (NORVASC) 10 MG tablet Take 10 mg by mouth Daily.      • CloNIDine (CATAPRES) 0.2 MG tablet Take 0.2 mg by mouth 1 (One) Time Per Week.      • docusate sodium (COLACE) 150 MG/15ML liquid Take 50 mg by mouth Daily.      • dry mouth gel (BIOTENE ORALBALANCE) gel Apply  to the mouth or throat 3 (Three) Times a Day As Needed for dry mouth.      • fentaNYL (DURAGESIC) 25 MCG/HR patch Place 1 patch on the skin Every 72 (Seventy-Two) Hours.      • FLUoxetine (PROzac) 40 MG capsule Take 40 mg by mouth 2 (Two) Times a Day.      • furosemide (LASIX) 20 MG tablet Take 20 mg by mouth 2 (Two) Times a Day.      • HYDROcodone-acetaminophen (NORCO)  MG per tablet Take 1 tablet by mouth Every 4 (Four) Hours As Needed for Moderate Pain (4-6).      • Hydrocortisone (PATRICK'S MAGIC BUTT CREAM) Apply 1 application topically 2 (Two) Times a Day.      • levoFLOXacin (LEVAQUIN) 500 MG tablet Take 1 tablet by mouth Daily. 10 tablet 0    • magnesium hydroxide (MILK OF MAGNESIA) 400 MG/5ML suspension Take  by mouth 2 (Two) Times a Day As Needed for  Constipation.      • nortriptyline (PAMELOR) 50 MG capsule Take 50 mg by mouth Every Night.      • omeprazole in sodium bicarbonate injection 8.4% Take 10 mg by mouth Daily.      • polyethylene glycol (MIRALAX) packet Take 17 g by mouth Daily.      • promethazine (PHENERGAN) 25 MG tablet Take 25 mg by mouth Every 4 (Four) Hours.      • Sodium Phosphates (FLEET ENEMA) 7-19 GM/118ML enema Insert  into the rectum 1 (One) Time As Needed.      • tamsulosin (FLOMAX) 0.4 MG capsule 24 hr capsule Take 1 capsule by mouth Daily. 30 capsule 0     and Allergies:  Penicillins    Objective     Vital Signs   Temp:  [98.8 °F (37.1 °C)-99.7 °F (37.6 °C)] 98.8 °F (37.1 °C)  Heart Rate:  [] 99  Resp:  [18-20] 20  BP: (155-164)/(79-82) 157/82    Physical Exam:     General Appearance:    Cooperative, in no acute distress   Head:    Normocephalic, without obvious abnormality, atraumatic   Eyes:            Lids and lashes normal, conjunctivae and sclerae normal, no   icterus, no pallor, corneas clear, PERRLA   Ears:    Ears appear intact with no abnormalities noted   Throat:   DEFERRED.   Lungs:     DECREASED BREATH SOUNDS. PNEUMONIA.             Heart:    DEFERRED.   Abdomen:     SUPRA-PUBIC AREA TENDER.   Genitourinary:    ML. OVER FLOW INCONTINENCE.   Rectal:     DEFERRED.   Extremities:   RIGHT LEG AMPUTEE.   Pulses:   Pulses palpable and equal bilaterally   Skin:   NOT EXAMINED.   Neurologic:   DEFERRED.       Results Review:    Lab Results (last 24 hours)     Procedure Component Value Units Date/Time    CBC & Differential [[id number]] Collected:  08/13/17 1007    Specimen:  Blood Updated:  08/13/17 1031    Narrative:       The following orders were created for panel order CBC & Differential.  Procedure                               Abnormality         Status                     ---------                               -----------         ------                     CBC Auto Differential[[id number]]        Abnormal             Final result                 Please view results for these tests on the individual orders.    CBC Auto Differential [[id number]]  (Abnormal) Collected:  08/13/17 1007    Specimen:  Blood Updated:  08/13/17 1031     WBC 10.47 (H) 10*3/mm3      RBC 4.57 10*6/mm3      Hemoglobin 11.9 (L) g/dL      Hematocrit 36.4 (L) %      MCV 79.6 (L) fL      MCH 26.0 (L) pg      MCHC 32.7 g/dL      RDW 17.2 (H) %      RDW-SD 50.0 (H) fl      MPV 11.1 fL      Platelets 152 10*3/mm3      Neutrophil % 78.9 %      Lymphocyte % 8.4 (L) %      Monocyte % 9.4 %      Eosinophil % 2.9 %      Basophil % 0.2 %      Immature Grans % 0.2 %      Neutrophils, Absolute 8.27 10*3/mm3      Lymphocytes, Absolute 0.88 10*3/mm3      Monocytes, Absolute 0.98 (H) 10*3/mm3      Eosinophils, Absolute 0.30 10*3/mm3      Basophils, Absolute 0.02 10*3/mm3      Immature Grans, Absolute 0.02 10*3/mm3      nRBC 0.0 /100 WBC     Lactic Acid, Plasma [[id number]]  (Normal) Collected:  08/13/17 1007    Specimen:  Blood Updated:  08/13/17 1042     Lactate 0.8 mmol/L     Lipase [[id number]]  (Normal) Collected:  08/13/17 1007    Specimen:  Blood Updated:  08/13/17 1043     Lipase 49 U/L     CK [[id number]]  (Abnormal) Collected:  08/13/17 1007    Specimen:  Blood Updated:  08/13/17 1043     Creatine Kinase 25 (L) U/L     Comprehensive Metabolic Panel [[id number]]  (Abnormal) Collected:  08/13/17 1007    Specimen:  Blood Updated:  08/13/17 1047     Glucose 116 (H) mg/dL      BUN 70 (H) mg/dL      Creatinine 3.79 (H) mg/dL      Sodium 120 (C) mmol/L      Potassium 4.8 mmol/L      Chloride 84 (L) mmol/L      CO2 23.0 mmol/L      Calcium 8.7 mg/dL      Total Protein 7.5 g/dL      Albumin 3.40 g/dL      ALT (SGPT) 41 U/L      AST (SGOT) 27 U/L      Alkaline Phosphatase 251 (H) U/L      Total Bilirubin 0.8 mg/dL      eGFR Non African Amer 15 (L) mL/min/1.73      Globulin 4.1 (H) gm/dL      A/G Ratio 0.8 (L) g/dL      BUN/Creatinine Ratio 18.5     Anion Gap 13.0 mmol/L      Narrative:       The MDRD GFR formula is only valid for adults with stable renal function between ages 18 and 70.    CK-MB [[id number]]  (Normal) Collected:  08/13/17 1007    Specimen:  Blood Updated:  08/13/17 1055     CKMB 0.84 ng/mL     Troponin [[id number]]  (Normal) Collected:  08/13/17 1007    Specimen:  Blood Updated:  08/13/17 1055     Troponin I 0.027 ng/mL     Blood Culture [[id number]] Collected:  08/13/17 1055    Specimen:  Blood from Arm, Left Updated:  08/13/17 1103    Protime-INR [[id number]]  (Normal) Collected:  08/13/17 1055    Specimen:  Blood Updated:  08/13/17 1122     Protime 14.0 Seconds      INR 1.09    Narrative:       Therapeutic range for most indications is 2.0-3.0 INR,  or 2.5-3.5 for mechanical heart valves.    aPTT [[id number]]  (Abnormal) Collected:  08/13/17 1055    Specimen:  Blood Updated:  08/13/17 1122     PTT 42.1 (H) seconds     Narrative:       The recommended Heparin therapeutic range is 68-97 seconds.    Lavender Top [[id number]] Collected:  08/13/17 1055    Specimen:  Blood Updated:  08/13/17 1201     Extra Tube hold for add-on      Auto resulted       Gold Top - SST [[id number]] Collected:  08/13/17 1055    Specimen:  Blood Updated:  08/13/17 1201     Extra Tube Hold for add-ons.      Auto resulted.       Green Top (Gel) [[id number]] Collected:  08/13/17 1055    Specimen:  Blood Updated:  08/13/17 1201     Extra Tube Hold for add-ons.      Auto resulted.       Blood Culture [[id number]] Collected:  08/13/17 1007    Specimen:  Blood from Arm, Right Updated:  08/13/17 1201    Osmolality, Serum [[id number]]  (Normal) Collected:  08/13/17 1055    Specimen:  Blood Updated:  08/13/17 1252     Osmolality 286 mOsm/kg     Extra Tubes [[id number]] Collected:  08/13/17 1055    Specimen:  Blood from Blood, Venous Line Updated:  08/13/17 1423    Narrative:       The following orders were created for panel order Extra Tubes.  Procedure                               Abnormality         Status                      ---------                               -----------         ------                     Light Blue Top[[id number]]                                                              Lavender Top[[id number]]                                     Final result               Gold Top - SST[[id number]]                                   Final result               Green Top (Gel)[[id number]]                                  Final result                 Please view results for these tests on the individual orders.    CBC Auto Differential [[id number]]  (Abnormal) Collected:  08/13/17 1055    Specimen:  Blood Updated:  08/13/17 1447     WBC 10.70 (H) 10*3/mm3      RBC 4.77 10*6/mm3      Hemoglobin 12.5 (L) g/dL      Hematocrit 38.0 (L) %      MCV 79.7 (L) fL      MCH 26.2 (L) pg      MCHC 32.9 g/dL      RDW 17.3 (H) %      RDW-SD 50.2 (H) fl      MPV 11.7 fL      Platelets 164 10*3/mm3      Neutrophil % 81.0 (H) %      Lymphocyte % 6.6 (L) %      Monocyte % 10.1 %      Eosinophil % 2.0 %      Basophil % 0.2 %      Immature Grans % 0.1 %      Neutrophils, Absolute 8.67 (H) 10*3/mm3      Lymphocytes, Absolute 0.71 10*3/mm3      Monocytes, Absolute 1.08 (H) 10*3/mm3      Eosinophils, Absolute 0.21 10*3/mm3      Basophils, Absolute 0.02 10*3/mm3      Immature Grans, Absolute 0.01 10*3/mm3      nRBC 0.0 /100 WBC     CRE Screen by PCR [[id number]] Collected:  08/13/17 1418    Specimen:  Swab from Large Intestine, Rectum Updated:  08/13/17 1504    Blood Gas, Arterial [[id number]]  (Abnormal) Collected:  08/13/17 1449    Specimen:  Arterial Blood Updated:  08/13/17 1524     Site --      Not performed at this site.        Ananth's Test --      Not performed at this site.        pH, Arterial 7.417 pH units      pCO2, Arterial 39.4 mm Hg      pO2, Arterial 60.6 (L) mm Hg      HCO3, Arterial 24.8 mmol/L      Base Excess, Arterial 0.4 mmol/L      O2 Saturation, Arterial 91.1 %      Hemoglobin, Blood Gas 12.4 (L) g/dL      Hematocrit, Blood  Gas 36.0 (L) %      CO2 Content 26.0     Sodium, Arterial 122.7 (L) mmol/L      Potassium, Arterial 4.77 mmol/L      Glucose, Arterial 138 mmol/L      Barometric Pressure for Blood Gas -- mmHg       Not performed at this site.        Modality --      Not performed at this site.        Ionized Calcium 4.6 mg/dL     Basic Metabolic Panel [[id number]]  (Abnormal) Collected:  08/13/17 1511    Specimen:  Blood Updated:  08/13/17 1609     Glucose 136 (H) mg/dL      BUN 72 (H) mg/dL      Creatinine 3.78 (H) mg/dL      Sodium 118 (C) mmol/L      Potassium 4.6 mmol/L      Chloride 89 (L) mmol/L      CO2 19.0 (L) mmol/L      Calcium 8.7 mg/dL      eGFR Non African Amer 16 (L) mL/min/1.73      BUN/Creatinine Ratio 19.0     Anion Gap 10.0 mmol/L     Narrative:       The MDRD GFR formula is only valid for adults with stable renal function between ages 18 and 70.    Scan Slide [[id number]] Collected:  08/13/17 1055    Specimen:  Blood Updated:  08/13/17 1638     Anisocytosis Slight/1+     Hypochromia Slight/1+     Microcytes Slight/1+     WBC Morphology Normal     Platelet Estimate Adequate     Large Platelets Slight/1+    CBC & Differential [[id number]] Collected:  08/13/17 1055    Specimen:  Blood Updated:  08/13/17 1638    Narrative:       The following orders were created for panel order CBC & Differential.  Procedure                               Abnormality         Status                     ---------                               -----------         ------                     Scan Slide[[id number]]                                       Final result               CBC Auto Differential[[id number]]        Abnormal            Final result                 Please view results for these tests on the individual orders.         Imaging Results (last 24 hours)     Procedure Component Value Units Date/Time    XR Chest 1 View [[id number]] Collected:  08/13/17 0947     Updated:  08/13/17 1011    Narrative:         PROCEDURE: Single chest  view AP    REASON FOR EXAM:ams    FINDINGS: Comparison exam dated October 30, 2016. Cardiac and  pulmonary vasculature are normal. Left lung base small  retrocardiac patchy opacity. Left upper lobe lingula small linear  opacity. Lungs otherwise clear. Pleural spaces are normal. No  acute osseous abnormality.      Impression:       1.  Left lung base small retrocardiac patchy opacities suspicious  for early pneumonia versus subsegmental atelectasis.  2.  Left upper lobe lingula small focus of discoid atelectasis.    Electronically signed by:  Star Jimenez MD  8/13/2017 10:10 AM CDT  Workstation: [id number]    US Venous Doppler Lower Extremity Left (duplex) [[id number]] Collected:  08/13/17 1010     Updated:  08/13/17 1049    Narrative:       Procedure: Left lower extremity venous ultrasound      CLINICAL INDICATION: swelling/redness      COMPARISON: None    FINDINGS:    The common femoral,  femoral,  popliteal and calf veins of the  left  lower extremity are well identified and compress normally.   Doppler signals are heard either spontaneously or on distal  compression.  No evidence of intraluminal thrombus was noted.         Impression:       CONCLUSION:  No evidence of deep venous thrombosis in the common  femoral, femoral, popliteal and calf veins of the left  lower  extremity.    Electronically signed by:  Star Jimenez MD  8/13/2017 10:48 AM CDT  Workstation: [id number]    XR Knee 3 View Left [[id number]] Collected:  08/13/17 1235     Updated:  08/13/17 1307    Narrative:       PROCEDURE: XR KNEE 3 VW LEFT    VIEWS: 3    INDICATION: Cellulitis    COMPARISON: None    FINDINGS:     -No fracture identified. Diffusely decreased osseous  mineralization is present. Tricompartmental degenerative changes  are present. There is medial greater than lateral joint space  narrowing. There is heterotopic calcification or calcified  structures within the joint capsule in the suprapatellar space.  Subcutaneous stranding is seen at and  superior to the level of  the knee, medial greater than lateral.      Impression:       CONCLUSION: Subcutaneous soft tissue stranding, probably  representing patient's reported cellulitis. No acute osseous  abnormality.    Note:  if pain or symptoms persist beyond reasonable expectations    and follow-up imaging is anticipated,  cross sectional imaging   (CT and/or MRI) is suggested, as is deemed clinically   appropriate.    Electronically signed by:  Michelle Earl MD  8/13/2017 1:06 PM CDT  Workstation: [id number]    US Renal Complete [[id number]] Collected:  08/13/17 1256     Updated:  08/13/17 1344    Narrative:         EXAMINATION:  ultrasound, retroperitoneal / renal       CLINICAL INDICATION:  Acute renal failure       HISTORY:  COMPARISON: Renal ultrasound  8/9/14 , CT A/P 4/23/17     TECHNIQUE:  ultrasound, renal, standard protocol            FINDINGS:             Kidney, right:         size:  normal      echotexture:  Mildly hyperechoic, progressed            misc.:  Minimal/mild collecting system dilatation      Kidney, left:         size:  normal      echotexture:  Symmetric with the right          misc.:  Markedly dilated central collecting system, overall  configuration unchanged from the prior CT       Urinary bladder:  grossly within normal limits             Vascular (visualized portions of - remainder obscured by  overlying bowel gas):           Aorta:  normal caliber         IVC:  normal caliber, no intraluminal defect(s)        Misc:      .      Impression:       CONCLUSION:    1.  Markedly dilated left renal collecting system, overall  configuration unchanged from the prior CT.  Suggest retrograde urethrogram.  2. Newly appreciated mild right renal collecting system  dilatation.   3. Images provided of the bladder grossly unremarkable.  4. Renal parenchymal echotexture greater than that of the liver  implying the presence of chronic medical renal disease.    Electronically signed by:  CURRY  Leroy Lawler MD  8/13/2017 1:43  PM CDT Workstation: [id number]          I reviewed the patient's new clinical results.  I reviewed the patient's new imaging results and agree with the interpretation.  I reviewed the patient's other test results and agree with the interpretation      Assessment/Plan     Active Problems:    Pneumonia of both lungs due to infectious organism      URINARY RETENTION WITH OVER-FLOW INCONTINENCE.  WILL NEED CYSTOSCOPY TO PLACE LEE CATHETER.  I HAVE SPOKE TO FAMILY AND EXPLAINED THE RISKS AND BENEFITS.  THEY WISH TO PROCEED WITH PROCEDURE.    I discussed the patients findings and my recommendations with patient AND FAMILY..     Christian Crawford MD  08/13/17  4:59 PM

## 2017-08-13 NOTE — OP NOTE
CYSTOSCOPY  Procedure Note    Rex Anthony  8/13/2017    Pre-op Diagnosis:   Retention of urine, unspecified [R33.9]    Post-op Diagnosis:     Post-Op Diagnosis Codes:     * Retention of urine, unspecified [R33.9]      Procedure(s):  CYSTOSCOPY WITH CATHETER PLACEMENT    Surgeon(s):  Christian Crawford MD    Anesthesia: Choice    Staff:   Circulator: Che Tyson RN  Scrub Person: Waldemar Hernandez    Estimated Blood Loss: *No blood loss documented*    Specimens:                * No specimens in log *      Drains:           Findings: Severe bladder neck contracture    Complications: Hard to dilate    Indications: Urinary retention urinary tract infection PVR greater than 700 cc    Description of Procedure: Patient was brought to cystoscopy and placed in the supine position.  We did a sterile prep and drape of the genitalia routine fashion placed a flexible cystoscope down the urethra under 2% Xylocaine gel.  I found the bladder neck contracture and placed a 0.38 guidewire through this .  I used the navigator dilator and dilated the urethra to 18 Frisian and the bladder neck.  Put the 0.38 guidewire further in the bladder then over top (scope we placed a 14 Frisian coudé catheter and made into a [place].  Urine C&S was obtained 10 cc placed in the balloon and the patient taken back to his room.    Christian Crawford MD     Date: 8/13/2017  Time: 6:06 PM

## 2017-08-13 NOTE — PLAN OF CARE
Problem: Pneumonia (Adult)  Goal: Signs and Symptoms of Listed Potential Problems Will be Absent or Manageable (Pneumonia)  Outcome: Ongoing (interventions implemented as appropriate)    Problem: Skin Integrity Impairment, Risk/Actual (Adult)  Goal: Skin Integrity/Wound Healing  Outcome: Ongoing (interventions implemented as appropriate)    Problem: Fall Risk (Adult)  Goal: Absence of Falls  Outcome: Ongoing (interventions implemented as appropriate)

## 2017-08-14 PROBLEM — N30.00 ACUTE CYSTITIS: Status: ACTIVE | Noted: 2017-01-01

## 2017-08-14 PROBLEM — E87.1 HYPONATREMIA: Status: ACTIVE | Noted: 2017-01-01

## 2017-08-14 PROBLEM — N17.9 ACUTE KIDNEY INJURY (HCC): Status: ACTIVE | Noted: 2017-01-01

## 2017-08-14 PROBLEM — L03.116 CELLULITIS OF LEFT KNEE: Status: ACTIVE | Noted: 2017-01-01

## 2017-08-14 NOTE — NURSING NOTE
Paged Dr. Kramer regarding patient's urine output.  Patient has had 6300ml out since 1900.  Explained that the you was replaced by Dr. Crawford earlier this evening, and informed Dr. Kramer of the resulted labs. No new orders at this time.  Dr. Kramer stated to continue course of treatment at this time.

## 2017-08-14 NOTE — CONSULTS
"Adult Nutrition  Assessment    Patient Name:  Rex Anthony  YOB: 1940  MRN: [id number]  Admit Date:  8/13/2017    Assessment Date:  8/14/2017                  Labs/Tests/Procedures/Meds       08/14/17 1405    Labs/Tests/Procedures/Meds    Labs/Tests Review Reviewed;Glucose;Na+;BUN;Creat;C-React PRO;Hgb Hct    Medication Review Reviewed, pertinent    Significant Vitals reviewed              Physical Findings       08/14/17 1409    Physical Appearance    Overall Physical Appearance amputee      08/14/17 1407    Physical Findings/Assessment    Additional Documentation Physical Appearance (Group)    Physical Appearance    Overall Physical Appearance overweight            Estimated/Assessed Needs       08/14/17 1407    Calculation Measurements    Weight Used For Calculations 94 kg (207 lb 3.7 oz)    Height Used for Calculations 1.905 m (6' 3\")    Estimated/Assessed Energy Needs    Energy Need Method Bolivar-St Jeor    Age 77    RMR (Bolivar-St. Jeor Equation) 1740.62    Total estimated needs (Bolivar St. Jeor) 2262    Estimated Kcal Range  5113-6595    Estimated/Assessed Protein Needs    Weight Used for Protein Calculation 94 kg (207 lb 3.7 oz)    Protein (gm/kg) 1.2    1.2 Gm Protein (gm) 112.8    Estimated/Assessed Fluid Needs    Fluid Need Method RDA method    RDA Method (mL)  2200            Nutrition Prescription Ordered       08/14/17 1410    Nutrition Prescription EN    Enteral Route PEG    Product Osmolite 1.5 kathy    TF Delivery Method Bolus    TF Bolus Frequency 5 times a day    Water Flush Frequency Every 2 hours              Comments:  Spoke with nursing re tube feeding order.called [facility] to speak to nurse there. Pt gets 2.0 kathy for tube feeding at 8am, 12pm, flushes at 4pm,8 pm and 12 am.  will use osmolite 1.5 here and will need to add a feeding ( 5 cans/day) to meet calories / amount of tube feeding as he receives at [place].    Daughter gave history for pt who is " confused today. She doesn't want pt to hear of discussion of dialysis.daughter reports pt is interested in weight loss. Dietitian there is not working today to discuss with her. Pt has been on tube feeding for 2 years and is tolerating well.   RDN Suggests tube feeding-osmolite 1.5- at 8am 180 cc , 240 cc at 12noon, 180 cc at 4pm (now only takes flushes at 4pm) 240 cc at 8pm and 240cc at 12am. Will write order per md. Provided tube feeding for nursing to bolus. RDN STaff to continue to monitor.        Electronically signed by:  Jaqueline Steven RD  08/14/17 2:15 PM

## 2017-08-14 NOTE — PROGRESS NOTES
"Pharmacokinetics by Pharmacy - Vancomycin    Rex Anthony is a 77 y.o. male  [Ht: 75\" (190.5 cm); Wt: 254 lb 11.2 oz (116 kg)]    Estimated Creatinine Clearance: 21.9 mL/min (by C-G formula based on Cr of 3.75).   Lab Results   Component Value Date    CREATININE 3.75 (H) 08/14/2017    CREATININE 3.52 (H) 08/13/2017    CREATININE 3.78 (H) 08/13/2017      Lab Results   Component Value Date    WBC 11.93 (H) 08/14/2017    WBC 10.70 (H) 08/13/2017    WBC 10.47 (H) 08/13/2017      Temp Readings from Last 1 Encounters:   08/14/17 97.4 °F (36.3 °C) (Axillary)      Lab Results   Component Value Date    VANCOTROUGH 20.6 (H) 08/14/2016    VANCORANDOM 18.26 08/14/2017         Culture Results:  Microbiology Results (last 10 days)       Procedure Component Value - Date/Time      Urine Culture [[id number]]  (Normal) Collected:  08/13/17 1809    Lab Status:  Preliminary result Specimen:  Urine from Urine, Catheter Updated:  08/14/17 0604     Urine Culture Culture in progress    CRE Screen by PCR [[id number]] Collected:  08/13/17 1418    Lab Status:  Final result Specimen:  Swab from Large Intestine, Rectum Updated:  08/13/17 1700     CRE SCREEN Not Detected      This test is performed by utilizing real time polymerace chain recation (PCR).         OXA 48 Strain Not Detected      Not Applicable.        IMP STRAIN Not Detected      Not Applicable        VIM STRAING Not Detected      Not Applicable        NDM Strain Not Detected      Not Applicable        KPC Strain Not Detected      Not Applicable       Blood Culture [[id number]]  (Normal) Collected:  08/13/17 1055    Lab Status:  Preliminary result Specimen:  Blood from Arm, Left Updated:  08/14/17 0001     Blood Culture No growth at less than 24 hours    Blood Culture [[id number]]  (Normal) Collected:  08/13/17 1007    Lab Status:  Preliminary result Specimen:  Blood from Arm, Right Updated:  08/14/17 0101     Blood Culture No growth at less than 24 hours    Urine Culture " [[id number]] Collected:  08/08/17 1308    Lab Status:  Final result Specimen:  Urine from Urine, Clean Catch Updated:  08/09/17 0632     Urine Culture Mixed Culture    Narrative:         Specimen contains mixed organisms of questionable pathogenicity which indicates contamination with commensal britta.  Further identification is unlikely to provide clinically useful information.  Suggest recollection.               Indication for use: pneumonia    Current Vancomycin Dose:  1500 mg IVPB every 48 hours, day 2 of therapy.      Assessment/Plan:  Chart reviewed. Based on return of random level and renal function remaining fairly constant, will schedule vancomycin 1500 mg q48 hours. Will collect trough on 8/17. Pharmacy will continue to monitor renal function and adjust dose accordingly.    Nadir Asher [facility]   08/14/17 11:16 AM

## 2017-08-14 NOTE — PROGRESS NOTES
"   LOS: 1 day   Patient Care Team:  Mingo Rodriguez MD as PCP - General    Subjective     Subjective:  Symptoms:  No shortness of breath or chest pain.  (Speech is garbled but denies Casanova, flank, or suprapubic pain, states he appreciates Dr. Crawford helping, very pleasant. Urine clear and yellow in gravity bag, no drainage around catheter. No fever. ).    Diet:  No vomiting.    Pain:  He reports no pain.        History taken from: patient chart    Objective     Vital Signs  Temp:  [97.4 °F (36.3 °C)-98.8 °F (37.1 °C)] 97.4 °F (36.3 °C)  Heart Rate:  [] 94  Resp:  [16-20] 16  BP: (113-164)/(70-88) 113/70    Objective:  General Appearance:  In no acute distress.    Vital signs: (most recent): Blood pressure 113/70, pulse 94, temperature 97.4 °F (36.3 °C), temperature source Axillary, resp. rate 16, height 75\" (190.5 cm), weight 254 lb 11.2 oz (116 kg), SpO2 93 %.  No fever.    Output: Producing urine (Casanova clear yellow).    HEENT: Normal HEENT exam.    Lungs:  Normal respiratory rate and normal effort.  There are decreased breath sounds.    Heart: Normal rate.  S1 normal and S2 normal.    Chest: Symmetric chest wall expansion.   Abdomen: Abdomen is soft.  There are signs of ascites. Bowel sounds are normal.   There is suprapubic tenderness.    Extremities: There is dependent edema (LLE).  (Right AKA)  Neurological: Patient is alert.  (Confused).    Pupils:  Pupils are equal, round, and reactive to light.    Skin:  Warm, dry and pale.  No ecchymosis or cyanosis.             Results Review:    Lab Results (last 24 hours)     Procedure Component Value Units Date/Time    CBC & Differential [[id number]] Collected:  08/13/17 1007    Specimen:  Blood Updated:  08/13/17 1031    Narrative:       The following orders were created for panel order CBC & Differential.  Procedure                               Abnormality         Status                     ---------                               -----------         ------    "                  CBC Auto Differential[[id number]]        Abnormal            Final result                 Please view results for these tests on the individual orders.    CBC Auto Differential [[id number]]  (Abnormal) Collected:  08/13/17 1007    Specimen:  Blood Updated:  08/13/17 1031     WBC 10.47 (H) 10*3/mm3      RBC 4.57 10*6/mm3      Hemoglobin 11.9 (L) g/dL      Hematocrit 36.4 (L) %      MCV 79.6 (L) fL      MCH 26.0 (L) pg      MCHC 32.7 g/dL      RDW 17.2 (H) %      RDW-SD 50.0 (H) fl      MPV 11.1 fL      Platelets 152 10*3/mm3      Neutrophil % 78.9 %      Lymphocyte % 8.4 (L) %      Monocyte % 9.4 %      Eosinophil % 2.9 %      Basophil % 0.2 %      Immature Grans % 0.2 %      Neutrophils, Absolute 8.27 10*3/mm3      Lymphocytes, Absolute 0.88 10*3/mm3      Monocytes, Absolute 0.98 (H) 10*3/mm3      Eosinophils, Absolute 0.30 10*3/mm3      Basophils, Absolute 0.02 10*3/mm3      Immature Grans, Absolute 0.02 10*3/mm3      nRBC 0.0 /100 WBC     Lactic Acid, Plasma [[id number]]  (Normal) Collected:  08/13/17 1007    Specimen:  Blood Updated:  08/13/17 1042     Lactate 0.8 mmol/L     Lipase [[id number]]  (Normal) Collected:  08/13/17 1007    Specimen:  Blood Updated:  08/13/17 1043     Lipase 49 U/L     CK [[id number]]  (Abnormal) Collected:  08/13/17 1007    Specimen:  Blood Updated:  08/13/17 1043     Creatine Kinase 25 (L) U/L     Comprehensive Metabolic Panel [[id number]]  (Abnormal) Collected:  08/13/17 1007    Specimen:  Blood Updated:  08/13/17 1047     Glucose 116 (H) mg/dL      BUN 70 (H) mg/dL      Creatinine 3.79 (H) mg/dL      Sodium 120 (C) mmol/L      Potassium 4.8 mmol/L      Chloride 84 (L) mmol/L      CO2 23.0 mmol/L      Calcium 8.7 mg/dL      Total Protein 7.5 g/dL      Albumin 3.40 g/dL      ALT (SGPT) 41 U/L      AST (SGOT) 27 U/L      Alkaline Phosphatase 251 (H) U/L      Total Bilirubin 0.8 mg/dL      eGFR Non African Amer 15 (L) mL/min/1.73      Globulin 4.1 (H) gm/dL      A/G Ratio 0.8  (L) g/dL      BUN/Creatinine Ratio 18.5     Anion Gap 13.0 mmol/L     Narrative:       The MDRD GFR formula is only valid for adults with stable renal function between ages 18 and 70.    CK-MB [[id number]]  (Normal) Collected:  08/13/17 1007    Specimen:  Blood Updated:  08/13/17 1055     CKMB 0.84 ng/mL     Troponin [[id number]]  (Normal) Collected:  08/13/17 1007    Specimen:  Blood Updated:  08/13/17 1055     Troponin I 0.027 ng/mL     Protime-INR [[id number]]  (Normal) Collected:  08/13/17 1055    Specimen:  Blood Updated:  08/13/17 1122     Protime 14.0 Seconds      INR 1.09    Narrative:       Therapeutic range for most indications is 2.0-3.0 INR,  or 2.5-3.5 for mechanical heart valves.    aPTT [[id number]]  (Abnormal) Collected:  08/13/17 1055    Specimen:  Blood Updated:  08/13/17 1122     PTT 42.1 (H) seconds     Narrative:       The recommended Heparin therapeutic range is 68-97 seconds.    Lavender Top [[id number]] Collected:  08/13/17 1055    Specimen:  Blood Updated:  08/13/17 1201     Extra Tube hold for add-on      Auto resulted       Gold Top - SST [[id number]] Collected:  08/13/17 1055    Specimen:  Blood Updated:  08/13/17 1201     Extra Tube Hold for add-ons.      Auto resulted.       Green Top (Gel) [[id number]] Collected:  08/13/17 1055    Specimen:  Blood Updated:  08/13/17 1201     Extra Tube Hold for add-ons.      Auto resulted.       Osmolality, Serum [[id number]]  (Normal) Collected:  08/13/17 1055    Specimen:  Blood Updated:  08/13/17 1252     Osmolality 286 mOsm/kg     Extra Tubes [[id number]] Collected:  08/13/17 1055    Specimen:  Blood from Blood, Venous Line Updated:  08/13/17 1428    Narrative:       The following orders were created for panel order Extra Tubes.  Procedure                               Abnormality         Status                     ---------                               -----------         ------                     Light Blue Top[[id number]]                                                               Lavender Top[[id number]]                                     Final result               Gold Top - SST[[id number]]                                   Final result               Green Top (Gel)[[id number]]                                  Final result                 Please view results for these tests on the individual orders.    CBC Auto Differential [[id number]]  (Abnormal) Collected:  08/13/17 1055    Specimen:  Blood Updated:  08/13/17 1447     WBC 10.70 (H) 10*3/mm3      RBC 4.77 10*6/mm3      Hemoglobin 12.5 (L) g/dL      Hematocrit 38.0 (L) %      MCV 79.7 (L) fL      MCH 26.2 (L) pg      MCHC 32.9 g/dL      RDW 17.3 (H) %      RDW-SD 50.2 (H) fl      MPV 11.7 fL      Platelets 164 10*3/mm3      Neutrophil % 81.0 (H) %      Lymphocyte % 6.6 (L) %      Monocyte % 10.1 %      Eosinophil % 2.0 %      Basophil % 0.2 %      Immature Grans % 0.1 %      Neutrophils, Absolute 8.67 (H) 10*3/mm3      Lymphocytes, Absolute 0.71 10*3/mm3      Monocytes, Absolute 1.08 (H) 10*3/mm3      Eosinophils, Absolute 0.21 10*3/mm3      Basophils, Absolute 0.02 10*3/mm3      Immature Grans, Absolute 0.01 10*3/mm3      nRBC 0.0 /100 WBC     Blood Gas, Arterial [[id number]]  (Abnormal) Collected:  08/13/17 1449    Specimen:  Arterial Blood Updated:  08/13/17 1524     Site --      Not performed at this site.        Ananth's Test --      Not performed at this site.        pH, Arterial 7.417 pH units      pCO2, Arterial 39.4 mm Hg      pO2, Arterial 60.6 (L) mm Hg      HCO3, Arterial 24.8 mmol/L      Base Excess, Arterial 0.4 mmol/L      O2 Saturation, Arterial 91.1 %      Hemoglobin, Blood Gas 12.4 (L) g/dL      Hematocrit, Blood Gas 36.0 (L) %      CO2 Content 26.0     Sodium, Arterial 122.7 (L) mmol/L      Potassium, Arterial 4.77 mmol/L      Glucose, Arterial 138 mmol/L      Barometric Pressure for Blood Gas -- mmHg       Not performed at this site.        Modality --      Not performed at this site.         Ionized Calcium 4.6 mg/dL     Basic Metabolic Panel [[id number]]  (Abnormal) Collected:  08/13/17 1511    Specimen:  Blood Updated:  08/13/17 1609     Glucose 136 (H) mg/dL      BUN 72 (H) mg/dL      Creatinine 3.78 (H) mg/dL      Sodium 118 (C) mmol/L      Potassium 4.6 mmol/L      Chloride 89 (L) mmol/L      CO2 19.0 (L) mmol/L      Calcium 8.7 mg/dL      eGFR Non African Amer 16 (L) mL/min/1.73      BUN/Creatinine Ratio 19.0     Anion Gap 10.0 mmol/L     Narrative:       The MDRD GFR formula is only valid for adults with stable renal function between ages 18 and 70.    Scan Slide [[id number]] Collected:  08/13/17 1055    Specimen:  Blood Updated:  08/13/17 1638     Anisocytosis Slight/1+     Hypochromia Slight/1+     Microcytes Slight/1+     WBC Morphology Normal     Platelet Estimate Adequate     Large Platelets Slight/1+    CBC & Differential [[id number]] Collected:  08/13/17 1055    Specimen:  Blood Updated:  08/13/17 1638    Narrative:       The following orders were created for panel order CBC & Differential.  Procedure                               Abnormality         Status                     ---------                               -----------         ------                     Scan Slide[[id number]]                                       Final result               CBC Auto Differential[[id number]]        Abnormal            Final result                 Please view results for these tests on the individual orders.    CRE Screen by PCR [[id number]] Collected:  08/13/17 1418    Specimen:  Swab from Large Intestine, Rectum Updated:  08/13/17 1700     CRE SCREEN Not Detected      This test is performed by utilizing real time polymerace chain recation (PCR).         OXA 48 Strain Not Detected      Not Applicable.        IMP STRAIN Not Detected      Not Applicable        VIM STRAING Not Detected      Not Applicable        NDM Strain Not Detected      Not Applicable        KPC Strain Not Detected      Not Applicable        Sodium, Urine, Random [[id number]]  (Normal) Collected:  08/13/17 1814    Specimen:  Urine Updated:  08/13/17 2121     Sodium, Urine 52 mmol/L     Creatinine, Urine, Random [[id number]] Collected:  08/13/17 1814    Specimen:  Urine Updated:  08/13/17 2121     Creatinine, Urine 40.7 mg/dL     Urinalysis With / Culture If Indicated [[id number]]  (Abnormal) Collected:  08/13/17 1814    Specimen:  Urine from Urine, Clean Catch Updated:  08/13/17 2123     Color, UA Red (A)     Appearance, UA Turbid (A)     pH, UA 6.0     Specific Gravity, UA 1.011     Glucose, UA Negative     Ketones, UA 15 mg/dL (1+) (A)     Bilirubin, UA Moderate (2+) (A)     Blood, UA Large (3+) (A)     Protein,  mg/dL (2+) (A)     Leuk Esterase, UA Large (3+) (A)     Nitrite, UA Negative     Urobilinogen, UA 1.0 E.U./dL    Urinalysis, Microscopic Only [[id number]]  (Abnormal) Collected:  08/13/17 1814    Specimen:  Urine from Urine, Clean Catch Updated:  08/13/17 2127     RBC, UA Too Numerous to Count (A) /HPF      WBC, UA Too Numerous to Count (A) /HPF      Bacteria, UA 3+ (A) /HPF      Squamous Epithelial Cells, UA 0-2 /HPF      Transitional Epithelial Cells, UA 0-2 /HPF      Hyaline Casts, UA None Seen /LPF      Methodology Manual Light Microscopy    Osmolality, Urine [[id number]]  (Normal) Collected:  08/13/17 1814    Specimen:  Urine Updated:  08/13/17 2141     Osmolality, Urine 265 mOsm/kg     Blood Culture [[id number]]  (Normal) Collected:  08/13/17 1055    Specimen:  Blood from Arm, Left Updated:  08/14/17 0001     Blood Culture No growth at less than 24 hours    Basic Metabolic Panel [[id number]]  (Abnormal) Collected:  08/13/17 2352    Specimen:  Blood Updated:  08/14/17 0025     Glucose 96 mg/dL      BUN 73 (H) mg/dL      Creatinine 3.52 (H) mg/dL      Sodium 125 (L) mmol/L      Potassium 5.1 mmol/L      Chloride 92 (L) mmol/L      CO2 23.0 mmol/L      Calcium 8.7 mg/dL      eGFR Non African Amer 17 (L) mL/min/1.73      BUN/Creatinine  Ratio 20.7     Anion Gap 10.0 mmol/L     Narrative:       The MDRD GFR formula is only valid for adults with stable renal function between ages 18 and 70.    Blood Culture [[id number]]  (Normal) Collected:  08/13/17 1007    Specimen:  Blood from Arm, Right Updated:  08/14/17 0101     Blood Culture No growth at less than 24 hours    Urine Culture [[id number]]  (Normal) Collected:  08/13/17 1809    Specimen:  Urine from Urine, Catheter Updated:  08/14/17 0604     Urine Culture Culture in progress    CBC & Differential [[id number]] Collected:  08/14/17 0612    Specimen:  Blood Updated:  08/14/17 0634    Narrative:       The following orders were created for panel order CBC & Differential.  Procedure                               Abnormality         Status                     ---------                               -----------         ------                     CBC Auto Differential[[id number]]        Abnormal            Final result                 Please view results for these tests on the individual orders.    CBC Auto Differential [[id number]]  (Abnormal) Collected:  08/14/17 0612    Specimen:  Blood Updated:  08/14/17 0634     WBC 11.93 (H) 10*3/mm3      RBC 4.61 10*6/mm3      Hemoglobin 12.0 (L) g/dL      Hematocrit 36.8 (L) %      MCV 79.8 (L) fL      MCH 26.0 (L) pg      MCHC 32.6 g/dL      RDW 17.3 (H) %      RDW-SD 50.3 (H) fl      MPV 10.8 fL      Platelets 161 10*3/mm3      Neutrophil % 88.9 (H) %      Lymphocyte % 4.9 (L) %      Monocyte % 5.6 %      Eosinophil % 0.1 %      Basophil % 0.1 %      Immature Grans % 0.4 %      Neutrophils, Absolute 10.60 (H) 10*3/mm3      Lymphocytes, Absolute 0.59 (L) 10*3/mm3      Monocytes, Absolute 0.67 10*3/mm3      Eosinophils, Absolute 0.01 10*3/mm3      Basophils, Absolute 0.01 10*3/mm3      Immature Grans, Absolute 0.05 (H) 10*3/mm3     Basic Metabolic Panel [[id number]]  (Abnormal) Collected:  08/14/17 0612    Specimen:  Blood Updated:  08/14/17 0653     Glucose 97 mg/dL       BUN 68 (H) mg/dL      Creatinine 3.75 (H) mg/dL      Sodium 132 (L) mmol/L      Potassium 4.7 mmol/L      Chloride 96 mmol/L      CO2 25.0 mmol/L      Calcium 9.4 mg/dL      eGFR Non African Amer 16 (L) mL/min/1.73      BUN/Creatinine Ratio 18.1     Anion Gap 11.0 mmol/L     Narrative:       The MDRD GFR formula is only valid for adults with stable renal function between ages 18 and 70.         Imaging Results (last 24 hours)     Procedure Component Value Units Date/Time    XR Chest 1 View [[id number]] Collected:  08/13/17 0947     Updated:  08/13/17 1011    Narrative:         PROCEDURE: Single chest view AP    REASON FOR EXAM:ams    FINDINGS: Comparison exam dated October 30, 2016. Cardiac and  pulmonary vasculature are normal. Left lung base small  retrocardiac patchy opacity. Left upper lobe lingula small linear  opacity. Lungs otherwise clear. Pleural spaces are normal. No  acute osseous abnormality.      Impression:       1.  Left lung base small retrocardiac patchy opacities suspicious  for early pneumonia versus subsegmental atelectasis.  2.  Left upper lobe lingula small focus of discoid atelectasis.    Electronically signed by:  Star iJmenez MD  8/13/2017 10:10 AM CDT  Workstation: [id number]    US Venous Doppler Lower Extremity Left (duplex) [[id number]] Collected:  08/13/17 1010     Updated:  08/13/17 1049    Narrative:       Procedure: Left lower extremity venous ultrasound      CLINICAL INDICATION: swelling/redness      COMPARISON: None    FINDINGS:    The common femoral,  femoral,  popliteal and calf veins of the  left  lower extremity are well identified and compress normally.   Doppler signals are heard either spontaneously or on distal  compression.  No evidence of intraluminal thrombus was noted.         Impression:       CONCLUSION:  No evidence of deep venous thrombosis in the common  femoral, femoral, popliteal and calf veins of the left  lower  extremity.    Electronically signed by:  Star Jimenez  MD  8/13/2017 10:48 AM CDT  Workstation: [id number]    XR Knee 3 View Left [[id number]] Collected:  08/13/17 1235     Updated:  08/13/17 1307    Narrative:       PROCEDURE: XR KNEE 3 VW LEFT    VIEWS: 3    INDICATION: Cellulitis    COMPARISON: None    FINDINGS:     -No fracture identified. Diffusely decreased osseous  mineralization is present. Tricompartmental degenerative changes  are present. There is medial greater than lateral joint space  narrowing. There is heterotopic calcification or calcified  structures within the joint capsule in the suprapatellar space.  Subcutaneous stranding is seen at and superior to the level of  the knee, medial greater than lateral.      Impression:       CONCLUSION: Subcutaneous soft tissue stranding, probably  representing patient's reported cellulitis. No acute osseous  abnormality.    Note:  if pain or symptoms persist beyond reasonable expectations    and follow-up imaging is anticipated,  cross sectional imaging   (CT and/or MRI) is suggested, as is deemed clinically   appropriate.    Electronically signed by:  Michelle Earl MD  8/13/2017 1:06 PM CDT  Workstation: [id number]    US Renal Complete [[id number]] Collected:  08/13/17 1256     Updated:  08/13/17 1344    Narrative:         EXAMINATION:  ultrasound, retroperitoneal / renal       CLINICAL INDICATION:  Acute renal failure       HISTORY:  COMPARISON: Renal ultrasound  8/9/14 , CT A/P 4/23/17     TECHNIQUE:  ultrasound, renal, standard protocol            FINDINGS:             Kidney, right:         size:  normal      echotexture:  Mildly hyperechoic, progressed            misc.:  Minimal/mild collecting system dilatation      Kidney, left:         size:  normal      echotexture:  Symmetric with the right          misc.:  Markedly dilated central collecting system, overall  configuration unchanged from the prior CT       Urinary bladder:  grossly within normal limits             Vascular (visualized portions of - remainder  obscured by  overlying bowel gas):           Aorta:  normal caliber         IVC:  normal caliber, no intraluminal defect(s)        Misc:      .      Impression:       CONCLUSION:    1.  Markedly dilated left renal collecting system, overall  configuration unchanged from the prior CT.  Suggest retrograde urethrogram.  2. Newly appreciated mild right renal collecting system  dilatation.   3. Images provided of the bladder grossly unremarkable.  4. Renal parenchymal echotexture greater than that of the liver  implying the presence of chronic medical renal disease.    Electronically signed by:  CURRY Lawler MD  8/13/2017 1:43  PM CDT Workstation: [id number]           I reviewed the patient's new clinical results.  I reviewed the patient's new imaging results and agree with the interpretation.  I reviewed the patient's other test results and agree with the interpretation      Assessment/Plan     Active Problems:    Pneumonia of both lungs due to infectious organism      Assessment:  (POD #1 cystoscopy with catheter placement in OR, patient had UTI, urinary retention, severe bladder neck contracture, urine culture from OR is in progress, he is on Azactam #2 and Levaquin dose #2 due tomorrow for pneumonia.  ELOISE, creatinine baseline 1.0, today it is 3.75,  8/13/17 renal ultrasound showed hydronephrosis left worse than right, left appears chronic.     Looking through old records during chart review, history of kidney and ureteric stones, history of bladder outlet obstruction, hydronephrosis left, history of urethral trauma in Sept. of 2015 after Casanova balloon was inflated in urethra.).     Plan:   (Await urine culture results, continue antibiotics, chronic Casanova. Follow renal function.).       WENDY Oliver  08/14/17  9:32 AM

## 2017-08-14 NOTE — PLAN OF CARE
Problem: Patient Care Overview (Adult)  Goal: Plan of Care Review  Outcome: Ongoing (interventions implemented as appropriate)    08/14/17 0459   Coping/Psychosocial Response Interventions   Plan Of Care Reviewed With patient   Patient Care Overview   Progress no change   Outcome Evaluation   Outcome Summary/Follow up Plan pt had excessive urine output during the night, Dr. Kramer aware; urine becoming more clear with less sediment       Goal: Adult Individualization and Mutuality  Outcome: Ongoing (interventions implemented as appropriate)  Goal: Discharge Needs Assessment  Outcome: Ongoing (interventions implemented as appropriate)    Problem: Pneumonia (Adult)  Goal: Signs and Symptoms of Listed Potential Problems Will be Absent or Manageable (Pneumonia)  Outcome: Ongoing (interventions implemented as appropriate)    Problem: Skin Integrity Impairment, Risk/Actual (Adult)  Goal: Identify Related Risk Factors and Signs and Symptoms  Outcome: Outcome(s) achieved Date Met:  08/14/17  Goal: Skin Integrity/Wound Healing  Outcome: Ongoing (interventions implemented as appropriate)    Problem: Fall Risk (Adult)  Goal: Identify Related Risk Factors and Signs and Symptoms  Outcome: Outcome(s) achieved Date Met:  08/14/17  Goal: Absence of Falls  Outcome: Ongoing (interventions implemented as appropriate)

## 2017-08-14 NOTE — PROGRESS NOTES
[facility] Medicine Services  INPATIENT PROGRESS NOTE    Length of Stay: 1  Date of Admission: 8/13/2017  Primary Care Physician: Mingo Rodriguez MD    Subjective     Chief Complaint   Patient presents with   • Shortness of Breath   • Leg Swelling     HPI:  77 year old male presented with complain of left lower ext swelling and pain. Chest xray also show pneumonia. Pt also had chronic you Which was discontinued at the nursing home.  Patient is status post new prominent You placement by Dr. Crawford.  Patient is also found to have acute cystitis as well his been started on antibiotic.      Patient appeared to be drowsy difficulty with obtaining any history.    Review of Systems   Cardiovascular: Positive for leg swelling (leg pain ). Negative for chest pain and palpitations.   Gastrointestinal: Negative for abdominal pain.   Psychiatric/Behavioral: Negative for agitation and behavioral problems.        All pertinent negatives and positives are as above. All other systems have been reviewed and are negative unless otherwise stated.     Objective      Vital Sign Min/Max for last 24 hours  Temp  Min: 96.7 °F (35.9 °C)  Max: 98.6 °F (37 °C)   BP  Min: 113/66  Max: 160/88   Pulse  Min: 94  Max: 106   Resp  Min: 16  Max: 20   SpO2  Min: 93 %  Max: 98 %   Flow (L/min)  Min: 3  Max: 6   Weight  Min: 254 lb 11.2 oz (116 kg)  Max: 254 lb 11.2 oz (116 kg)         Physical Exam   Constitutional: He appears well-developed.   HENT:   Head: Normocephalic.   Neck: Normal range of motion.   Cardiovascular: Normal rate, regular rhythm and normal heart sounds.    Pulmonary/Chest: He has decreased breath sounds. He has wheezes. He has rales.   Abdominal: Soft. Bowel sounds are normal.   G tube present    Musculoskeletal:        Left knee: He exhibits decreased range of motion, swelling and erythema.        Legs:  Neurological:   Sleepy but arousable   Skin: Skin is warm.   Vitals  reviewed.      Current Facility-Administered Medications   Medication Dose Route Frequency Provider Last Rate Last Dose   • acetaminophen (TYLENOL) tablet 650 mg  650 mg Oral Once PRN Munir Laurent MD        Or   • acetaminophen (TYLENOL) suppository 650 mg  650 mg Rectal Once PRN Munir Laurent MD       • albuterol (PROVENTIL HFA;VENTOLIN HFA) inhaler 2 puff  2 puff Inhalation Q4H PRN Jatin Boone MD       • albuterol (PROVENTIL) nebulizer solution 0.5% 2.5 mg/0.5mL  2.5 mg Nebulization Q6H PRN Jatin Boone MD       • [START ON 8/15/2017] ALPRAZolam (XANAX) tablet 0.25 mg  0.25 mg Per G Tube Daily Jose Phillips MD       • [START ON 8/15/2017] ALPRAZolam (XANAX) tablet 0.5 mg  0.5 mg Per G Tube Daily Jose Phillips MD       • [START ON 8/15/2017] amLODIPine (NORVASC) tablet 10 mg  10 mg Per G Tube Daily Jose Phillips MD       • aztreonam (AZACTAM) 1 g/100 mL 0.9% NS IVPB (mbp)  1 g Intravenous Q8H Omari Rivero MD   1 g at 08/14/17 1039   • CloNIDine (CATAPRES-TTS) 0.2 MG/24HR patch 1 patch  1 patch Transdermal Weekly Jatin Boone MD   1 patch at 08/13/17 2006   • diphenhydrAMINE (BENADRYL) injection 12.5 mg  12.5 mg Intravenous Q15 Min PRN Munir Laurent MD       • ePHEDrine injection 5 mg  5 mg Intravenous Once PRN Munir Laurent MD       • [START ON 8/15/2017] famotidine (PEPCID) tablet 40 mg  40 mg Per G Tube Daily Jose Phillips MD       • flumazenil (ROMAZICON) injection 0.2 mg  0.2 mg Intravenous PRN Munir Laurent MD       • FLUoxetine (PROzac) capsule 40 mg  40 mg Per G Tube BID Jose Phillips MD       • heparin (porcine) 5000 UNIT/ML injection 5,000 Units  5,000 Units Subcutaneous Q8H Jose Phillips MD       • HYDROcodone-acetaminophen (NORCO)  MG per tablet 1 tablet  1 tablet Oral Q4H PRN Jatin Boone MD       • HYDROmorphone (DILAUDID) injection 0.5 mg  0.5 mg Intravenous Q5 Min PRN Munir Laurent MD       • ipratropium-albuterol (DUO-NEB) nebulizer solution 3 mL  3 mL  Nebulization 4x Daily - RT Jatin Boone MD   3 mL at 08/14/17 1050   • labetalol (NORMODYNE,TRANDATE) injection 5 mg  5 mg Intravenous Q5 Min PRN Munir Laurent MD       • levoFLOXacin (LEVAQUIN) 500 mg/100 mL D5W (premix) (LEVAQUIN) 500 mg  500 mg Intravenous Q48H Omari Rivero MD   500 mg at 08/13/17 1154   • lidocaine (XYLOCAINE) 2 % jelly    PRN Christian Crawford MD   10 mL at 08/13/17 1823   • Morphine sulfate (PF) injection 0.5 mg  0.5 mg Intravenous Q4H PRN Jose Phillips MD        And   • naloxone (NARCAN) injection 0.4 mg  0.4 mg Intravenous Q5 Min PRN Jose Phillips MD       • nortriptyline (PAMELOR) capsule 50 mg  50 mg Per G Tube Nightly Jose Phillips MD       • ondansetron (ZOFRAN) injection 4 mg  4 mg Intravenous Once PRN Munir Laurent MD       • Pharmacy to dose vancomycin   Does not apply Continuous PRN Jatin Boone MD       • [START ON 8/15/2017] polyethylene glycol (MIRALAX) powder 17 g  17 g Per G Tube Daily Jose Phillips MD       • promethazine (PHENERGAN) tablet 12.5 mg  12.5 mg Oral Q6H PRN Jatin Boone MD        Or   • promethazine (PHENERGAN) injection 12.5 mg  12.5 mg Intramuscular Q6H PRN Jatin Bonoe MD        Or   • promethazine (PHENERGAN) suppository 12.5 mg  12.5 mg Rectal Q6H PRN Jatin Boone MD       • sodium chloride 0.9 % bolus 1,000 mL  1,000 mL Intravenous Once Omari Rivero MD       • sodium chloride 0.9 % flush 1-10 mL  1-10 mL Intravenous PRN Jatin Boone MD       • tamsulosin (FLOMAX) 24 hr capsule 0.4 mg  0.4 mg Oral Daily Jatin Boone MD   0.4 mg at 08/14/17 0857   • [START ON 8/15/2017] vancomycin (VANCOCIN) 1,500 mg in sodium chloride 0.9 % 500 mL IVPB  1,500 mg Intravenous Q48H Jatin Boone MD               Results Review:  I have reviewed the labs, radiology results, and diagnostic studies.    Laboratory Data:     Results from last 7 days  Lab Units 08/14/17  0612 08/13/17  2352 08/13/17  1511  08/13/17  1007   SODIUM mmol/L 132*  125* 118*  --  120*   SODIUM, ARTERIAL   --   --   --   < >  --    POTASSIUM mmol/L 4.7 5.1 4.6  --  4.8   CHLORIDE mmol/L 96 92* 89*  --  84*   CO2 mmol/L 25.0 23.0 19.0*  --  23.0   BUN mg/dL 68* 73* 72*  --  70*   CREATININE mg/dL 3.75* 3.52* 3.78*  --  3.79*   GLUCOSE mg/dL 97 96 136*  --  116*   GLUCOSE, ARTERIAL   --   --   --   < >  --    CALCIUM mg/dL 9.4 8.7 8.7  --  8.7   BILIRUBIN mg/dL  --   --   --   --  0.8   ALK PHOS U/L  --   --   --   --  251*   ALT (SGPT) U/L  --   --   --   --  41   AST (SGOT) U/L  --   --   --   --  27   ANION GAP mmol/L 11.0 10.0 10.0  --  13.0   < > = values in this interval not displayed.  Estimated Creatinine Clearance: 21.9 mL/min (by C-G formula based on Cr of 3.75).            Results from last 7 days  Lab Units 08/14/17  0612 08/13/17  1055 08/13/17  1007   WBC 10*3/mm3 11.93* 10.70* 10.47*   HEMOGLOBIN g/dL 12.0* 12.5* 11.9*   HEMATOCRIT % 36.8* 38.0* 36.4*   PLATELETS 10*3/mm3 161 164 152       Results from last 7 days  Lab Units 08/13/17  1055   INR  1.09       Culture Data:   Blood Culture   Date Value Ref Range Status   08/13/2017 No growth at 24 hours  Preliminary   08/13/2017 No growth at 24 hours  Preliminary     Urine Culture   Date Value Ref Range Status   08/13/2017 Culture in progress  Preliminary     No results found for: RESPCX  No results found for: WOUNDCX  No results found for: STOOLCX  No components found for: BODYFLD         Blood Culture   Date Value Ref Range Status   08/13/2017 No growth at 24 hours  Preliminary   08/13/2017 No growth at less than 24 hours  Preliminary            Urine Culture   Date Value Ref Range Status   08/13/2017 Culture in progress  Preliminary   08/08/2017 Mixed Culture  Final      Radiology Data:   Imaging Results (last 24 hours)     Procedure Component Value Units Date/Time    US Renal Complete [[id number]] Collected:  08/13/17 1256     Updated:  08/13/17 1344    Narrative:         EXAMINATION:  ultrasound, retroperitoneal /  renal       CLINICAL INDICATION:  Acute renal failure       HISTORY:  COMPARISON: Renal ultrasound  8/9/14 , CT A/P 4/23/17     TECHNIQUE:  ultrasound, renal, standard protocol            FINDINGS:             Kidney, right:         size:  normal      echotexture:  Mildly hyperechoic, progressed            misc.:  Minimal/mild collecting system dilatation      Kidney, left:         size:  normal      echotexture:  Symmetric with the right          misc.:  Markedly dilated central collecting system, overall  configuration unchanged from the prior CT       Urinary bladder:  grossly within normal limits             Vascular (visualized portions of - remainder obscured by  overlying bowel gas):           Aorta:  normal caliber         IVC:  normal caliber, no intraluminal defect(s)        Misc:      .      Impression:       CONCLUSION:    1.  Markedly dilated left renal collecting system, overall  configuration unchanged from the prior CT.  Suggest retrograde urethrogram.  2. Newly appreciated mild right renal collecting system  dilatation.   3. Images provided of the bladder grossly unremarkable.  4. Renal parenchymal echotexture greater than that of the liver  implying the presence of chronic medical renal disease.    Electronically signed by:  CURRY Lawler MD  8/13/2017 1:43  PM CDT Workstation: [id number]          I have reviewed the patient current medications.     Assessment/Plan     Active Hospital Problems (** Indicates Principal Problem)    Diagnosis Date Noted   • Acute cystitis [N30.00] 08/14/2017     S/p permanent you placement by Dr. Crawford.   Cultures pending   Pt is on antibiotics.        • Acute kidney injury [N17.9] 08/14/2017     ELOISE cr is stable at 3.75. Dr. Reynaga is on board.        • Cellulitis of left knee [L03.116] 08/14/2017     Pt is taking levaquin for pneumonia it will also cover for cellulitis.   Will monitor if does not improve will change the antibiotics.        • Hyponatremia  [E87.1] 08/14/2017     Improved to 132.   Dr. Reynaga on board.      • Pneumonia of both lungs due to infectious organism [J18.9] 08/13/2017     Day two of levaquin   Neb treatment.           Resolved Hospital Problems    Diagnosis Date Noted Date Resolved   No resolved problems to display.         Discharge Planning: I expect patient to be discharged to nursing home in 2-3 days.      DVT Prophylaxis: heparin               This document has been electronically signed by Jose Phillips MD on August 14, 2017 1:20 PM

## 2017-08-14 NOTE — PLAN OF CARE
Problem: Patient Care Overview (Adult)  Goal: Plan of Care Review  Outcome: Ongoing (interventions implemented as appropriate)    08/14/17 2728   Coping/Psychosocial Response Interventions   Plan Of Care Reviewed With patient   Outcome Evaluation   Outcome Summary/Follow up Plan pt has been very drowsy, left leg very red and painful to touch, US of LLE today       Goal: Adult Individualization and Mutuality  Outcome: Ongoing (interventions implemented as appropriate)  Goal: Discharge Needs Assessment  Outcome: Ongoing (interventions implemented as appropriate)    Problem: Pneumonia (Adult)  Goal: Signs and Symptoms of Listed Potential Problems Will be Absent or Manageable (Pneumonia)  Outcome: Ongoing (interventions implemented as appropriate)    Problem: Skin Integrity Impairment, Risk/Actual (Adult)  Goal: Skin Integrity/Wound Healing  Outcome: Ongoing (interventions implemented as appropriate)    Problem: Fall Risk (Adult)  Goal: Absence of Falls  Outcome: Ongoing (interventions implemented as appropriate)

## 2017-08-14 NOTE — PROGRESS NOTES
"[facility]  [address] [place] [address]. [address]  T - [phone]  F - [phone]     Progress Note          PATIENT  DEMOGRAPHICS   PATIENT NAME: Rex Anthony                      PHYSICIAN: Irma Reynaga MD  : 1940  MRN: [id number]   LOS: 1 day    Patient Care Team:  Mingo Rodriguez MD as PCP - General  Subjective   SUBJECTIVE   Confused leg pain       Objective   OBJECTIVE   Vital Signs  Temp:  [97.4 °F (36.3 °C)-98.8 °F (37.1 °C)] 97.4 °F (36.3 °C)  Heart Rate:  [] 94  Resp:  [16-20] 16  BP: (113-164)/(70-88) 113/70    Flowsheet Rows         First Filed Value    Admission Height  75\" (190.5 cm) Documented at 2017 0925    Admission Weight  243 lb 12.8 oz (111 kg) Documented at 2017 0925           I/O last 3 completed shifts:  In: 50 [Other:50]  Out: 8250 [Urine:8250]    PHYSICAL EXAM    Physical Exam   Constitutional: He appears well-developed.   HENT:   Head: Normocephalic.   Eyes: Pupils are equal, round, and reactive to light.   Cardiovascular: Normal rate, regular rhythm and normal heart sounds.    Pulmonary/Chest: Effort normal. He has wheezes.   Abdominal: Soft. Bowel sounds are normal.   Musculoskeletal: He exhibits edema.   Right AKA   Neurological: He is alert.       RESULTS   Results Review:      Results from last 7 days  Lab Units 17  0612 17  2352 17  1511  17  1007   SODIUM mmol/L 132* 125* 118*  --  120*   SODIUM, ARTERIAL   --   --   --   < >  --    POTASSIUM mmol/L 4.7 5.1 4.6  --  4.8   CHLORIDE mmol/L 96 92* 89*  --  84*   CO2 mmol/L 25.0 23.0 19.0*  --  23.0   BUN mg/dL 68* 73* 72*  --  70*   CREATININE mg/dL 3.75* 3.52* 3.78*  --  3.79*   CALCIUM mg/dL 9.4 8.7 8.7  --  8.7   BILIRUBIN mg/dL  --   --   --   --  0.8   ALK PHOS U/L  --   --   --   --  251*   ALT (SGPT) U/L  --   --   --   --  41   AST (SGOT) U/L  --   --   --   --  27   GLUCOSE mg/dL 97 96 136*  --  116*   GLUCOSE, ARTERIAL   --   --   --   " < >  --    < > = values in this interval not displayed.    Estimated Creatinine Clearance: 21.9 mL/min (by C-G formula based on Cr of 3.75).                  Results from last 7 days  Lab Units 08/14/17  0612 08/13/17  1055 08/13/17  1007   WBC 10*3/mm3 11.93* 10.70* 10.47*   HEMOGLOBIN g/dL 12.0* 12.5* 11.9*   PLATELETS 10*3/mm3 161 164 152         Results from last 7 days  Lab Units 08/13/17  1055   INR  1.09         Imaging Results (last 24 hours)     Procedure Component Value Units Date/Time    XR Chest 1 View [[id number]] Collected:  08/13/17 0947     Updated:  08/13/17 1011    Narrative:         PROCEDURE: Single chest view AP    REASON FOR EXAM:ams    FINDINGS: Comparison exam dated October 30, 2016. Cardiac and  pulmonary vasculature are normal. Left lung base small  retrocardiac patchy opacity. Left upper lobe lingula small linear  opacity. Lungs otherwise clear. Pleural spaces are normal. No  acute osseous abnormality.      Impression:       1.  Left lung base small retrocardiac patchy opacities suspicious  for early pneumonia versus subsegmental atelectasis.  2.  Left upper lobe lingula small focus of discoid atelectasis.    Electronically signed by:  Star Jimenez MD  8/13/2017 10:10 AM CDT  Workstation: [id number]    US Venous Doppler Lower Extremity Left (duplex) [[id number]] Collected:  08/13/17 1010     Updated:  08/13/17 1049    Narrative:       Procedure: Left lower extremity venous ultrasound      CLINICAL INDICATION: swelling/redness      COMPARISON: None    FINDINGS:    The common femoral,  femoral,  popliteal and calf veins of the  left  lower extremity are well identified and compress normally.   Doppler signals are heard either spontaneously or on distal  compression.  No evidence of intraluminal thrombus was noted.         Impression:       CONCLUSION:  No evidence of deep venous thrombosis in the common  femoral, femoral, popliteal and calf veins of the left  lower  extremity.    Electronically  signed by:  Star Jimenez MD  8/13/2017 10:48 AM CDT  Workstation: [id number]    XR Knee 3 View Left [[id number]] Collected:  08/13/17 1235     Updated:  08/13/17 1307    Narrative:       PROCEDURE: XR KNEE 3 VW LEFT    VIEWS: 3    INDICATION: Cellulitis    COMPARISON: None    FINDINGS:     -No fracture identified. Diffusely decreased osseous  mineralization is present. Tricompartmental degenerative changes  are present. There is medial greater than lateral joint space  narrowing. There is heterotopic calcification or calcified  structures within the joint capsule in the suprapatellar space.  Subcutaneous stranding is seen at and superior to the level of  the knee, medial greater than lateral.      Impression:       CONCLUSION: Subcutaneous soft tissue stranding, probably  representing patient's reported cellulitis. No acute osseous  abnormality.    Note:  if pain or symptoms persist beyond reasonable expectations    and follow-up imaging is anticipated,  cross sectional imaging   (CT and/or MRI) is suggested, as is deemed clinically   appropriate.    Electronically signed by:  Michelle Earl MD  8/13/2017 1:06 PM CDT  Workstation: [id number]    US Renal Complete [[id number]] Collected:  08/13/17 1256     Updated:  08/13/17 1344    Narrative:         EXAMINATION:  ultrasound, retroperitoneal / renal       CLINICAL INDICATION:  Acute renal failure       HISTORY:  COMPARISON: Renal ultrasound  8/9/14 , CT A/P 4/23/17     TECHNIQUE:  ultrasound, renal, standard protocol            FINDINGS:             Kidney, right:         size:  normal      echotexture:  Mildly hyperechoic, progressed            misc.:  Minimal/mild collecting system dilatation      Kidney, left:         size:  normal      echotexture:  Symmetric with the right          misc.:  Markedly dilated central collecting system, overall  configuration unchanged from the prior CT       Urinary bladder:  grossly within normal limits             Vascular (visualized  portions of - remainder obscured by  overlying bowel gas):           Aorta:  normal caliber         IVC:  normal caliber, no intraluminal defect(s)        Misc:      .      Impression:       CONCLUSION:    1.  Markedly dilated left renal collecting system, overall  configuration unchanged from the prior CT.  Suggest retrograde urethrogram.  2. Newly appreciated mild right renal collecting system  dilatation.   3. Images provided of the bladder grossly unremarkable.  4. Renal parenchymal echotexture greater than that of the liver  implying the presence of chronic medical renal disease.    Electronically signed by:  CURRY Lawler MD  8/13/2017 1:43  PM CDT Workstation: [id number]           MEDICATIONS      [START ON 8/15/2017] ALPRAZolam 0.25 mg Per G Tube Daily   [START ON 8/15/2017] ALPRAZolam 0.5 mg Per G Tube Daily   [START ON 8/15/2017] amLODIPine 10 mg Per G Tube Daily   aztreonam 1 g Intravenous Q8H   CloNIDine 1 patch Transdermal Weekly   [START ON 8/15/2017] famotidine 40 mg Per G Tube Daily   FLUoxetine 40 mg Per G Tube BID   ipratropium-albuterol 3 mL Nebulization 4x Daily - RT   levoFLOXacin 500 mg Intravenous Q48H   nortriptyline 50 mg Per G Tube Nightly   [START ON 8/15/2017] polyethylene glycol 17 g Per G Tube Daily   sodium chloride 1,000 mL Intravenous Once   tamsulosin 0.4 mg Oral Daily       Pharmacy to dose vancomycin        Assessment/Plan   ASSESSMENT / PLAN    Active Problems:    Pneumonia of both lungs due to infectious organism    1. Acute renal failure: Baseline creatinine is about 1.0mg/dl  - Etiology is most likely pre-renal and post renal. S/p you. H/o SPC and neurogenic bladder . Monitor renal indices GFR remains low may need RRT dw family     2. Hyponatremia:   - Most likely hypovolemic hyponatremia  - corrected rapidly s/p d5w and ddavp. dw family about na correction. Check na at noon.      3. Pneumonia:  - On antibiotics. Dose antibiotics for renal function     4.  Hypertension:  - Continue home blood pressure medications     5. Anemia     6. h/o urinary retention & hydronephrosis              This document has been electronically signed by Irma Reynaga MD on August 14, 2017 9:50 AM
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
## 2017-08-15 NOTE — PLAN OF CARE
Problem: Patient Care Overview (Adult)  Goal: Plan of Care Review  Outcome: Ongoing (interventions implemented as appropriate)    08/15/17 0412   Coping/Psychosocial Response Interventions   Plan Of Care Reviewed With patient   Patient Care Overview   Progress no change   Outcome Evaluation   Outcome Summary/Follow up Plan Patient VSS, very drowsy through the night, left leg still red/painful.       Goal: Adult Individualization and Mutuality  Outcome: Ongoing (interventions implemented as appropriate)  Goal: Discharge Needs Assessment  Outcome: Ongoing (interventions implemented as appropriate)    Problem: Pneumonia (Adult)  Goal: Signs and Symptoms of Listed Potential Problems Will be Absent or Manageable (Pneumonia)  Outcome: Ongoing (interventions implemented as appropriate)    Problem: Skin Integrity Impairment, Risk/Actual (Adult)  Goal: Skin Integrity/Wound Healing  Outcome: Ongoing (interventions implemented as appropriate)    Problem: Fall Risk (Adult)  Goal: Absence of Falls  Outcome: Ongoing (interventions implemented as appropriate)

## 2017-08-15 NOTE — PROGRESS NOTES
"[facility]  [address] [place] [address]. [address]  T - [phone]  F - [phone]     Progress Note          PATIENT  DEMOGRAPHICS   PATIENT NAME: Rex Anthony                      PHYSICIAN: Irma Reynaga MD  : 1940  MRN: [id number]   LOS: 2 days    Patient Care Team:  Mingo Rodriguez MD as PCP - General  Subjective   SUBJECTIVE   More awake no distress       Objective   OBJECTIVE   Vital Signs  Temp:  [96.5 °F (35.8 °C)-99.2 °F (37.3 °C)] 97.9 °F (36.6 °C)  Heart Rate:  [] 97  Resp:  [18-22] 20  BP: (104-139)/(56-72) 120/72    Flowsheet Rows         First Filed Value    Admission Height  75\" (190.5 cm) Documented at 2017 0925    Admission Weight  243 lb 12.8 oz (111 kg) Documented at 2017 0925           I/O last 3 completed shifts:  In: 1910 [I.V.:900; Other:350; NG/GT:660]  Out: 70440 [Urine:70112]    PHYSICAL EXAM    Physical Exam   Constitutional: He appears well-developed.   HENT:   Head: Normocephalic.   Eyes: Pupils are equal, round, and reactive to light.   Cardiovascular: Normal rate, regular rhythm and normal heart sounds.    Pulmonary/Chest: Effort normal. He has wheezes.   Abdominal: Soft. Bowel sounds are normal.   Musculoskeletal: He exhibits edema.   Right AKA   Neurological: He is alert.       RESULTS   Results Review:      Results from last 7 days  Lab Units 08/15/17  0632 08/15/17  0013 17  1913  17  1007   SODIUM mmol/L 132* 131* 132*  < > 120*   SODIUM, ARTERIAL   --   --   --   < >  --    POTASSIUM mmol/L 3.6 3.9 4.0  < > 4.8   CHLORIDE mmol/L 96 95 95  < > 84*   CO2 mmol/L 26.0 25.0 27.0  < > 23.0   BUN mg/dL 69* 69* 71*  < > 70*   CREATININE mg/dL 2.90* 3.19* 3.42*  < > 3.79*   CALCIUM mg/dL 9.2 9.4 9.3  < > 8.7   BILIRUBIN mg/dL  --   --   --   --  0.8   ALK PHOS U/L  --   --   --   --  251*   ALT (SGPT) U/L  --   --   --   --  41   AST (SGOT) U/L  --   --   --   --  27   GLUCOSE mg/dL 170* 170* 166*  < > 116* "   GLUCOSE, ARTERIAL   --   --   --   < >  --    < > = values in this interval not displayed.    Estimated Creatinine Clearance: 28.4 mL/min (by C-G formula based on Cr of 2.9).                  Results from last 7 days  Lab Units 08/15/17  0632 08/14/17  0612 08/13/17  1055 08/13/17  1007   WBC 10*3/mm3 7.96 11.93* 10.70* 10.47*   HEMOGLOBIN g/dL 11.5* 12.0* 12.5* 11.9*   PLATELETS 10*3/mm3 179 161 164 152         Results from last 7 days  Lab Units 08/13/17  1055   INR  1.09         Imaging Results (last 24 hours)     Procedure Component Value Units Date/Time    US Venous Doppler Lower Extremity Left (duplex) [[id number]] Collected:  08/14/17 1420     Updated:  08/14/17 1719    Narrative:       Patient Name:  MOODY CALVILLO  Patient ID:  [id number]   Ordering:  IJEOMA SPENCE  Attending:  JERAD TRUONG  Referring:  IJEOMA SPENCE  ------------------------------------------------    Ultrasound venous duplex left lower extremity    HISTORY: Left lower extremity and erythema.    Duplex ultrasound of the deep venous system of the left lower  extremity was performed    Real-time images demonstrate normal compressibility without  evidence of intraluminal thrombus.  Doppler shows phasic flow and augmentation.  Color Doppler also reveals venous patency.      Impression:       CONCLUSION:  No ultrasound evidence of deep venous thrombosis of the left  lower extremity.    [id number]    Electronically signed by:  Parth Lewis MD  8/14/2017 5:18 PM CDT  Workstation: [email or web address]           MEDICATIONS      ALPRAZolam 0.25 mg Per G Tube Daily   ALPRAZolam 0.5 mg Per G Tube Daily   amLODIPine 10 mg Per G Tube Daily   aztreonam 1 g Intravenous Q8H   CloNIDine 1 patch Transdermal Weekly   famotidine 40 mg Per G Tube Daily   FLUoxetine 40 mg Per G Tube BID   heparin (porcine) 5,000 Units Subcutaneous Q8H   ipratropium-albuterol 3 mL Nebulization 4x Daily - RT   levoFLOXacin 500 mg Intravenous Q48H   nortriptyline 50 mg Per G Tube  Nightly   polyethylene glycol 17 g Per G Tube Daily   sodium chloride 1,000 mL Intravenous Once   tamsulosin 0.4 mg Oral Daily   vancomycin 1,500 mg Intravenous Q48H       dextrose 125 mL/hr Last Rate: 125 mL/hr (08/15/17 0045)   Pharmacy to dose vancomycin         Assessment/Plan   ASSESSMENT / PLAN    Active Problems:    Pneumonia of both lungs due to infectious organism    Acute cystitis    Acute kidney injury    Cellulitis of left knee    Hyponatremia    1. Acute renal failure: Baseline creatinine is about 1.0mg/dl  - Etiology is most likely pre-renal and post renal. S/p you. H/o SPC and neurogenic bladder . Cr better peak 3.7 and now 2.9.      2. Hyponatremia:   - Most likely hypovolemic hyponatremia  - corrected rapidly s/p d5w and ddavp. Keep d 5w and check na later. .      3. Pneumonia:  - On antibiotics. Dose antibiotics for renal function     4. Hypertension:  - Continue home blood pressure medications     5. Anemia     6. h/o urinary retention & hydronephrosis              This document has been electronically signed by Irma Reynaga MD on August 15, 2017 9:48 AM

## 2017-08-15 NOTE — PLAN OF CARE
Problem: Patient Care Overview (Adult)  Goal: Plan of Care Review  Outcome: Ongoing (interventions implemented as appropriate)    08/15/17 1329   Coping/Psychosocial Response Interventions   Plan Of Care Reviewed With patient   Patient Care Overview   Progress no change   Outcome Evaluation   Outcome Summary/Follow up Plan pt drowsy at times, left leg seems less painful today, monitoring sodium, continuing with bolus feedings        Goal: Adult Individualization and Mutuality  Outcome: Ongoing (interventions implemented as appropriate)  Goal: Discharge Needs Assessment  Outcome: Ongoing (interventions implemented as appropriate)    Problem: Pneumonia (Adult)  Goal: Signs and Symptoms of Listed Potential Problems Will be Absent or Manageable (Pneumonia)  Outcome: Ongoing (interventions implemented as appropriate)    Problem: Skin Integrity Impairment, Risk/Actual (Adult)  Goal: Skin Integrity/Wound Healing  Outcome: Ongoing (interventions implemented as appropriate)    Problem: Fall Risk (Adult)  Goal: Absence of Falls  Outcome: Ongoing (interventions implemented as appropriate)

## 2017-08-16 NOTE — PLAN OF CARE
Problem: Patient Care Overview (Adult)  Goal: Plan of Care Review  Outcome: Ongoing (interventions implemented as appropriate)    08/16/17 0311   Coping/Psychosocial Response Interventions   Plan Of Care Reviewed With patient   Patient Care Overview   Progress no change   Outcome Evaluation   Outcome Summary/Follow up Plan Patient VSS, drowsy, left leg looks to be a little less red, continuing bolus feedings and monitoring sodium.        Goal: Adult Individualization and Mutuality  Outcome: Ongoing (interventions implemented as appropriate)  Goal: Discharge Needs Assessment  Outcome: Ongoing (interventions implemented as appropriate)    Problem: Pneumonia (Adult)  Goal: Signs and Symptoms of Listed Potential Problems Will be Absent or Manageable (Pneumonia)  Outcome: Ongoing (interventions implemented as appropriate)    Problem: Skin Integrity Impairment, Risk/Actual (Adult)  Goal: Skin Integrity/Wound Healing  Outcome: Ongoing (interventions implemented as appropriate)    Problem: Fall Risk (Adult)  Goal: Absence of Falls  Outcome: Ongoing (interventions implemented as appropriate)
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
## 2017-08-16 NOTE — PROGRESS NOTES
[facility]  INPATIENT PROGRESS NOTE    Length of Stay: 3  Date of Admission: 8/13/2017  Primary Care Physician: Mingo Rodriguez MD    Subjective     Chief Complaint   Patient presents with   • Shortness of Breath   • Leg Swelling     HPI:  77 year old male presented with complain of left lower ext swelling and pain. Chest xray also show pneumonia. Pt also had chronic you Which was discontinued at the nursing home.  Patient is status post new prominent You placement by Dr. Crawford.  Patient is also found to have acute cystitis as well his been started on antibiotic.      8/15/2017 patient is being treated with antibiotic.  He appeared to be more alert today.  State that he is feeling little better however he still has a left maxillary swelling and still has mild shortness of air.    8/16/2017: Patient is more awake.  Patient state that he is feeling much better.  He is not requiring oxygen.  His left knee swelling is improved.    Review of Systems   HENT: Negative for rhinorrhea.    Respiratory: Positive for shortness of breath.    Cardiovascular: Positive for leg swelling (leg pain ). Negative for chest pain and palpitations.   Gastrointestinal: Negative for abdominal pain and vomiting.   Genitourinary: Negative for dysuria and urgency.   Neurological: Negative for dizziness and light-headedness.   Psychiatric/Behavioral: Negative for agitation and behavioral problems.        All pertinent negatives and positives are as above. All other systems have been reviewed and are negative unless otherwise stated.     Objective      Vital Sign Min/Max for last 24 hours  Temp  Min: 96.4 °F (35.8 °C)  Max: 97.5 °F (36.4 °C)   BP  Min: 113/56  Max: 134/64   Pulse  Min: 80  Max: 96   Resp  Min: 18  Max: 22   SpO2  Min: 92 %  Max: 96 %   Flow (L/min)  Min: 2  Max: 2   No Data Recorded         Physical Exam   Constitutional: He appears well-developed.   HENT:   Head:  Normocephalic.   Neck: Normal range of motion.   Cardiovascular: Normal rate, regular rhythm and normal heart sounds.    Pulmonary/Chest: He has decreased breath sounds. He has wheezes. He has rales.   Abdominal: Soft. Bowel sounds are normal.   G tube present    Musculoskeletal:        Left knee: He exhibits decreased range of motion, swelling and erythema.        Legs:  Neurological:   Sleepy but arousable   Skin: Skin is warm.   Vitals reviewed.      Current Facility-Administered Medications   Medication Dose Route Frequency Provider Last Rate Last Dose   • acetaminophen (TYLENOL) tablet 650 mg  650 mg Oral Once PRN Munir Laurent MD        Or   • acetaminophen (TYLENOL) suppository 650 mg  650 mg Rectal Once PRN Munir Laurent MD       • albuterol (PROVENTIL HFA;VENTOLIN HFA) inhaler 2 puff  2 puff Inhalation Q4H PRN Jatin Boone MD       • albuterol (PROVENTIL) nebulizer solution 0.5% 2.5 mg/0.5mL  2.5 mg Nebulization Q6H PRN Jatin Boone MD       • ALPRAZolam (XANAX) tablet 0.25 mg  0.25 mg Per G Tube Daily Jose Phillips MD   0.25 mg at 08/16/17 0843   • ALPRAZolam (XANAX) tablet 0.5 mg  0.5 mg Per G Tube Nightly Jose Phillips MD   0.5 mg at 08/15/17 2132   • amLODIPine (NORVASC) tablet 5 mg  5 mg Per G Tube Daily Irma Reynaga MD   5 mg at 08/16/17 0844   • aztreonam (AZACTAM) 1 g/100 mL 0.9% NS IVPB (mbp)  1 g Intravenous Q8H Omari Rivero MD   1 g at 08/16/17 1233   • CloNIDine (CATAPRES-TTS) 0.2 MG/24HR patch 1 patch  1 patch Transdermal Weekly Jatin Boone MD   1 patch at 08/13/17 2006   • diphenhydrAMINE (BENADRYL) injection 12.5 mg  12.5 mg Intravenous Q15 Min PRN Munir Laurent MD       • ePHEDrine injection 5 mg  5 mg Intravenous Once PRN Munir Laurent MD       • famotidine (PEPCID) tablet 40 mg  40 mg Per G Tube Daily Jose Phillips MD   40 mg at 08/16/17 0844   • flumazenil (ROMAZICON) injection 0.2 mg  0.2 mg Intravenous PRN Munir Laurent MD       • FLUoxetine (PROzac) capsule  40 mg  40 mg Per G Tube BID Jose Phillips MD   40 mg at 08/16/17 1737   • heparin (porcine) 5000 UNIT/ML injection 5,000 Units  5,000 Units Subcutaneous Q8H Jose Phillips MD   5,000 Units at 08/16/17 1451   • HYDROcodone-acetaminophen (NORCO)  MG per tablet 1 tablet  1 tablet Oral Q4H PRN Jatin Boone MD   1 tablet at 08/16/17 0851   • HYDROmorphone (DILAUDID) injection 0.5 mg  0.5 mg Intravenous Q5 Min PRN Munir Laurent MD       • ipratropium-albuterol (DUO-NEB) nebulizer solution 3 mL  3 mL Nebulization 4x Daily - RT Jatin Boone MD   3 mL at 08/16/17 1549   • labetalol (NORMODYNE,TRANDATE) injection 5 mg  5 mg Intravenous Q5 Min PRN Munir Laurent MD       • levoFLOXacin (LEVAQUIN) 500 mg/100 mL D5W (premix) (LEVAQUIN) 500 mg  500 mg Intravenous Q48H Omari Rivero MD   500 mg at 08/15/17 1128   • lidocaine (XYLOCAINE) 2 % jelly    PRN Christian Crawford MD   10 mL at 08/13/17 1823   • Morphine sulfate (PF) injection 0.52 mg  0.52 mg Intravenous Q4H PRN Jose Phillips MD   0.52 mg at 08/15/17 0517    And   • naloxone (NARCAN) injection 0.4 mg  0.4 mg Intravenous Q5 Min PRN Jose Phillips MD       • nortriptyline (PAMELOR) capsule 50 mg  50 mg Per G Tube Nightly Jose Phillips MD   50 mg at 08/15/17 2132   • ondansetron (ZOFRAN) injection 4 mg  4 mg Intravenous Once PRN Munir Laurent MD       • Pharmacy to dose vancomycin   Does not apply Continuous PRN Jatin Boone MD       • polyethylene glycol (MIRALAX) powder 17 g  17 g Per G Tube Daily Jose Phillips MD   17 g at 08/16/17 0844   • promethazine (PHENERGAN) tablet 12.5 mg  12.5 mg Oral Q6H PRN Jatin Boone MD        Or   • promethazine (PHENERGAN) injection 12.5 mg  12.5 mg Intramuscular Q6H PRN Jatin Yasir Boone MD        Or   • promethazine (PHENERGAN) suppository 12.5 mg  12.5 mg Rectal Q6H PRN Jatin Yasir Boone MD       • sodium chloride 0.9 % bolus 1,000 mL  1,000 mL Intravenous Once Omari Rivero MD       • sodium chloride 0.9 %  flush 1-10 mL  1-10 mL Intravenous PRN Jatin Boone MD       • tamsulosin (FLOMAX) 24 hr capsule 0.4 mg  0.4 mg Oral Daily Jatin Boone MD   0.4 mg at 08/16/17 0844   • vancomycin (VANCOCIN) 1,500 mg in sodium chloride 0.9 % 500 mL IVPB  1,500 mg Intravenous Q48H Jatin Boone MD   1,500 mg at 08/15/17 1653           Results Review:  I have reviewed the labs, radiology results, and diagnostic studies.    Laboratory Data:     Results from last 7 days  Lab Units 08/16/17  0611 08/15/17  1546 08/15/17  0632 08/15/17  0013  08/13/17  1007   SODIUM mmol/L 133* 130* 132* 131*  < > 120*   SODIUM, ARTERIAL   --   --   --   --   < >  --    POTASSIUM mmol/L 3.5  --  3.6 3.9  < > 4.8   CHLORIDE mmol/L 99  --  96 95  < > 84*   CO2 mmol/L 26.0  --  26.0 25.0  < > 23.0   BUN mg/dL 58*  --  69* 69*  < > 70*   CREATININE mg/dL 2.18*  --  2.90* 3.19*  < > 3.79*   GLUCOSE mg/dL 143*  --  170* 170*  < > 116*   GLUCOSE, ARTERIAL   --   --   --   --   < >  --    CALCIUM mg/dL 8.8  --  9.2 9.4  < > 8.7   BILIRUBIN mg/dL  --   --   --   --   --  0.8   ALK PHOS U/L  --   --   --   --   --  251*   ALT (SGPT) U/L  --   --   --   --   --  41   AST (SGOT) U/L  --   --   --   --   --  27   ANION GAP mmol/L 8.0  --  10.0 11.0  < > 13.0   < > = values in this interval not displayed.  Estimated Creatinine Clearance: 37.7 mL/min (by C-G formula based on Cr of 2.18).            Results from last 7 days  Lab Units 08/16/17  0611 08/15/17  0632 08/14/17  0612 08/13/17  1055 08/13/17  1007   WBC 10*3/mm3 6.49 7.96 11.93* 10.70* 10.47*   HEMOGLOBIN g/dL 10.9* 11.5* 12.0* 12.5* 11.9*   HEMATOCRIT % 32.8* 35.1* 36.8* 38.0* 36.4*   PLATELETS 10*3/mm3 216 179 161 164 152       Results from last 7 days  Lab Units 08/13/17  1055   INR  1.09       Culture Data:   No results found for: BLOODCX  No results found for: URINECX  No results found for: RESPCX  No results found for: WOUNDCX  No results found for: STOOLCX  No components found for:  BODYFLD         Blood Culture   Date Value Ref Range Status   08/13/2017 No growth at 24 hours  Preliminary   08/13/2017 No growth at less than 24 hours  Preliminary            Urine Culture   Date Value Ref Range Status   08/13/2017 Culture in progress  Preliminary   08/08/2017 Mixed Culture  Final      Radiology Data:   Imaging Results (last 24 hours)     ** No results found for the last 24 hours. **          I have reviewed the patient current medications.     Assessment/Plan     Active Hospital Problems (** Indicates Principal Problem)    Diagnosis Date Noted   • Acute cystitis [N30.00] 08/14/2017     S/p permanent you placement by Dr. Crawofrd.   Cultures pending   Pt is on antibiotics.        • Acute kidney injury [N17.9] 08/14/2017     Creatinine improved to 2.9.  Dr. Reynaga is on board.        • Cellulitis of left knee [L03.116] 08/14/2017     Pt is taking levaquin for pneumonia it will also cover for cellulitis.   Will monitor if does not improve will change the antibiotics.     Erythema has improved on the left lateral aspect of the knee       • Hyponatremia [E87.1] 08/14/2017     Improved to 132.   Dr. Reynaga on board.      • Pneumonia of both lungs due to infectious organism [J18.9] 08/13/2017     Day 4 of levaquin   Neb treatment.           Resolved Hospital Problems    Diagnosis Date Noted Date Resolved   No resolved problems to display.         Discharge Planning: I expect patient to be discharged to nursing home in 2-3 days.      DVT Prophylaxis: heparin               This document has been electronically signed by Jose Phillips MD on August 16, 2017 6:37 PM

## 2017-08-16 NOTE — CONSULTS
Adult Nutrition  Assessment    Patient Name:  Rex Anthony  YOB: 1940  MRN: [id number]  Admit Date:  8/13/2017    Assessment Date:  8/16/2017          Reason for Assessment       08/16/17 1612    Reason for Assessment    Reason For Assessment/Visit follow up protocol    Identified At Risk By Screening Criteria home tube feeding                    Labs/Tests/Procedures/Meds       08/16/17 1612    Labs/Tests/Procedures/Meds    Labs/Tests Review Reviewed;Glucose;Na+;Creat;BUN;Hgb Hct    Medication Review Reviewed, pertinent    Significant Vitals reviewed                    Comments:  MD/Nursing both said pt is tolerating tube feeding well. Pt is more active/feeling better. D/c back to nursing home soon. Admitted with double pneumonia. RDN staff to continue to monitor.        Electronically signed by:  Jaqueline Steven RD  08/16/17 4:14 PM

## 2017-08-16 NOTE — PROGRESS NOTES
[facility] Medicine Services  INPATIENT PROGRESS NOTE    Length of Stay: 2  Date of Admission: 8/13/2017  Primary Care Physician: Mingo Rodriguez MD    Subjective     Chief Complaint   Patient presents with   • Shortness of Breath   • Leg Swelling     HPI:  77 year old male presented with complain of left lower ext swelling and pain. Chest xray also show pneumonia. Pt also had chronic you Which was discontinued at the nursing home.  Patient is status post new prominent You placement by Dr. Crawford.  Patient is also found to have acute cystitis as well his been started on antibiotic.      8/15/2017 patient is being treated with antibiotic.  He appeared to be more alert today.  State that he is feeling little better however he still has a left maxillary swelling and still has mild shortness of air.    Review of Systems   HENT: Negative for rhinorrhea.    Respiratory: Positive for shortness of breath.    Cardiovascular: Positive for leg swelling (leg pain ). Negative for chest pain and palpitations.   Gastrointestinal: Negative for abdominal pain and vomiting.   Genitourinary: Negative for dysuria and urgency.   Neurological: Negative for dizziness and light-headedness.   Psychiatric/Behavioral: Negative for agitation and behavioral problems.        All pertinent negatives and positives are as above. All other systems have been reviewed and are negative unless otherwise stated.     Objective      Vital Sign Min/Max for last 24 hours  Temp  Min: 96.5 °F (35.8 °C)  Max: 99.2 °F (37.3 °C)   BP  Min: 109/58  Max: 139/66   Pulse  Min: 67  Max: 102   Resp  Min: 18  Max: 22   SpO2  Min: 92 %  Max: 98 %   Flow (L/min)  Min: 2  Max: 3   No Data Recorded         Physical Exam   Constitutional: He appears well-developed.   HENT:   Head: Normocephalic.   Neck: Normal range of motion.   Cardiovascular: Normal rate, regular rhythm and normal heart sounds.    Pulmonary/Chest: He has  decreased breath sounds. He has wheezes. He has rales.   Abdominal: Soft. Bowel sounds are normal.   G tube present    Musculoskeletal:        Left knee: He exhibits decreased range of motion, swelling and erythema.        Legs:  Neurological:   Sleepy but arousable   Skin: Skin is warm.   Vitals reviewed.      Current Facility-Administered Medications   Medication Dose Route Frequency Provider Last Rate Last Dose   • acetaminophen (TYLENOL) tablet 650 mg  650 mg Oral Once PRN Munir Laurent MD        Or   • acetaminophen (TYLENOL) suppository 650 mg  650 mg Rectal Once PRN Munir Laurent MD       • albuterol (PROVENTIL HFA;VENTOLIN HFA) inhaler 2 puff  2 puff Inhalation Q4H PRN Jatin Boone MD       • albuterol (PROVENTIL) nebulizer solution 0.5% 2.5 mg/0.5mL  2.5 mg Nebulization Q6H PRN Jatin Boone MD       • ALPRAZolam (XANAX) tablet 0.25 mg  0.25 mg Per G Tube Daily Jose Phillips MD   0.25 mg at 08/15/17 0848   • ALPRAZolam (XANAX) tablet 0.5 mg  0.5 mg Per G Tube Nightly Jose Phillips MD       • [START ON 8/16/2017] amLODIPine (NORVASC) tablet 5 mg  5 mg Per G Tube Daily Irma Reynaga MD       • aztreonam (AZACTAM) 1 g/100 mL 0.9% NS IVPB (mbp)  1 g Intravenous Q8H Omari Rivero MD   1 g at 08/15/17 1834   • CloNIDine (CATAPRES-TTS) 0.2 MG/24HR patch 1 patch  1 patch Transdermal Weekly Jatin Boone MD   1 patch at 08/13/17 2006   • diphenhydrAMINE (BENADRYL) injection 12.5 mg  12.5 mg Intravenous Q15 Min PRN Munir Laurent MD       • ePHEDrine injection 5 mg  5 mg Intravenous Once PRN Munir Laurent MD       • famotidine (PEPCID) tablet 40 mg  40 mg Per G Tube Daily Jose Phillips MD   40 mg at 08/15/17 0844   • flumazenil (ROMAZICON) injection 0.2 mg  0.2 mg Intravenous PRN Munir Laurent MD       • FLUoxetine (PROzac) capsule 40 mg  40 mg Per G Tube BID Jose Phillips MD   40 mg at 08/15/17 1700   • heparin (porcine) 5000 UNIT/ML injection 5,000 Units  5,000 Units Subcutaneous Q8H Jose  MD Jacqueline   5,000 Units at 08/15/17 1517   • HYDROcodone-acetaminophen (NORCO)  MG per tablet 1 tablet  1 tablet Oral Q4H PRN Jatin Boone MD       • HYDROmorphone (DILAUDID) injection 0.5 mg  0.5 mg Intravenous Q5 Min PRN Munir Laurent MD       • ipratropium-albuterol (DUO-NEB) nebulizer solution 3 mL  3 mL Nebulization 4x Daily - RT Jatin Boone MD   3 mL at 08/15/17 1717   • labetalol (NORMODYNE,TRANDATE) injection 5 mg  5 mg Intravenous Q5 Min PRN Munir Laurent MD       • levoFLOXacin (LEVAQUIN) 500 mg/100 mL D5W (premix) (LEVAQUIN) 500 mg  500 mg Intravenous Q48H Omari Rivero MD   500 mg at 08/15/17 1128   • lidocaine (XYLOCAINE) 2 % jelly    PRN Christian Crawford MD   10 mL at 08/13/17 1823   • Morphine sulfate (PF) injection 0.52 mg  0.52 mg Intravenous Q4H PRN Jose Phillips MD   0.52 mg at 08/15/17 0517    And   • naloxone (NARCAN) injection 0.4 mg  0.4 mg Intravenous Q5 Min PRN Jose Phillips MD       • nortriptyline (PAMELOR) capsule 50 mg  50 mg Per G Tube Nightly Jose Phillips MD   50 mg at 08/14/17 2218   • ondansetron (ZOFRAN) injection 4 mg  4 mg Intravenous Once PRN Munir Laurent MD       • Pharmacy to dose vancomycin   Does not apply Continuous PRN Jatin Boone MD       • polyethylene glycol (MIRALAX) powder 17 g  17 g Per G Tube Daily Jose Phillips MD   17 g at 08/15/17 0845   • promethazine (PHENERGAN) tablet 12.5 mg  12.5 mg Oral Q6H PRN Jatin Boone MD        Or   • promethazine (PHENERGAN) injection 12.5 mg  12.5 mg Intramuscular Q6H PRN Jatin Boone MD        Or   • promethazine (PHENERGAN) suppository 12.5 mg  12.5 mg Rectal Q6H PRN Jatin Boone MD       • sodium chloride 0.45 % infusion  75 mL/hr Intravenous Continuous Malika Haile MD 75 mL/hr at 08/15/17 1847 75 mL/hr at 08/15/17 1847   • sodium chloride 0.9 % bolus 1,000 mL  1,000 mL Intravenous Once Omari Rivero MD       • sodium chloride 0.9 % flush 1-10 mL  1-10 mL Intravenous PRN Jatin Cooley  MD Mely       • tamsulosin (FLOMAX) 24 hr capsule 0.4 mg  0.4 mg Oral Daily Jatin Boone MD   0.4 mg at 08/15/17 0844   • vancomycin (VANCOCIN) 1,500 mg in sodium chloride 0.9 % 500 mL IVPB  1,500 mg Intravenous Q48H Jatin Boone MD   1,500 mg at 08/15/17 1653           Results Review:  I have reviewed the labs, radiology results, and diagnostic studies.    Laboratory Data:     Results from last 7 days  Lab Units 08/15/17  1546 08/15/17  0632 08/15/17  0013 08/14/17  1913  08/13/17  1007   SODIUM mmol/L 130* 132* 131* 132*  < > 120*   SODIUM, ARTERIAL   --   --   --   --   < >  --    POTASSIUM mmol/L  --  3.6 3.9 4.0  < > 4.8   CHLORIDE mmol/L  --  96 95 95  < > 84*   CO2 mmol/L  --  26.0 25.0 27.0  < > 23.0   BUN mg/dL  --  69* 69* 71*  < > 70*   CREATININE mg/dL  --  2.90* 3.19* 3.42*  < > 3.79*   GLUCOSE mg/dL  --  170* 170* 166*  < > 116*   GLUCOSE, ARTERIAL   --   --   --   --   < >  --    CALCIUM mg/dL  --  9.2 9.4 9.3  < > 8.7   BILIRUBIN mg/dL  --   --   --   --   --  0.8   ALK PHOS U/L  --   --   --   --   --  251*   ALT (SGPT) U/L  --   --   --   --   --  41   AST (SGOT) U/L  --   --   --   --   --  27   ANION GAP mmol/L  --  10.0 11.0 10.0  < > 13.0   < > = values in this interval not displayed.  Estimated Creatinine Clearance: 28.4 mL/min (by C-G formula based on Cr of 2.9).            Results from last 7 days  Lab Units 08/15/17  0632 08/14/17  0612 08/13/17  1055 08/13/17  1007   WBC 10*3/mm3 7.96 11.93* 10.70* 10.47*   HEMOGLOBIN g/dL 11.5* 12.0* 12.5* 11.9*   HEMATOCRIT % 35.1* 36.8* 38.0* 36.4*   PLATELETS 10*3/mm3 179 161 164 152       Results from last 7 days  Lab Units 08/13/17  1055   INR  1.09       Culture Data:   Blood Culture   Date Value Ref Range Status   08/13/2017 No growth at 2 days  Preliminary   08/13/2017 No growth at 2 days  Preliminary     Urine Culture   Date Value Ref Range Status   08/13/2017 Mixed Culture  Final     No results found for: RESPCX  No results found  for: WOUNDCX  No results found for: STOOLCX  No components found for: BODYFLD         Blood Culture   Date Value Ref Range Status   08/13/2017 No growth at 24 hours  Preliminary   08/13/2017 No growth at less than 24 hours  Preliminary            Urine Culture   Date Value Ref Range Status   08/13/2017 Culture in progress  Preliminary   08/08/2017 Mixed Culture  Final      Radiology Data:   Imaging Results (last 24 hours)     ** No results found for the last 24 hours. **          I have reviewed the patient current medications.     Assessment/Plan     Active Hospital Problems (** Indicates Principal Problem)    Diagnosis Date Noted   • Acute cystitis [N30.00] 08/14/2017     S/p permanent you placement by Dr. Crawford.   Cultures pending   Pt is on antibiotics.        • Acute kidney injury [N17.9] 08/14/2017     Creatinine improved to 2.9.  Dr. Reynaga is on board.        • Cellulitis of left knee [L03.116] 08/14/2017     Pt is taking levaquin for pneumonia it will also cover for cellulitis.   Will monitor if does not improve will change the antibiotics.     Erythema has improved on the left lateral aspect of the knee       • Hyponatremia [E87.1] 08/14/2017     Improved to 132.   Dr. Reynaga on board.      • Pneumonia of both lungs due to infectious organism [J18.9] 08/13/2017     Day 3 of levaquin   Neb treatment.           Resolved Hospital Problems    Diagnosis Date Noted Date Resolved   No resolved problems to display.         Discharge Planning: I expect patient to be discharged to nursing home in 2-3 days.      DVT Prophylaxis: heparin               This document has been electronically signed by Jose Phillips MD on August 15, 2017 7:06 PM

## 2017-08-16 NOTE — PLAN OF CARE
Problem: Patient Care Overview (Adult)  Goal: Plan of Care Review  Outcome: Ongoing (interventions implemented as appropriate)    08/16/17 1403   Coping/Psychosocial Response Interventions   Plan Of Care Reviewed With patient   Patient Care Overview   Progress no change   Outcome Evaluation   Outcome Summary/Follow up Plan pt. is tolerating TF bolus. Pt. is more awake today       Goal: Adult Individualization and Mutuality  Outcome: Ongoing (interventions implemented as appropriate)  Goal: Discharge Needs Assessment  Outcome: Ongoing (interventions implemented as appropriate)    Problem: Pneumonia (Adult)  Goal: Signs and Symptoms of Listed Potential Problems Will be Absent or Manageable (Pneumonia)  Outcome: Ongoing (interventions implemented as appropriate)    Problem: Skin Integrity Impairment, Risk/Actual (Adult)  Goal: Skin Integrity/Wound Healing  Outcome: Ongoing (interventions implemented as appropriate)    Problem: Fall Risk (Adult)  Goal: Absence of Falls  Outcome: Ongoing (interventions implemented as appropriate)

## 2017-08-16 NOTE — PROGRESS NOTES
"[facility]  [address] [place] [address]. [address]  T - [phone]  F - [phone]     Progress Note          PATIENT  DEMOGRAPHICS   PATIENT NAME: Rex Anthony                      PHYSICIAN: Malika Haile MD  : 1940  MRN: [id number]   LOS: 3 days    Patient Care Team:  Mingo Rodriguez MD as PCP - General  Subjective   SUBJECTIVE   Feels good. Wants to go home. Denied any complaints         Objective   OBJECTIVE   Vital Signs  Temp:  [96.4 °F (35.8 °C)-97.5 °F (36.4 °C)] 97 °F (36.1 °C)  Heart Rate:  [85-96] 90  Resp:  [18-22] 22  BP: (118-134)/(62-68) 121/68    Flowsheet Rows         First Filed Value    Admission Height  75\" (190.5 cm) Documented at 2017 09    Admission Weight  243 lb 12.8 oz (111 kg) Documented at 2017 0925           I/O last 3 completed shifts:  In: 3807 [I.V.:1887; Other:600; NG/GT:1320]  Out: 4650 [Urine:4650]    PHYSICAL EXAM    Physical Exam   Constitutional: He appears well-developed. No distress.   Moderately nourished   HENT:   Head: Normocephalic and atraumatic.   Eyes: Pupils are equal, round, and reactive to light.   Cardiovascular: Normal rate, regular rhythm and normal heart sounds.  Exam reveals no gallop and no friction rub.    No murmur heard.  Pulmonary/Chest: Effort normal. No respiratory distress. He has wheezes. He has no rales. He exhibits no tenderness.   Abdominal: Soft. Bowel sounds are normal. He exhibits no distension. There is no tenderness.   Musculoskeletal: He exhibits edema. He exhibits no tenderness.   Right AKA   Neurological: He is alert.   Skin: He is not diaphoretic.   Nursing note and vitals reviewed.      RESULTS   Results Review:      Results from last 7 days  Lab Units 17  0611 08/15/17  1546 08/15/17  0632 08/15/17  0013  17  1007   SODIUM mmol/L 133* 130* 132* 131*  < > 120*   SODIUM, ARTERIAL   --   --   --   --   < >  --    POTASSIUM mmol/L 3.5  --  3.6 3.9  < > 4.8 "   CHLORIDE mmol/L 99  --  96 95  < > 84*   CO2 mmol/L 26.0  --  26.0 25.0  < > 23.0   BUN mg/dL 58*  --  69* 69*  < > 70*   CREATININE mg/dL 2.18*  --  2.90* 3.19*  < > 3.79*   CALCIUM mg/dL 8.8  --  9.2 9.4  < > 8.7   BILIRUBIN mg/dL  --   --   --   --   --  0.8   ALK PHOS U/L  --   --   --   --   --  251*   ALT (SGPT) U/L  --   --   --   --   --  41   AST (SGOT) U/L  --   --   --   --   --  27   GLUCOSE mg/dL 143*  --  170* 170*  < > 116*   GLUCOSE, ARTERIAL   --   --   --   --   < >  --    < > = values in this interval not displayed.    Estimated Creatinine Clearance: 37.7 mL/min (by C-G formula based on Cr of 2.18).      Results from last 7 days  Lab Units 08/16/17  0611 08/15/17  0632 08/14/17  0612 08/13/17  1055 08/13/17  1007   WBC 10*3/mm3 6.49 7.96 11.93* 10.70* 10.47*   HEMOGLOBIN g/dL 10.9* 11.5* 12.0* 12.5* 11.9*   PLATELETS 10*3/mm3 216 179 161 164 152         Results from last 7 days  Lab Units 08/13/17  1055   INR  1.09       Imaging Results (last 24 hours)     ** No results found for the last 24 hours. **           MEDICATIONS      ALPRAZolam 0.25 mg Per G Tube Daily   ALPRAZolam 0.5 mg Per G Tube Nightly   amLODIPine 5 mg Per G Tube Daily   aztreonam 1 g Intravenous Q8H   CloNIDine 1 patch Transdermal Weekly   famotidine 40 mg Per G Tube Daily   FLUoxetine 40 mg Per G Tube BID   heparin (porcine) 5,000 Units Subcutaneous Q8H   ipratropium-albuterol 3 mL Nebulization 4x Daily - RT   levoFLOXacin 500 mg Intravenous Q48H   nortriptyline 50 mg Per G Tube Nightly   polyethylene glycol 17 g Per G Tube Daily   sodium chloride 1,000 mL Intravenous Once   tamsulosin 0.4 mg Oral Daily   vancomycin 1,500 mg Intravenous Q48H       Pharmacy to dose vancomycin     sodium chloride 75 mL/hr Last Rate: 75 mL/hr (08/16/17 7392)       Assessment/Plan   ASSESSMENT / PLAN    Active Problems:    Pneumonia of both lungs due to infectious organism    Acute cystitis    Acute kidney injury    Cellulitis of left knee     Hyponatremia    1. Acute renal failure: Baseline creatinine is about 1.0mg/dl  - Etiology is most likely pre-renal and post renal. S/p you. H/o SPC and neurogenic bladder .   - Creatinine continues to improve. Excellent urine output  - Will discontinue IVFs    2. Hyponatremia: Mild  - Most likely hypovolemic hyponatremia  - Received DDAVP and D5W for rapid correction. Appropriately corrected now. Will discontinue IVFs as mentioned above.       3. Pneumonia:  - On antibiotics. Dose antibiotics for renal function      4. Hypertension:  - Blood pressure is controlled. Continue amlodipine 5mg daily and clonidine patch 0.2mg      5. Anemia:  - Hemoglobin is low at 10.9  - Will check iron studies, B12 and folate               This document has been electronically signed by Malika Haile MD on August 16, 2017 2:06 PM

## 2017-08-17 NOTE — PLAN OF CARE
Problem: Patient Care Overview (Adult)  Goal: Plan of Care Review  Outcome: Ongoing (interventions implemented as appropriate)    08/17/17 1426   Coping/Psychosocial Response Interventions   Plan Of Care Reviewed With patient   Patient Care Overview   Progress improving   Outcome Evaluation   Outcome Summary/Follow up Plan pt. is off 02, possible DC back to NH tomorrow       Goal: Adult Individualization and Mutuality  Outcome: Ongoing (interventions implemented as appropriate)  Goal: Discharge Needs Assessment  Outcome: Ongoing (interventions implemented as appropriate)    Problem: Pneumonia (Adult)  Goal: Signs and Symptoms of Listed Potential Problems Will be Absent or Manageable (Pneumonia)  Outcome: Ongoing (interventions implemented as appropriate)    Problem: Skin Integrity Impairment, Risk/Actual (Adult)  Goal: Skin Integrity/Wound Healing  Outcome: Ongoing (interventions implemented as appropriate)    Problem: Fall Risk (Adult)  Goal: Absence of Falls  Outcome: Ongoing (interventions implemented as appropriate)

## 2017-08-17 NOTE — PROGRESS NOTES
"Pharmacokinetics by Pharmacy - Vancomycin    Rex Anthony is a 77 y.o. male  [Ht: 75\" (190.5 cm); Wt: 254 lb 11.2 oz (116 kg)]    Estimated Creatinine Clearance: 52.4 mL/min (by C-G formula based on Cr of 1.57).   Lab Results   Component Value Date    CREATININE 1.57 (H) 08/17/2017    CREATININE 2.18 (H) 08/16/2017    CREATININE 2.90 (H) 08/15/2017      Lab Results   Component Value Date    WBC 6.37 08/17/2017    WBC 6.49 08/16/2017    WBC 7.96 08/15/2017      Temp Readings from Last 1 Encounters:   08/17/17 99 °F (37.2 °C) (Oral)      Lab Results   Component Value Date    VANCOTROUGH 14.83 08/17/2017    VANCORANDOM 18.26 08/14/2017         Culture Results:  Microbiology Results (last 10 days)       Procedure Component Value - Date/Time      Urine Culture [[id number]] Collected:  08/13/17 1809    Lab Status:  Final result Specimen:  Urine from Urine, Catheter Updated:  08/15/17 0552     Urine Culture Mixed Culture    Narrative:         Specimen contains mixed organisms of questionable pathogenicity which indicates contamination with commensal britta.  Further identification is unlikely to provide clinically useful information.  Suggest recollection.    CRE Screen by PCR [[id number]] Collected:  08/13/17 1418    Lab Status:  Final result Specimen:  Swab from Large Intestine, Rectum Updated:  08/13/17 1700     CRE SCREEN Not Detected      This test is performed by utilizing real time polymerace chain recation (PCR).         OXA 48 Strain Not Detected      Not Applicable.        IMP STRAIN Not Detected      Not Applicable        VIM STRAING Not Detected      Not Applicable        NDM Strain Not Detected      Not Applicable        KPC Strain Not Detected      Not Applicable       Blood Culture [[id number]]  (Normal) Collected:  08/13/17 1055    Lab Status:  Preliminary result Specimen:  Blood from Arm, Left Updated:  08/17/17 1201     Blood Culture No growth at 4 days    Blood Culture [[id number]]  (Normal) Collected: "  08/13/17 1007    Lab Status:  Preliminary result Specimen:  Blood from Arm, Right Updated:  08/17/17 1301     Blood Culture No growth at 4 days    Urine Culture [[id number]] Collected:  08/08/17 1308    Lab Status:  Final result Specimen:  Urine from Urine, Clean Catch Updated:  08/09/17 0632     Urine Culture Mixed Culture    Narrative:         Specimen contains mixed organisms of questionable pathogenicity which indicates contamination with commensal britta.  Further identification is unlikely to provide clinically useful information.  Suggest recollection.               Indication for use: pneumonia    Current Vancomycin Dose:  1500 mg IVPB every 48 hours, day 5 of therapy.      Assessment/Plan:  Reviewed above labs and cultures.   Vancomycin trough level was 14.83. ( goal 15-20). WBC is WNL and CrCl is improving. No changes at present time. Pharmacy will continue to monitor renal function and adjust dose accordingly.    Pepper De Leon [facility]   08/17/17 4:07 PM
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
## 2017-08-17 NOTE — PLAN OF CARE
Problem: Patient Care Overview (Adult)  Goal: Plan of Care Review  Outcome: Ongoing (interventions implemented as appropriate)  Goal: Adult Individualization and Mutuality  Outcome: Ongoing (interventions implemented as appropriate)  Goal: Discharge Needs Assessment  Outcome: Ongoing (interventions implemented as appropriate)    Problem: Pneumonia (Adult)  Goal: Signs and Symptoms of Listed Potential Problems Will be Absent or Manageable (Pneumonia)  Outcome: Ongoing (interventions implemented as appropriate)    Problem: Skin Integrity Impairment, Risk/Actual (Adult)  Goal: Skin Integrity/Wound Healing  Outcome: Ongoing (interventions implemented as appropriate)    Problem: Fall Risk (Adult)  Goal: Absence of Falls  Outcome: Ongoing (interventions implemented as appropriate)

## 2017-08-17 NOTE — PROGRESS NOTES
"[facility]  [address] [place] [address]. [address]  T - [phone]  F - [phone]     Progress Note          PATIENT  DEMOGRAPHICS   PATIENT NAME: Rex Anthony                      PHYSICIAN: Malika Haile MD  : 1940  MRN: [id number]   LOS: 4 days    Patient Care Team:  Mingo Rodriguez MD as PCP - General  Subjective   SUBJECTIVE   Feels good. Wants to go home. No SOB. Appetite is good.          Objective   OBJECTIVE   Vital Signs  Temp:  [97 °F (36.1 °C)-98.6 °F (37 °C)] 98.4 °F (36.9 °C)  Heart Rate:  [80-97] 85  Resp:  [18-22] 20  BP: (108-121)/(56-70) 121/64    Flowsheet Rows         First Filed Value    Admission Height  75\" (190.5 cm) Documented at 2017 09    Admission Weight  243 lb 12.8 oz (111 kg) Documented at 2017 0925           I/O last 3 completed shifts:  In: 2990 [I.V.:950; Other:700; NG/GT:1340]  Out: 4150 [Urine:4150]    PHYSICAL EXAM    Physical Exam   Constitutional: He appears well-developed. No distress.   Moderately nourished   HENT:   Head: Normocephalic and atraumatic.   Eyes: Pupils are equal, round, and reactive to light.   Cardiovascular: Normal rate, regular rhythm and normal heart sounds.  Exam reveals no gallop and no friction rub.    No murmur heard.  Pulmonary/Chest: Effort normal. No respiratory distress. He has no wheezes. He has no rales. He exhibits no tenderness.   Abdominal: Soft. Bowel sounds are normal. He exhibits no distension. There is no tenderness.   Musculoskeletal: He exhibits edema. He exhibits no tenderness.   Right AKA   Neurological: He is alert.   Skin: He is not diaphoretic.   Nursing note and vitals reviewed.      RESULTS   Results Review:      Results from last 7 days  Lab Units 17  0648 17  0611 08/15/17  1546 08/15/17  0632  17  1007   SODIUM mmol/L 135* 133* 130* 132*  < > 120*   SODIUM, ARTERIAL   --   --   --   --   < >  --    POTASSIUM mmol/L 4.1 3.5  --  3.6  < > 4.8 "   CHLORIDE mmol/L 103 99  --  96  < > 84*   CO2 mmol/L 23.0 26.0  --  26.0  < > 23.0   BUN mg/dL 48* 58*  --  69*  < > 70*   CREATININE mg/dL 1.57* 2.18*  --  2.90*  < > 3.79*   CALCIUM mg/dL 8.8 8.8  --  9.2  < > 8.7   BILIRUBIN mg/dL  --   --   --   --   --  0.8   ALK PHOS U/L  --   --   --   --   --  251*   ALT (SGPT) U/L  --   --   --   --   --  41   AST (SGOT) U/L  --   --   --   --   --  27   GLUCOSE mg/dL 150* 143*  --  170*  < > 116*   GLUCOSE, ARTERIAL   --   --   --   --   < >  --    < > = values in this interval not displayed.    Estimated Creatinine Clearance: 52.4 mL/min (by C-G formula based on Cr of 1.57).      Results from last 7 days  Lab Units 08/17/17  0648 08/16/17  0611 08/15/17  0632 08/14/17  0612 08/13/17  1055   WBC 10*3/mm3 6.37 6.49 7.96 11.93* 10.70*   HEMOGLOBIN g/dL 10.3* 10.9* 11.5* 12.0* 12.5*   PLATELETS 10*3/mm3 215 216 179 161 164         Results from last 7 days  Lab Units 08/13/17  1055   INR  1.09       Imaging Results (last 24 hours)     ** No results found for the last 24 hours. **           MEDICATIONS      ALPRAZolam 0.25 mg Per G Tube Daily   ALPRAZolam 0.5 mg Per G Tube Nightly   amLODIPine 5 mg Per G Tube Daily   aztreonam 1 g Intravenous Q8H   CloNIDine 1 patch Transdermal Weekly   famotidine 40 mg Per G Tube Daily   FLUoxetine 40 mg Per G Tube BID   heparin (porcine) 5,000 Units Subcutaneous Q8H   ipratropium-albuterol 3 mL Nebulization 4x Daily - RT   levoFLOXacin 500 mg Intravenous Q48H   nortriptyline 50 mg Per G Tube Nightly   polyethylene glycol 17 g Per G Tube Daily   sodium chloride 1,000 mL Intravenous Once   tamsulosin 0.4 mg Oral Daily   vancomycin 1,500 mg Intravenous Q48H       Pharmacy to dose vancomycin        Assessment/Plan   ASSESSMENT / PLAN    Active Problems:    Pneumonia of both lungs due to infectious organism    Acute cystitis    Acute kidney injury    Cellulitis of left knee    Hyponatremia    1. Acute renal failure: Baseline creatinine is about  1.0mg/dl  - Etiology is most likely pre-renal and post renal. S/p you. H/o SPC and neurogenic bladder .   - Creatinine continues to improve. Excellent urine output    2. Hyponatremia: Mild  - Most likely hypovolemic hyponatremia  - Received DDAVP and D5W for rapid correction. Appropriately corrected now.       3. Pneumonia:  - On antibiotics. Dose antibiotics for renal function      4. Hypertension:  - Blood pressure is controlled. Continue amlodipine 5mg daily and clonidine patch 0.2mg      5. Anemia:  - Hemoglobin is acceptable at 10.3  - Ferritin is 175, Tsat is 15%, B12 > 1000, folate 16.2               This document has been electronically signed by Malika Haile MD on August 17, 2017 9:02 AM

## 2017-08-18 NOTE — NURSING NOTE
Pt had 25mcg Fentanyl patch to R shoulder that was dated 8/10/17. Patch removed and wasted with Adrianna Carmona RN.

## 2017-08-18 NOTE — PROGRESS NOTES
"[facility]  [address] [place] [address]. [address]  T - [phone]  F - [phone]     Progress Note          PATIENT  DEMOGRAPHICS   PATIENT NAME: Rex Anthony                      PHYSICIAN: Malika Haile MD  : 1940  MRN: [id number]   LOS: 5 days    Patient Care Team:  Mingo Rodriguez MD as PCP - General  Subjective   SUBJECTIVE   Denied any complaints including chest pain, SOB, nausea, vomiting. Feels good. Wants to go home.          Objective   OBJECTIVE   Vital Signs  Temp:  [97.1 °F (36.2 °C)-99 °F (37.2 °C)] 98.4 °F (36.9 °C)  Heart Rate:  [81-95] 81  Resp:  [16-20] 18  BP: (128-136)/(60-72) 128/60    Flowsheet Rows         First Filed Value    Admission Height  75\" (190.5 cm) Documented at 2017 0925    Admission Weight  243 lb 12.8 oz (111 kg) Documented at 2017 0925           I/O last 3 completed shifts:  In: 1700 [Other:600; NG/GT:1100]  Out: 4450 [Urine:4450]    PHYSICAL EXAM    Physical Exam   Constitutional: He appears well-developed. No distress.   Moderately nourished   HENT:   Head: Normocephalic and atraumatic.   Eyes: Pupils are equal, round, and reactive to light.   Cardiovascular: Normal rate, regular rhythm and normal heart sounds.  Exam reveals no gallop and no friction rub.    No murmur heard.  Pulmonary/Chest: Effort normal. No respiratory distress. He has no wheezes. He has no rales. He exhibits no tenderness.   Abdominal: Soft. Bowel sounds are normal. He exhibits no distension. There is no tenderness.   Musculoskeletal: He exhibits edema. He exhibits no tenderness.   Right AKA   Neurological: He is alert.   Skin: He is not diaphoretic.   Nursing note and vitals reviewed.      RESULTS   Results Review:      Results from last 7 days  Lab Units 17  0517 17  0648 17  0611  17  1007   SODIUM mmol/L 137 135* 133*  < > 120*   SODIUM, ARTERIAL   --   --   --   < >  --    POTASSIUM mmol/L 4.0 4.1 3.5  < > 4.8 "   CHLORIDE mmol/L 104 103 99  < > 84*   CO2 mmol/L 24.0 23.0 26.0  < > 23.0   BUN mg/dL 40* 48* 58*  < > 70*   CREATININE mg/dL 1.42* 1.57* 2.18*  < > 3.79*   CALCIUM mg/dL 9.2 8.8 8.8  < > 8.7   BILIRUBIN mg/dL  --   --   --   --  0.8   ALK PHOS U/L  --   --   --   --  251*   ALT (SGPT) U/L  --   --   --   --  41   AST (SGOT) U/L  --   --   --   --  27   GLUCOSE mg/dL 92 150* 143*  < > 116*   GLUCOSE, ARTERIAL   --   --   --   < >  --    < > = values in this interval not displayed.    Estimated Creatinine Clearance: 57.9 mL/min (by C-G formula based on Cr of 1.42).      Results from last 7 days  Lab Units 08/18/17  0517 08/17/17  0648 08/16/17  0611 08/15/17  0632 08/14/17  0612   WBC 10*3/mm3 7.29 6.37 6.49 7.96 11.93*   HEMOGLOBIN g/dL 10.3* 10.3* 10.9* 11.5* 12.0*   PLATELETS 10*3/mm3 229 215 216 179 161         Results from last 7 days  Lab Units 08/13/17  1055   INR  1.09       Imaging Results (last 24 hours)     ** No results found for the last 24 hours. **           MEDICATIONS      ALPRAZolam 0.25 mg Per G Tube Daily   ALPRAZolam 0.5 mg Per G Tube Nightly   amLODIPine 5 mg Per G Tube Daily   aztreonam 1 g Intravenous Q8H   CloNIDine 1 patch Transdermal Weekly   famotidine 40 mg Per G Tube Daily   FLUoxetine 40 mg Per G Tube BID   heparin (porcine) 5,000 Units Subcutaneous Q8H   ipratropium-albuterol 3 mL Nebulization 4x Daily - RT   levoFLOXacin 500 mg Intravenous Q48H   nortriptyline 50 mg Per G Tube Nightly   polyethylene glycol 17 g Per G Tube Daily   sodium chloride 1,000 mL Intravenous Once   tamsulosin 0.4 mg Oral Daily   vancomycin 1,500 mg Intravenous Q48H       Pharmacy to dose vancomycin        Assessment/Plan   ASSESSMENT / PLAN    Active Problems:    Pneumonia of both lungs due to infectious organism    Acute cystitis    Acute kidney injury    Cellulitis of left knee    Hyponatremia    1. Acute renal failure: Baseline creatinine is about 1.0mg/dl  - Etiology is most likely pre-renal and post  renal. S/p you. H/o SPC and neurogenic bladder .   - Creatinine continues to improve. Excellent urine output  - Maintain hemodynamics. Monitor lytes, creatinine and urine output    2. Hyponatremia: Improved  - Most likely hypovolemic hyponatremia  - Received DDAVP and D5W for rapid correction. Appropriately corrected now.     3. Obstructive Uropathy:  - Has you catheter. Urology following      4. Pneumonia:  - On antibiotics. Dose antibiotics for renal function      5. Hypertension:  - Blood pressure is controlled. Continue amlodipine 5mg daily and clonidine patch 0.2mg      6. Anemia:  - Hemoglobin is acceptable at 10.3  - Ferritin is 175, Tsat is 15%. B12 is > 1000, folate is 16.2           This document has been electronically signed by Malika Haile MD on August 18, 2017 8:36 AM

## 2017-08-18 NOTE — PROGRESS NOTES
[facility] Medicine Services  INPATIENT PROGRESS NOTE    Length of Stay: 4  Date of Admission: 8/13/2017  Primary Care Physician: Mingo Rodriguez MD    Subjective     Chief Complaint   Patient presents with   • Shortness of Breath   • Leg Swelling     HPI:  77 year old male presented with complain of left lower ext swelling and pain. Chest xray also show pneumonia. Pt also had chronic you Which was discontinued at the nursing home.  Patient is status post new prominent You placement by Dr. Crawford.  Patient is also found to have acute cystitis as well his been started on antibiotic.      8/15/2017 patient is being treated with antibiotic.  He appeared to be more alert today.  State that he is feeling little better however he still has a left maxillary swelling and still has mild shortness of air.    8/16/2017: Patient is more awake.  Patient state that he is feeling much better.  He is not requiring oxygen.  His left knee swelling is improved.    8/17/17: pt is much more awake today. Able to answer the question. Feeling better today.     Review of Systems   HENT: Negative for rhinorrhea.    Respiratory: Positive for shortness of breath.    Cardiovascular: Positive for leg swelling (leg pain ). Negative for chest pain and palpitations.   Gastrointestinal: Negative for abdominal pain and vomiting.   Genitourinary: Negative for dysuria and urgency.   Neurological: Negative for dizziness and light-headedness.   Psychiatric/Behavioral: Negative for agitation and behavioral problems.        All pertinent negatives and positives are as above. All other systems have been reviewed and are negative unless otherwise stated.     Objective      Vital Sign Min/Max for last 24 hours  Temp  Min: 97.7 °F (36.5 °C)  Max: 99 °F (37.2 °C)   BP  Min: 108/63  Max: 136/67   Pulse  Min: 81  Max: 97   Resp  Min: 16  Max: 20   SpO2  Min: 91 %  Max: 95 %   Flow (L/min)  Min: 2  Max: 2   No Data  Recorded         Physical Exam   Constitutional: He appears well-developed.   HENT:   Head: Normocephalic.   Neck: Normal range of motion.   Cardiovascular: Normal rate, regular rhythm and normal heart sounds.    Pulmonary/Chest: He has decreased breath sounds. He has wheezes. He has rales.   Abdominal: Soft. Bowel sounds are normal.   G tube present    Musculoskeletal:        Left knee: He exhibits decreased range of motion, swelling and erythema.        Legs:  Neurological:   Sleepy but arousable   Skin: Skin is warm.   Vitals reviewed.      Current Facility-Administered Medications   Medication Dose Route Frequency Provider Last Rate Last Dose   • acetaminophen (TYLENOL) tablet 650 mg  650 mg Oral Once PRN Munir Laurent MD        Or   • acetaminophen (TYLENOL) suppository 650 mg  650 mg Rectal Once PRN Munir Laurent MD       • albuterol (PROVENTIL HFA;VENTOLIN HFA) inhaler 2 puff  2 puff Inhalation Q4H PRN Jatin Boone MD       • albuterol (PROVENTIL) nebulizer solution 0.5% 2.5 mg/0.5mL  2.5 mg Nebulization Q6H PRN Jatin Boone MD       • ALPRAZolam (XANAX) tablet 0.25 mg  0.25 mg Per G Tube Daily Jose Phillips MD   0.25 mg at 08/17/17 0908   • ALPRAZolam (XANAX) tablet 0.5 mg  0.5 mg Per G Tube Nightly Jose Phillips MD   0.5 mg at 08/16/17 2124   • amLODIPine (NORVASC) tablet 5 mg  5 mg Per G Tube Daily Irma Reynaga MD   5 mg at 08/17/17 0908   • aztreonam (AZACTAM) 1 g/100 mL 0.9% NS IVPB (mbp)  1 g Intravenous Q8H Omari Rivero MD 0 mL/hr at 08/16/17 2217 1 g at 08/17/17 1105   • CloNIDine (CATAPRES-TTS) 0.2 MG/24HR patch 1 patch  1 patch Transdermal Weekly Jatin Boone MD   1 patch at 08/13/17 2006   • diphenhydrAMINE (BENADRYL) injection 12.5 mg  12.5 mg Intravenous Q15 Min PRN Munir Laurent MD       • ePHEDrine injection 5 mg  5 mg Intravenous Once PRN Munir Laurent MD       • famotidine (PEPCID) tablet 40 mg  40 mg Per G Tube Daily Jose Phillips MD   40 mg at 08/17/17 0908   •  flumazenil (ROMAZICON) injection 0.2 mg  0.2 mg Intravenous PRN Munir Laurent MD       • FLUoxetine (PROzac) capsule 40 mg  40 mg Per G Tube BID Jose Phillips MD   40 mg at 08/17/17 1742   • heparin (porcine) 5000 UNIT/ML injection 5,000 Units  5,000 Units Subcutaneous Q8H Jose Phillips MD   5,000 Units at 08/17/17 1344   • HYDROcodone-acetaminophen (NORCO)  MG per tablet 1 tablet  1 tablet Oral Q4H PRN Jatin Boone MD   1 tablet at 08/17/17 0908   • HYDROmorphone (DILAUDID) injection 0.5 mg  0.5 mg Intravenous Q5 Min PRN Munir Laurent MD       • ipratropium-albuterol (DUO-NEB) nebulizer solution 3 mL  3 mL Nebulization 4x Daily - RT Jatin Boone MD   3 mL at 08/17/17 1906   • labetalol (NORMODYNE,TRANDATE) injection 5 mg  5 mg Intravenous Q5 Min PRN Munir Laurent MD       • levoFLOXacin (LEVAQUIN) 500 mg/100 mL D5W (premix) (LEVAQUIN) 500 mg  500 mg Intravenous Q48H Omari Rivero MD   500 mg at 08/17/17 1105   • lidocaine (XYLOCAINE) 2 % jelly    PRN Christian Crawford MD   10 mL at 08/13/17 1823   • Morphine sulfate (PF) injection 0.52 mg  0.52 mg Intravenous Q4H PRN Jose Phillips MD   0.52 mg at 08/15/17 0517    And   • naloxone (NARCAN) injection 0.4 mg  0.4 mg Intravenous Q5 Min PRN Jose Phillips MD       • nortriptyline (PAMELOR) capsule 50 mg  50 mg Per G Tube Nightly Jose Phillips MD   50 mg at 08/16/17 2124   • ondansetron (ZOFRAN) injection 4 mg  4 mg Intravenous Once PRN Munir Laurent MD       • Pharmacy to dose vancomycin   Does not apply Continuous PRN Jatin Boone MD       • polyethylene glycol (MIRALAX) powder 17 g  17 g Per G Tube Daily Jose Phillips MD   17 g at 08/17/17 0908   • promethazine (PHENERGAN) tablet 12.5 mg  12.5 mg Oral Q6H PRN Jatin Boone MD        Or   • promethazine (PHENERGAN) injection 12.5 mg  12.5 mg Intramuscular Q6H PRN aJtin Boone MD        Or   • promethazine (PHENERGAN) suppository 12.5 mg  12.5 mg Rectal Q6H PRN Jatin Boone MD        • sodium chloride 0.9 % bolus 1,000 mL  1,000 mL Intravenous Once Omari Rivero MD       • sodium chloride 0.9 % flush 1-10 mL  1-10 mL Intravenous PRN Jatin Boone MD       • tamsulosin (FLOMAX) 24 hr capsule 0.4 mg  0.4 mg Oral Daily Jatin Boone MD   0.4 mg at 08/17/17 0908   • vancomycin (VANCOCIN) 1,500 mg in sodium chloride 0.9 % 500 mL IVPB  1,500 mg Intravenous Q48H Jatin Boone MD   1,500 mg at 08/17/17 1521           Results Review:  I have reviewed the labs, radiology results, and diagnostic studies.    Laboratory Data:     Results from last 7 days  Lab Units 08/17/17  0648 08/16/17  0611 08/15/17  1546 08/15/17  0632  08/13/17  1007   SODIUM mmol/L 135* 133* 130* 132*  < > 120*   SODIUM, ARTERIAL   --   --   --   --   < >  --    POTASSIUM mmol/L 4.1 3.5  --  3.6  < > 4.8   CHLORIDE mmol/L 103 99  --  96  < > 84*   CO2 mmol/L 23.0 26.0  --  26.0  < > 23.0   BUN mg/dL 48* 58*  --  69*  < > 70*   CREATININE mg/dL 1.57* 2.18*  --  2.90*  < > 3.79*   GLUCOSE mg/dL 150* 143*  --  170*  < > 116*   GLUCOSE, ARTERIAL   --   --   --   --   < >  --    CALCIUM mg/dL 8.8 8.8  --  9.2  < > 8.7   BILIRUBIN mg/dL  --   --   --   --   --  0.8   ALK PHOS U/L  --   --   --   --   --  251*   ALT (SGPT) U/L  --   --   --   --   --  41   AST (SGOT) U/L  --   --   --   --   --  27   ANION GAP mmol/L 9.0 8.0  --  10.0  < > 13.0   < > = values in this interval not displayed.  Estimated Creatinine Clearance: 52.4 mL/min (by C-G formula based on Cr of 1.57).            Results from last 7 days  Lab Units 08/17/17  0648 08/16/17  0611 08/15/17  0632 08/14/17  0612 08/13/17  1055   WBC 10*3/mm3 6.37 6.49 7.96 11.93* 10.70*   HEMOGLOBIN g/dL 10.3* 10.9* 11.5* 12.0* 12.5*   HEMATOCRIT % 33.3* 32.8* 35.1* 36.8* 38.0*   PLATELETS 10*3/mm3 215 216 179 161 164       Results from last 7 days  Lab Units 08/13/17  1055   INR  1.09       Culture Data:   No results found for: BLOODCX  No results found for: URINECX  No results  found for: RESPCX  No results found for: WOUNDCX  No results found for: STOOLCX  No components found for: BODYFLD         Blood Culture   Date Value Ref Range Status   08/13/2017 No growth at 24 hours  Preliminary   08/13/2017 No growth at less than 24 hours  Preliminary            Urine Culture   Date Value Ref Range Status   08/13/2017 Culture in progress  Preliminary   08/08/2017 Mixed Culture  Final      Radiology Data:   Imaging Results (last 24 hours)     ** No results found for the last 24 hours. **          I have reviewed the patient current medications.     Assessment/Plan     Active Hospital Problems (** Indicates Principal Problem)    Diagnosis Date Noted   • Acute cystitis [N30.00] 08/14/2017     S/p permanent you placement by Dr. Crawford.   Cultures pending   Pt is on antibiotics.        • Acute kidney injury [N17.9] 08/14/2017     Creatinine improved to 1.57 .  Dr. Reynaga is on board.        • Cellulitis of left knee [L03.116] 08/14/2017     Pt is taking levaquin for pneumonia it will also cover for cellulitis.   Will monitor if does not improve will change the antibiotics.     Erythema has improved on the left lateral aspect of the knee       • Hyponatremia [E87.1] 08/14/2017     Improved to 135  Dr. Reynaga on board.      • Pneumonia of both lungs due to infectious organism [J18.9] 08/13/2017     Day 4 of levaquin   Neb treatment.           Resolved Hospital Problems    Diagnosis Date Noted Date Resolved   No resolved problems to display.         Discharge Planning: I expect patient to be discharged to nursing home in 2-3 days.      DVT Prophylaxis: heparin               This document has been electronically signed by Jose Phillips MD on August 17, 2017 7:30 PM

## 2017-08-18 NOTE — DISCHARGE SUMMARY
[address] [address]  T - [phone]     DISCHARGE SUMMARY         PATIENT  DEMOGRAPHICS   PATIENT NAME: Rex Anthony                      PHYSICIAN: Jose Phillips MD  : 1940  MRN: [id number]    ADMISSION/DISCHARGE INFO   ADMISSION DATE: 2017   DISCHARGE DATE: 2017    ADMISSION DIAGNOSES: Hyponatremia [E87.1]  Left leg cellulitis [L03.116]  Acute renal failure, unspecified acute renal failure type [N17.9]  Pneumonia of both lungs due to infectious organism, unspecified part of lung [J18.9]  DISCHARGE DIAGNOSES:     Active Problems:    Pneumonia of both lungs due to infectious organism    Acute cystitis    Acute kidney injury    Cellulitis of left knee    Hyponatremia      SERVICE:  Medicine       CONSULTS   Consult Orders (all)     Start     Ordered    17 1435  Inpatient Consult to Nutrition Services  Once     Provider:  (Not yet assigned)    17 1439    17 1709  Inpatient Consult to Urology  Once     Specialty:  Urology  Provider:  Christian Crawford MD    17 1708    17 1436  Inpatient Consult to Nephrology  Once     Specialty:  Nephrology  Provider:  Irma Reynaga MD    17 1435    17 1323  Inpatient Consult to Case Management   Once     Provider:  (Not yet assigned)    17 1324    17 1323  Inpatient Consult to Nutrition  Once     Comments:  At [facility], pt is NPO and does the following tube feeds: 2Cal 180ml BID at 0800 and 2000 and 240ml at 1200 and 0001.   Provider:  (Not yet assigned)    17 1324    17 1203  Hospitalist (on-call MD unless specified)  Once     Specialty:  Hospitalist  Provider:  Jatin Boone MD    17 1204    17 1202  Nephrology - [place] (on-call MD from [facility] unless specified)  Once     Specialty:  Nephrology  Provider:  Malika Haile MD    17 1204          PROCEDURES     Imaging Results (last 24 hours)     ** No  results found for the last 24 hours. **          Xr Knee 3 View Left    Result Date: 8/13/2017  Narrative: PROCEDURE: XR KNEE 3 VW LEFT VIEWS: 3 INDICATION: Cellulitis COMPARISON: None FINDINGS:   -No fracture identified. Diffusely decreased osseous mineralization is present. Tricompartmental degenerative changes are present. There is medial greater than lateral joint space narrowing. There is heterotopic calcification or calcified structures within the joint capsule in the suprapatellar space. Subcutaneous stranding is seen at and superior to the level of the knee, medial greater than lateral.     Impression: CONCLUSION: Subcutaneous soft tissue stranding, probably representing patient's reported cellulitis. No acute osseous abnormality. Note:  if pain or symptoms persist beyond reasonable expectations and follow-up imaging is anticipated,  cross sectional imaging  (CT and/or MRI) is suggested, as is deemed clinically appropriate. Electronically signed by:  Michelle Earl MD  8/13/2017 1:06 PM CDT Workstation: [id number]    Us Renal Complete    Result Date: 8/13/2017  Narrative: EXAMINATION:  ultrasound, retroperitoneal / renal     CLINICAL INDICATION:  Acute renal failure     HISTORY: COMPARISON: Renal ultrasound  8/9/14 , CT A/P 4/23/17   TECHNIQUE:  ultrasound, renal, standard protocol        FINDINGS:         Kidney, right:       size:  normal     echotexture:  Mildly hyperechoic, progressed          misc.:  Minimal/mild collecting system dilatation   Kidney, left:       size:  normal     echotexture:  Symmetric with the right        misc.:  Markedly dilated central collecting system, overall configuration unchanged from the prior CT    Urinary bladder:  grossly within normal limits         Vascular (visualized portions of - remainder obscured by overlying bowel gas):         Aorta:  normal caliber       IVC:  normal caliber, no intraluminal defect(s)    Misc:  .      Impression: CONCLUSION:  1.  Markedly  dilated left renal collecting system, overall configuration unchanged from the prior CT. Suggest retrograde urethrogram. 2. Newly appreciated mild right renal collecting system dilatation. 3. Images provided of the bladder grossly unremarkable. 4. Renal parenchymal echotexture greater than that of the liver implying the presence of chronic medical renal disease. Electronically signed by:  CURRY Lawler MD  8/13/2017 1:43 PM CDT Workstation: [id number]    Xr Chest 1 View    Result Date: 8/13/2017  Narrative: PROCEDURE: Single chest view AP REASON FOR EXAM:ams FINDINGS: Comparison exam dated October 30, 2016. Cardiac and pulmonary vasculature are normal. Left lung base small retrocardiac patchy opacity. Left upper lobe lingula small linear opacity. Lungs otherwise clear. Pleural spaces are normal. No acute osseous abnormality.     Impression: 1.  Left lung base small retrocardiac patchy opacities suspicious for early pneumonia versus subsegmental atelectasis. 2.  Left upper lobe lingula small focus of discoid atelectasis. Electronically signed by:  Star Jimenez MD  8/13/2017 10:10 AM CDT Workstation: [id number]    Us Venous Doppler Lower Extremity Left (duplex)    Result Date: 8/14/2017  Narrative: Patient Name:  MOODY CALVILLO Patient ID:  [id number] Ordering:  IJEOMA SPENCE Attending:  JERAD TRUONG Referring:  IJEOMA SPENCE ------------------------------------------------ Ultrasound venous duplex left lower extremity HISTORY: Left lower extremity and erythema. Duplex ultrasound of the deep venous system of the left lower extremity was performed Real-time images demonstrate normal compressibility without evidence of intraluminal thrombus. Doppler shows phasic flow and augmentation. Color Doppler also reveals venous patency.     Impression: CONCLUSION: No ultrasound evidence of deep venous thrombosis of the left lower extremity. [id number] Electronically signed by:  Parth Lewis MD  8/14/2017 5:18 PM CDT  Workstation: [id number]    Us Venous Doppler Lower Extremity Left (duplex)    Result Date: 8/13/2017  Narrative: Procedure: Left lower extremity venous ultrasound CLINICAL INDICATION: swelling/redness COMPARISON: None FINDINGS: The common femoral,  femoral,  popliteal and calf veins of the left  lower extremity are well identified and compress normally. Doppler signals are heard either spontaneously or on distal compression.  No evidence of intraluminal thrombus was noted.     Impression: CONCLUSION:  No evidence of deep venous thrombosis in the common femoral, femoral, popliteal and calf veins of the left  lower extremity. Electronically signed by:  Star Jimenez MD  8/13/2017 10:48 AM CDT Workstation: [id number]      HISTORY OF PRESENT ILLNESS   Rex Anthony is a 77 y.o. male was presented from the nursing home.  Patient was brought in because of lower extremity swelling and pain.  He was found to be confused at that time.  Patient was also found to have pneumonia on chest x-ray.  He had significant renal failure which was noted on the labs and was found to have sodium of 120.    DIAGNOSTIC DATA   Ultrasound venous lower extremity.  Ultrasound renal    HOSPITAL COURSE   Patient was admitted to the medical floor.  He was started on IV antibiotic.  During this course patient was also evaluated for his urinary retention was found to had removed his Casanova at the nursing home.  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  . fellow was consult it he placed urgent suprapubic Casanova catheter.  After which patient was started on IV antibiotic with significant improvement.  Patient was also started on IV antibiotic for his pneumonia as well.  After which he did very well.  Patient also had complain of left knee swelling the x-ray was done which showed patient has most likely cellulitis.  Patient was on antibiotic which also covered for cellulitis.  The following day he improved  significantly.  He was able to have a conversation and wake up upon calling his name.  Undue discharge patient is clinically stable once to go back to nursing home.  Patient denies any shortness of air, chest pain or abdominal pain.      Physical Exam   Constitutional: He is oriented to person, place, and time. He appears well-developed.   HENT:   Head: Normocephalic.   Eyes: EOM are normal. Pupils are equal, round, and reactive to light.   Neck: Normal range of motion.   Cardiovascular: Normal rate, regular rhythm and normal heart sounds.    Pulmonary/Chest: Effort normal and breath sounds normal.   Abdominal: Soft. Bowel sounds are normal.   Musculoskeletal: Normal range of motion.   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   Neurological: He is alert and oriented to person, place, and time.   Skin: Skin is warm.   Vitals reviewed.       DISCHARGE CONDITION   Stable    DISPOSITION   To Nursing home      DISCHARGE MEDICATIONS      Rex Anthony [facility] Medication Instructions YFN:[id number]    Printed on:08/18/17 1239   Medication Information                      albuterol (PROVENTIL HFA;VENTOLIN HFA) 108 (90 BASE) MCG/ACT inhaler  Inhale 2 puffs Every 4 (Four) Hours As Needed for Wheezing.             albuterol (PROVENTIL) (5 MG/ML) 0.5% nebulizer solution  Take 2.5 mg by nebulization Every 6 (Six) Hours As Needed for Wheezing.             ALPRAZolam (XANAX) 0.25 MG tablet  Take 0.25 mg by mouth Daily.             ALPRAZolam (XANAX) 0.5 MG tablet  Take 0.5 mg by mouth Daily.             Amino Acids-Protein Hydrolys (PRO-STAT AWC PO)  Take 30 mL by mouth Daily.             amLODIPine (NORVASC) 10 MG tablet  Take 10 mg by mouth Daily.             budesonide-formoterol (SYMBICORT) 160-4.5 MCG/ACT inhaler  Inhale 2 puffs 2 (Two) Times a Day.             CloNIDine (CATAPRES) 0.2 MG  tablet  Take 0.2 mg by mouth 1 (One) Time Per Week.             docusate sodium (COLACE) 150 MG/15ML liquid  Take 50 mg by mouth Daily.             dry mouth gel (BIOTENE ORALBALANCE) gel  Apply  to the mouth or throat 3 (Three) Times a Day As Needed for dry mouth.             fentaNYL (DURAGESIC) 25 MCG/HR patch  Place 1 patch on the skin Every 72 (Seventy-Two) Hours.             FLUoxetine (PROzac) 40 MG capsule  Take 40 mg by mouth 2 (Two) Times a Day.             furosemide (LASIX) 20 MG tablet  Take 20 mg by mouth 2 (Two) Times a Day.             HYDROcodone-acetaminophen (NORCO)  MG per tablet  Take 1 tablet by mouth Every 4 (Four) Hours As Needed for Moderate Pain (4-6).             Hydrocortisone (PATRICK'S MAGIC BUTT CREAM)  Apply 1 application topically 2 (Two) Times a Day.             levoFLOXacin (LEVAQUIN) 500 MG tablet  Take 1 tablet by mouth Daily.             magnesium hydroxide (MILK OF MAGNESIA) 400 MG/5ML suspension  Take  by mouth 2 (Two) Times a Day As Needed for Constipation.             nortriptyline (PAMELOR) 50 MG capsule  Take 50 mg by mouth Every Night.             omeprazole in sodium bicarbonate injection 8.4%  Take 10 mg by mouth Daily.             polyethylene glycol (MIRALAX) packet  Take 17 g by mouth Daily.             promethazine (PHENERGAN) 25 MG tablet  Take 25 mg by mouth Every 4 (Four) Hours.             Sodium Phosphates (FLEET ENEMA) 7-19 GM/118ML enema  Insert  into the rectum 1 (One) Time As Needed.             tamsulosin (FLOMAX) 0.4 MG capsule 24 hr capsule  Take 1 capsule by mouth Daily.                   INSTRUCTIONS   Activity:   Activity Instructions     Discharge Activity                     Diet:   Diet Instructions     Diet: Full Liquid, Tube Feeding; Thin Liquids, No Restrictions; Continuous; as previously tolerated at the nursing home.       Discharge Diet:   Full Liquid  Tube Feeding      Fluid Consistency:  Thin Liquids, No Restrictions   Feeding Type:   Continuous   Formula & Rate:  as previously tolerated at the nursing home.                 Special Instructions: Patient instructed to call M.D. or return to ED with worsening shortness of breath, chest pain, fever greater than 100.4°F or any other medical concerns.    FOLLOW UP   Follow-up Information     Follow up with Mingo Rodriguez MD Follow up in 3 day(s).    Specialty:  Family Medicine    Contact information:    [address] [id number] [phone]          Follow up with [facility] .    Specialties:  Skilled Nursing Facility, Intermediate Care Facility    Contact information:    [address] [facility] [address] [phone]        Follow up with Irma Reynaga MD Follow up in 4 day(s).    Specialty:  Nephrology    Contact information:    [address]  [facility] [address] [phone]          Follow up with Christian Crawford MD Follow up in 1 week(s).    Specialty:  Urology    Contact information:    [address]  [place] [id number] [phone]           PENDING TEST RESULTS AT DISCHARGE    Order Current Status    Urine Culture In process         TIME   Time: 30 minutes were spent planning this discharge.                      This document has been electronically signed by Jose Phillips MD on August 18, 2017 12:38 PM

## 2017-08-18 NOTE — PROGRESS NOTES
"   LOS: 5 days   Patient Care Team:  Mingo Rodriguez MD as PCP - General    Subjective     Subjective:  Symptoms:  Stable.  (Patient resting comfortably, no complaints. Urine clear today in Casanova's gravity bag. ).    Diet:  No vomiting.        History taken from: patient chart    Objective     Vital Signs  Temp:  [97.1 °F (36.2 °C)-99 °F (37.2 °C)] 98.4 °F (36.9 °C)  Heart Rate:  [81-95] 81  Resp:  [16-20] 18  BP: (128-136)/(60-72) 128/60    Objective:  General Appearance:  In no acute distress.    Vital signs: (most recent): Blood pressure 128/60, pulse 81, temperature 98.4 °F (36.9 °C), temperature source Oral, resp. rate 18, height 75\" (190.5 cm), weight 254 lb 11.2 oz (116 kg), SpO2 96 %.  Vital signs are normal.    Output: Producing urine (Casanova).    HEENT: Normal HEENT exam.    Lungs:  Normal respiratory rate and normal effort.  There are rales and decreased breath sounds.  No wheezes.    Heart: Normal rate.  S1 normal and S2 normal.  No murmur.   Chest: Symmetric chest wall expansion.   Abdomen: Abdomen is soft and non-distended.  Bowel sounds are normal.   There is no abdominal tenderness.     Extremities: Decreased range of motion.  (RIGHT AKA, minimal erythema at left knee)  Pulses: Distal pulses are intact.    Neurological: Patient is alert and oriented to person, place and time.    Pupils:  Pupils are equal, round, and reactive to light.    Skin:  Warm, dry and pale.  No cyanosis.             Results Review:    Lab Results (last 24 hours)     Procedure Component Value Units Date/Time    Vitamin B12 [[id number]]  (Abnormal) Collected:  08/17/17 0648    Specimen:  Blood Updated:  08/17/17 0856     Vitamin B-12 >1000 (H) pg/mL     Folate [[id number]]  (Normal) Collected:  08/17/17 0648    Specimen:  Blood Updated:  08/17/17 0856     Folate 16.20 ng/mL     Blood Culture [[id number]]  (Normal) Collected:  08/13/17 1055    Specimen:  Blood from Arm, Left Updated:  08/17/17 1201     Blood Culture No growth at 4 " days    Blood Culture [[id number]]  (Normal) Collected:  08/13/17 1007    Specimen:  Blood from Arm, Right Updated:  08/17/17 1301     Blood Culture No growth at 4 days    Vancomycin, Trough [[id number]]  (Normal) Collected:  08/17/17 1506    Specimen:  Blood Updated:  08/17/17 1604     Vancomycin Trough 14.83 mcg/mL     CBC & Differential [[id number]] Collected:  08/18/17 0517    Specimen:  Blood Updated:  08/18/17 0712    Narrative:       The following orders were created for panel order CBC & Differential.  Procedure                               Abnormality         Status                     ---------                               -----------         ------                     CBC Auto Differential[[id number]]        Abnormal            Final result                 Please view results for these tests on the individual orders.    CBC Auto Differential [[id number]]  (Abnormal) Collected:  08/18/17 0517    Specimen:  Blood Updated:  08/18/17 0712     WBC 7.29 10*3/mm3      RBC 3.95 (L) 10*6/mm3      Hemoglobin 10.3 (L) g/dL      Hematocrit 32.4 (L) %      MCV 82.0 fL      MCH 26.1 (L) pg      MCHC 31.8 g/dL      RDW 18.3 (H) %      RDW-SD 55.3 (H) fl      MPV 10.0 fL      Platelets 229 10*3/mm3      Neutrophil % 71.6 %      Lymphocyte % 15.5 %      Monocyte % 8.9 %      Eosinophil % 3.2 %      Basophil % 0.4 %      Immature Grans % 0.4 %      Neutrophils, Absolute 5.22 10*3/mm3      Lymphocytes, Absolute 1.13 10*3/mm3      Monocytes, Absolute 0.65 10*3/mm3      Eosinophils, Absolute 0.23 10*3/mm3      Basophils, Absolute 0.03 10*3/mm3      Immature Grans, Absolute 0.03 (H) 10*3/mm3     Basic Metabolic Panel [[id number]]  (Abnormal) Collected:  08/18/17 0517    Specimen:  Blood Updated:  08/18/17 0727     Glucose 92 mg/dL      BUN 40 (H) mg/dL      Creatinine 1.42 (H) mg/dL      Sodium 137 mmol/L      Potassium 4.0 mmol/L      Chloride 104 mmol/L      CO2 24.0 mmol/L      Calcium 9.2 mg/dL      eGFR Non  Amer 48  "mL/min/1.73      BUN/Creatinine Ratio 28.2 (H)     Anion Gap 9.0 mmol/L     Narrative:       The MDRD GFR formula is only valid for adults with stable renal function between ages 18 and 70.         Imaging Results (last 24 hours)     ** No results found for the last 24 hours. **           I reviewed the patient's new clinical results.  I reviewed the patient's new imaging results and agree with the interpretation.  I reviewed the patient's other test results and agree with the interpretation      Assessment/Plan     Active Problems:    Pneumonia of both lungs due to infectious organism    Acute cystitis    Acute kidney injury    Cellulitis of left knee    Hyponatremia      Assessment:  (POD #5 cystoscopy with catheter placement in OR, patient had UTI, urinary retention, severe bladder neck contracture, urine culture from OR showed \"mixed\", he is on Azactam #5 and Levaquin dose #3 on 8/17/17 for pneumonia, also on vancomycin. Resolving ELOISE, creatinine baseline 1.0, today it is 1.42,  8/13/17 renal ultrasound showed hydronephrosis left worse than right, left appears chronic. Some sediment from tubing.       history includes kidney and ureteric stones, history of bladder outlet obstruction, hydronephrosis left, history of urethral trauma in Sept. of 2015 after Casanova balloon was inflated in urethra.).     Plan:   Administer medications as ordered.   (Continue current care, no changes from Urology at present. Follow culture. ).       WENDY Oliver  08/18/17  8:37 AM      "

## 2017-08-18 NOTE — DISCHARGE INSTR - APPOINTMENTS
DR MEADOWS WILL FOLLOW PT AT NH (3DAYS)  DR VELEZ AUG 25,2017 AT 11:25  DR WALLS AUG 22,2017 AT 3:15

## 2017-09-14 PROBLEM — M25.462 EFFUSION OF LEFT KNEE: Status: ACTIVE | Noted: 2017-01-01

## 2017-09-14 PROBLEM — M25.562 PAIN, JOINT, KNEE, LEFT: Status: ACTIVE | Noted: 2017-01-01

## 2017-09-14 PROBLEM — L03.116 CELLULITIS OF KNEE, LEFT: Status: ACTIVE | Noted: 2017-01-01

## 2017-09-15 NOTE — PROGRESS NOTES
Rex Anthony is a 77 y.o. male   Primary provider:  Mingo Rodriguez MD       Chief Complaint   Patient presents with   • Left Knee - Establish Care       HISTORY OF PRESENT ILLNESS: Patient presents to office via stretcher from [facility] for complaints of left leg pain, swelling and redness. Onset about one month ago. No known injury. Hospitalized on 8/13/17 with cellulitis, pneumonia, UTI and hyponatremia and treated with IV Vancomycin. He has been treated at the NH with Levaquin PO and Rocephin IM. He has had an ultrasound of LLE, CT scan and x-rays. Pain is described as sharp and burning. Pain is increased with touch and movement of the LLE. Nothing improves the pain.     Leg Pain    Incident onset: about one month ago. The incident occurred at a nursing home. There was no injury mechanism. The pain is present in the left leg and left knee. The quality of the pain is described as burning and shooting. The pain is moderate. The pain has been constant since onset. Associated symptoms include numbness. He reports no foreign bodies present. The symptoms are aggravated by movement and palpation. Treatments tried: pain medication, antibiotics. The treatment provided no relief.        CONCURRENT MEDICAL HISTORY:    Past Medical History:   Diagnosis Date   • Agoraphobia with panic disorder    • Amputation of leg, right, traumatic    • Anemia    • Anxiety disorder    • Astigmatism    • Cataract    • Cellulitis of abdominal wall    • Cerebral atherosclerosis    • Chronic bronchitis    • Chronic ischemic heart disease, unspecified    • Chronic pain syndrome    • Chronic respiratory failure    • Constipation    • Contracture of hand joint    • COPD (chronic obstructive pulmonary disease)    • Cystitis    • Demyelinating disease of central nervous system    • Disturbances of salivary secretion    • Dysphagia    • Essential (primary) hypertension    • Gastrostomy malfunction    • Gastrostomy status     • GERD (gastroesophageal reflux disease)    • Hemiplegia and hemiparesis following unspecified cerebrovascular disease affecting left non-dominant side    • Hydronephrosis    • Hyperlipidemia    • Hypertensive chronic kidney disease    • Insomnia    • Major depressive disorder, single episode    • Mild cognitive impairment    • Obesity    • Obstructive sleep apnea    • Other specified cardiac arrhythmias    • Peripheral vascular disease    • Presbyopia    • Suicidal ideation    • Tinea unguium    • Unspecified sequelae of unspecified cerebrovascular disease    • Urinary calculi    • Urinary retention    • UTI (urinary tract infection)    • Vitreous degeneration        Allergies   Allergen Reactions   • Penicillins          Current Outpatient Prescriptions:   •  albuterol (PROVENTIL HFA;VENTOLIN HFA) 108 (90 BASE) MCG/ACT inhaler, Inhale 2 puffs Every 4 (Four) Hours As Needed for Wheezing., Disp: , Rfl:   •  albuterol (PROVENTIL) (5 MG/ML) 0.5% nebulizer solution, Take 2.5 mg by nebulization Every 6 (Six) Hours As Needed for Wheezing., Disp: , Rfl:   •  ALPRAZolam (XANAX) 0.25 MG tablet, Take 0.25 mg by mouth Daily., Disp: , Rfl:   •  ALPRAZolam (XANAX) 0.5 MG tablet, Take 0.5 mg by mouth Daily., Disp: , Rfl:   •  Amino Acids-Protein Hydrolys (PRO-STAT AWC PO), Take 30 mL by mouth Daily., Disp: , Rfl:   •  amLODIPine (NORVASC) 10 MG tablet, Take 10 mg by mouth Daily., Disp: , Rfl:   •  budesonide-formoterol (SYMBICORT) 160-4.5 MCG/ACT inhaler, Inhale 2 puffs 2 (Two) Times a Day., Disp: 1 inhaler, Rfl: 0  •  CloNIDine (CATAPRES) 0.2 MG tablet, Take 0.2 mg by mouth 1 (One) Time Per Week., Disp: , Rfl:   •  docusate sodium (COLACE) 150 MG/15ML liquid, Take 50 mg by mouth Daily., Disp: , Rfl:   •  dry mouth gel (BIOTENE ORALBALANCE) gel, Apply  to the mouth or throat 3 (Three) Times a Day As Needed for dry mouth., Disp: , Rfl:   •  fentaNYL (DURAGESIC) 25 MCG/HR patch, Place 1 patch on the skin Every 72 (Seventy-Two)  Hours., Disp: , Rfl:   •  FLUoxetine (PROzac) 40 MG capsule, Take 40 mg by mouth 2 (Two) Times a Day., Disp: , Rfl:   •  furosemide (LASIX) 20 MG tablet, Take 20 mg by mouth 2 (Two) Times a Day., Disp: , Rfl:   •  HYDROcodone-acetaminophen (NORCO)  MG per tablet, Take 1 tablet by mouth Every 4 (Four) Hours As Needed for Moderate Pain (4-6)., Disp: , Rfl:   •  Hydrocortisone (PATRICK'S MAGIC BUTT CREAM), Apply 1 application topically 2 (Two) Times a Day., Disp: , Rfl:   •  levoFLOXacin (LEVAQUIN) 500 MG tablet, Take 1 tablet by mouth Daily., Disp: 10 tablet, Rfl: 0  •  magnesium hydroxide (MILK OF MAGNESIA) 400 MG/5ML suspension, Take  by mouth 2 (Two) Times a Day As Needed for Constipation., Disp: , Rfl:   •  nortriptyline (PAMELOR) 50 MG capsule, Take 50 mg by mouth Every Night., Disp: , Rfl:   •  omeprazole in sodium bicarbonate injection 8.4%, Take 10 mg by mouth Daily., Disp: , Rfl:   •  polyethylene glycol (MIRALAX) packet, Take 17 g by mouth Daily., Disp: , Rfl:   •  promethazine (PHENERGAN) 25 MG tablet, Take 25 mg by mouth Every 4 (Four) Hours., Disp: , Rfl:   •  Sodium Phosphates (FLEET ENEMA) 7-19 GM/118ML enema, Insert  into the rectum 1 (One) Time As Needed., Disp: , Rfl:   •  tamsulosin (FLOMAX) 0.4 MG capsule 24 hr capsule, Take 1 capsule by mouth Daily., Disp: 30 capsule, Rfl: 0    Past Surgical History:   Procedure Laterality Date   • CYSTOSCOPY N/A 8/13/2017    Procedure: CYSTOSCOPY WITH CATHETER PLACEMENT;  Surgeon: Christian Crawford MD;  Location: [facility];  Service:        History reviewed. No pertinent family history.    Social History     Social History   • Marital status:      Spouse name: N/A   • Number of children: N/A   • Years of education: N/A     Occupational History   • Not on file.     Social History Main Topics   • Smoking status: Unknown If Ever Smoked   • Smokeless tobacco: Not on file   • Alcohol use No   • Drug use: No   • Sexual activity: Defer     Other Topics Concern    • Not on file     Social History Narrative        Review of Systems   Musculoskeletal: Positive for joint swelling.        Left leg pain. Left knee pain. Swelling left knee. Redness left leg.    Neurological: Positive for numbness.   Psychiatric/Behavioral: Positive for sleep disturbance.       PHYSICAL EXAMINATION:       There were no vitals taken for this visit.    Physical Exam   Constitutional: He is oriented to person, place, and time. Vital signs are normal. He appears well-developed and well-nourished.   HENT:   Head: Normocephalic.   Pulmonary/Chest: Effort normal. No respiratory distress.   Abdominal: Soft. He exhibits no distension.   Musculoskeletal: He exhibits edema (left knee, left leg).        Left knee: He exhibits effusion.   Neurological: He is alert and oriented to person, place, and time. GCS eye subscore is 4. GCS verbal subscore is 5. GCS motor subscore is 6.   Skin: Skin is warm, dry and intact. There is erythema (left knee, left leg).   Psychiatric: He has a normal mood and affect. His speech is normal and behavior is normal. Judgment and thought content normal. Cognition and memory are normal.   Vitals reviewed.      GAIT:     []  Normal  []  Antalgic    Assistive device: []  None  []  Walker     []  Crutches  []  Cane     []  Wheelchair  [x]  Stretcher    Right Knee Exam     Comments:  Status post right AKA.      Left Knee Exam     Tenderness   Left knee tenderness location: diffuse.    Range of Motion   Extension: abnormal   Flexion: abnormal     Other   Erythema: present  Scars: present (well-healed surgical scar superior to the patella, previous fracture repair per patient)  Sensation: normal  Pulse: present  Swelling: moderate  Effusion: effusion present    Comments:  Moderate erythema to LLE, not diffuse but scattered. Erythema and warmth to lateral aspect of left knee. Erythema to left anterior thigh, slightly medial. Erythema with abrasions to left anterior lower leg (shin).   Diffuse tenderness to left knee and leg. No ROM abilities, questionable contracture of the knee at a flexed position of about 15 degrees. Unable to fully extend knee or flex the knee either due to severe pain or contracture. Unable to manipulate knee for testing.             Ct Lower Extremity Left With Contrast    Result Date: 9/12/2017  Narrative: CT left leg with contrast on 9/12/2017 CLINICAL INDICATION: Left leg redness and swelling, question infection TECHNIQUE: Multiple axial images are obtained from the left pelvis through the left foot following the administration of IV contrast. Sagittal and coronal reformatted images are also performed and reviewed. This study was performed with techniques to keep radiation doses as low as reasonably achievable, (ALARA). Total DLP is 3147.7 mGy*cm. COMPARISON: None FINDINGS: The patient is status post a left total hip arthroplasty. There is lateral blade plate and multiple screw fixation also in the left proximal to mid femur with multiple cerclage wire fixation. Artifact from the hardware somewhat limits the examination. Casanova catheter extends into the bladder. There is bladder wall thickening with mild adjacent fat stranding raising a question of cystitis, please correlate with urinalysis. There is no adenopathy by CT size criteria. There is a moderate-sized joint effusion in the knee. There is expected synovial enhancement in the knee. No air is noted in the joint effusion to suggest definite infection but if there is high clinical concern for septic knee joint consider joint aspiration. There is all three compartment joint space narrowing in the knee with osteophyte formation consistent with changes of osteoarthritis. Vascular calcifications are noted. There is mild diffuse osteopenia. No definite CT changes of osteomyelitis are noted. There are no acute fracture lines. There are multiple chronic calcifications along the quadriceps tendon that may be related to an old  injury. No fluid collection to suggest abscess is noted. There is diffuse muscular atrophy in the left leg possibly related to disuse.     Impression: 1. Joint effusion in the knee with expected synovial enhancement but if there is clinical concern for infection in the left knee consider joint aspiration. 2. Mild bladder wall thickening with adjacent fat stranding, recommend correlation with urinalysis to exclude infection. 3. Changes of osteoarthritis in the left knee. Electronically signed by:  Ted Jo  9/12/2017 11:14 PM CDT Workstation: [id number]    Ultrasound venous duplex left lower extremity     HISTORY: Left lower extremity and erythema.     Duplex ultrasound of the deep venous system of the left lower  extremity was performed     Real-time images demonstrate normal compressibility without  evidence of intraluminal thrombus.  Doppler shows phasic flow and augmentation.  Color Doppler also reveals venous patency.     IMPRESSION:  CONCLUSION:  No ultrasound evidence of deep venous thrombosis of the left  lower extremity.     [id number]     Electronically signed by:  Parth Lewis MD  8/14/2017 5:18 PM CDT  Workstation: [id number]    PROCEDURE: XR KNEE 3 VW LEFT     VIEWS: 3     INDICATION: Cellulitis     COMPARISON: None     FINDINGS:     -No fracture identified. Diffusely decreased osseous  mineralization is present. Tricompartmental degenerative changes  are present. There is medial greater than lateral joint space  narrowing. There is heterotopic calcification or calcified  structures within the joint capsule in the suprapatellar space.  Subcutaneous stranding is seen at and superior to the level of  the knee, medial greater than lateral.     IMPRESSION:  CONCLUSION: Subcutaneous soft tissue stranding, probably  representing patient's reported cellulitis. No acute osseous  abnormality.     Note:  if pain or symptoms persist beyond reasonable expectations     and follow-up imaging is  anticipated,  cross sectional imaging   (CT and/or MRI) is suggested, as is deemed clinically   appropriate.     Electronically signed by:  Michelle Earl MD  8/13/2017 1:06 PM CDT  Workstation: [id number]      ASSESSMENT:    Diagnoses and all orders for this visit:    Cellulitis of knee, left  -     Sedimentation Rate; Future  -     CBC & Differential; Future  -     C-reactive Protein; Future  -     Uric Acid; Future  -     Body Fluid Culture; Future  -     Crystal Exam, Fluid; Future  -     Body Fluid Cell Count With Differential; Future  -     Crystal Exam, Fluid    Pain, joint, knee, left  -     Sedimentation Rate; Future  -     CBC & Differential; Future  -     C-reactive Protein; Future  -     Uric Acid; Future  -     Body Fluid Culture; Future  -     Crystal Exam, Fluid; Future  -     Body Fluid Cell Count With Differential; Future  -     Crystal Exam, Fluid    Effusion of left knee  -     Body Fluid Culture; Future  -     Crystal Exam, Fluid; Future  -     Body Fluid Cell Count With Differential; Future  -     Crystal Exam, Fluid      PLAN    Reviewed recent diagnostic studies of this patient's LLE today, including Ultrasound, CT scan and x-rays. In review, patient was hospitalized on 8/13/2017 with diagnoses of cellulitis of LLE, pneumonia, UTI and hyponatremia. He received IV Vancomycin while in the hospital. He was discharged back to the nursing home on 8/18/2017 on PO Levaquin. Patient continues to have severe pain, swelling, erythema and tenderness to the left knee and LLE. Today in office, I attempted to aspirate his left knee. There was only a small return of bloody fluid which was sent to the lab for analysis. I have also repeated inflammatory markers today and ordered a serum uric acid level. Differential diagnoses at this time are gout, septic joint and cellulitis.     Today's lab results:     WBC = 9.0  CRP = 6.9 (continuing to trend down from 14.10 on 9/6 and 20.8 on 8/28)  Sed rate = 125  Uric  Acid = 5.5    NOTE: Upon initial return of joint aspiration lab results, it is noted that there are no crystals found. Based on this and the normal uric acid level, gout can be ruled out. At this point, differential diagnoses are septic joint and cellulitis. Cultures of synovial fluid are pending. Further treatment will be determined when final lab results and synovial fluid cultures are received. Discharged back to the nursing home at this time to continue current plan of care.     Return in about 2 weeks (around 9/28/2017) for Recheck.    Emely Rhodes APRN

## 2017-09-21 NOTE — PROGRESS NOTES
Procedure   Procedures        Discussed this patient's case with Dr. Monge. Between all the diagnostic studies performed while hospitalized and by Dr. Rodriguez and myself, gout, DVT and osteomyelitis have been ruled out. Unlikely this is a septic joint as his CRP has been steadily declining without being on antibiotics continuously. However, as long as his CRP is still elevated, he should be on PO antibiotics. The culture from the knee aspiration grew +1 scant pseudomonas. It is also possible that the sample was contaminated. At this time, suspicion for septic joint remains low. More likely cellulitis. Plan to treat with PO antibiotics at this time and monitor serum CRP levels weekly to biweekly until it normalizes. Before prescribing PO antibiotics, I need recent labs so that I can calculate his creatinine clearance. His most recent renal functions are from about one month ago when he was acutely ill and hospitalized for cellulitis, pneumonia, hyponatremia and UTI. I suspect they have improved. I am sending orders today to the [facility] to have new labs drawn and will prescribe antibiotics when I have results.     I will ask that [facility] fax the lab results to our office when they are resulted.   Fax # [phone]    09/21/17 at 11:00 AM by WENDY Marx

## 2017-10-30 PROBLEM — L03.116 CELLULITIS OF LEG, LEFT: Status: ACTIVE | Noted: 2017-01-01

## 2017-10-30 NOTE — PROGRESS NOTES
Rex Anthony is a 77 y.o. male returns for     Chief Complaint   Patient presents with   • Left Leg - Follow-up       HISTORY OF PRESENT ILLNESS: Patient presents to office via stretcher from [facility] for recheck of left knee pain, swelling and redness.  Onset of symptoms approximately 10 weeks ago.  No known injury.  Patient was hospitalized on 8/13/2017 with cellulitis of the left lower extremity, pneumonia, UTI and hyponatremia.  He was treated with IV Vancomycin while hospitalized and DC'd back to the nursing home on PO Levaquin and IM Rocephin.  He has had multiple diagnostic studies of the LLE, including CT scan, x-rays, ultrasound and joint aspiration.  He has recently had increased pain and erythema to the left knee.  He had repeat labs which showed an increase in CRP.  He has received IV Tobramycin at the nursing home recently, which improved his pain and symptoms.     CONCURRENT MEDICAL HISTORY:    Past Medical History:   Diagnosis Date   • Agoraphobia with panic disorder    • Amputation of leg, right, traumatic    • Anemia    • Anxiety disorder    • Astigmatism    • Cataract    • Cellulitis of abdominal wall    • Cerebral atherosclerosis    • Chronic bronchitis    • Chronic ischemic heart disease, unspecified    • Chronic pain syndrome    • Chronic respiratory failure    • Constipation    • Contracture of hand joint    • COPD (chronic obstructive pulmonary disease)    • Cystitis    • Demyelinating disease of central nervous system    • Disturbances of salivary secretion    • Dysphagia    • Essential (primary) hypertension    • Gastrostomy malfunction    • Gastrostomy status    • GERD (gastroesophageal reflux disease)    • Hemiplegia and hemiparesis following unspecified cerebrovascular disease affecting left non-dominant side    • Hydronephrosis    • Hyperlipidemia    • Hypertensive chronic kidney disease    • Insomnia    • Major depressive disorder, single episode    • Mild  cognitive impairment    • Obesity    • Obstructive sleep apnea    • Other specified cardiac arrhythmias    • Peripheral vascular disease    • Presbyopia    • Suicidal ideation    • Tinea unguium    • Unspecified sequelae of unspecified cerebrovascular disease    • Urinary calculi    • Urinary retention    • UTI (urinary tract infection)    • Vitreous degeneration        Allergies   Allergen Reactions   • Penicillins          Current Outpatient Prescriptions:   •  albuterol (PROVENTIL HFA;VENTOLIN HFA) 108 (90 BASE) MCG/ACT inhaler, Inhale 2 puffs Every 4 (Four) Hours As Needed for Wheezing., Disp: , Rfl:   •  albuterol (PROVENTIL) (5 MG/ML) 0.5% nebulizer solution, Take 2.5 mg by nebulization Every 6 (Six) Hours As Needed for Wheezing., Disp: , Rfl:   •  ALPRAZolam (XANAX) 0.25 MG tablet, Take 0.25 mg by mouth Daily., Disp: , Rfl:   •  Amino Acids-Protein Hydrolys (PRO-STAT AWC) liquid, Take  by mouth., Disp: , Rfl:   •  amLODIPine (NORVASC) 10 MG tablet, Take 10 mg by mouth Daily., Disp: , Rfl:   •  budesonide-formoterol (SYMBICORT) 160-4.5 MCG/ACT inhaler, Inhale 2 puffs 2 (Two) Times a Day., Disp: 1 inhaler, Rfl: 0  •  CloNIDine (CATAPRES) 0.2 MG tablet, Take 0.2 mg by mouth 1 (One) Time Per Week., Disp: , Rfl:   •  docusate sodium (COLACE) 150 MG/15ML liquid, Take 50 mg by mouth Daily., Disp: , Rfl:   •  dry mouth gel (BIOTENE ORALBALANCE) gel, Apply  to the mouth or throat 3 (Three) Times a Day As Needed for dry mouth., Disp: , Rfl:   •  fentaNYL (DURAGESIC) 25 MCG/HR patch, Place 1 patch on the skin Every 72 (Seventy-Two) Hours., Disp: , Rfl:   •  FLUoxetine (PROzac) 40 MG capsule, Take 40 mg by mouth 2 (Two) Times a Day., Disp: , Rfl:   •  furosemide (LASIX) 20 MG tablet, Take 20 mg by mouth 2 (Two) Times a Day., Disp: , Rfl:   •  HYDROcodone-acetaminophen (NORCO)  MG per tablet, Take 1 tablet by mouth Every 4 (Four) Hours As Needed for Moderate Pain (4-6)., Disp: , Rfl:   •  Hydrocortisone (PATRICK'S  MAGIC BUTT CREAM), Apply 1 application topically 2 (Two) Times a Day., Disp: , Rfl:   •  magnesium hydroxide (MILK OF MAGNESIA) 400 MG/5ML suspension, Take  by mouth 2 (Two) Times a Day As Needed for Constipation., Disp: , Rfl:   •  nortriptyline (PAMELOR) 50 MG capsule, Take 50 mg by mouth Every Night., Disp: , Rfl:   •  omeprazole in sodium bicarbonate injection 8.4%, Take 10 mg by mouth Daily., Disp: , Rfl:   •  polyethylene glycol (MIRALAX) packet, Take 17 g by mouth Daily., Disp: , Rfl:   •  promethazine (PHENERGAN) 25 MG tablet, Take 25 mg by mouth Every 4 (Four) Hours., Disp: , Rfl:   •  Sodium Phosphates (FLEET ENEMA) 7-19 GM/118ML enema, Insert  into the rectum 1 (One) Time As Needed., Disp: , Rfl:   •  tamsulosin (FLOMAX) 0.4 MG capsule 24 hr capsule, Take 1 capsule by mouth Daily., Disp: 30 capsule, Rfl: 0    Past Surgical History:   Procedure Laterality Date   • CYSTOSCOPY N/A 8/13/2017    Procedure: CYSTOSCOPY WITH CATHETER PLACEMENT;  Surgeon: Christian Crawford MD;  Location: [facility];  Service:        ROS  No fevers or chills.  No chest pain or shortness of air.  No GI or  disturbances. Left knee pain.     PHYSICAL EXAMINATION:       There were no vitals taken for this visit.    Physical Exam   Musculoskeletal:        Left knee: He exhibits effusion.       GAIT:     []  Normal  []  Antalgic    Assistive device: []  None  []  Walker     []  Crutches  []  Cane     []  Wheelchair  [x]  Stretcher    Right Knee Exam     Comments:  Status post right AKA      Left Knee Exam     Tenderness   Left knee tenderness location: mild, diffuse.    Range of Motion   Extension: abnormal   Flexion: abnormal     Other   Erythema: present (mild)  Scars: present (well-healed surgical scar superior to the patella)  Sensation: normal  Pulse: present  Swelling: moderate  Effusion: effusion present    Comments:  Mild erythema present to knee, more notable to the medial aspect. Mild, diffuse tenderness to left knee and leg.  No ROM abilities with contracture of the knee at a flexed position of about 15 degrees.                Ct Lower Extremity Left With Contrast     Result Date: 9/12/2017  Narrative: CT left leg with contrast on 9/12/2017 CLINICAL INDICATION: Left leg redness and swelling, question infection TECHNIQUE: Multiple axial images are obtained from the left pelvis through the left foot following the administration of IV contrast. Sagittal and coronal reformatted images are also performed and reviewed. This study was performed with techniques to keep radiation doses as low as reasonably achievable, (ALARA). Total DLP is 3147.7 mGy*cm. COMPARISON: None FINDINGS: The patient is status post a left total hip arthroplasty. There is lateral blade plate and multiple screw fixation also in the left proximal to mid femur with multiple cerclage wire fixation. Artifact from the hardware somewhat limits the examination. Casanova catheter extends into the bladder. There is bladder wall thickening with mild adjacent fat stranding raising a question of cystitis, please correlate with urinalysis. There is no adenopathy by CT size criteria. There is a moderate-sized joint effusion in the knee. There is expected synovial enhancement in the knee. No air is noted in the joint effusion to suggest definite infection but if there is high clinical concern for septic knee joint consider joint aspiration. There is all three compartment joint space narrowing in the knee with osteophyte formation consistent with changes of osteoarthritis. Vascular calcifications are noted. There is mild diffuse osteopenia. No definite CT changes of osteomyelitis are noted. There are no acute fracture lines. There are multiple chronic calcifications along the quadriceps tendon that may be related to an old injury. No fluid collection to suggest abscess is noted. There is diffuse muscular atrophy in the left leg possibly related to disuse.      Impression: 1. Joint effusion  in the knee with expected synovial enhancement but if there is clinical concern for infection in the left knee consider joint aspiration. 2. Mild bladder wall thickening with adjacent fat stranding, recommend correlation with urinalysis to exclude infection. 3. Changes of osteoarthritis in the left knee. Electronically signed by:  Ted Jo  9/12/2017 11:14 PM CDT Workstation: [id number]     Ultrasound venous duplex left lower extremity      HISTORY: Left lower extremity and erythema.      Duplex ultrasound of the deep venous system of the left lower  extremity was performed      Real-time images demonstrate normal compressibility without  evidence of intraluminal thrombus.  Doppler shows phasic flow and augmentation.  Color Doppler also reveals venous patency.      IMPRESSION:  CONCLUSION:  No ultrasound evidence of deep venous thrombosis of the left  lower extremity.      [address]      Electronically signed by:  Parth Lewis MD  8/14/2017 5:18 PM CDT  Workstation: [id number]     PROCEDURE: XR KNEE 3 VW LEFT      VIEWS: 3      INDICATION: Cellulitis      COMPARISON: None      FINDINGS:     -No fracture identified. Diffusely decreased osseous  mineralization is present. Tricompartmental degenerative changes  are present. There is medial greater than lateral joint space  narrowing. There is heterotopic calcification or calcified  structures within the joint capsule in the suprapatellar space.  Subcutaneous stranding is seen at and superior to the level of  the knee, medial greater than lateral.      IMPRESSION:  CONCLUSION: Subcutaneous soft tissue stranding, probably  representing patient's reported cellulitis. No acute osseous  abnormality.      Note:  if pain or symptoms persist beyond reasonable expectations      and follow-up imaging is anticipated,  cross sectional imaging   (CT and/or MRI) is suggested, as is deemed clinically   appropriate.      Electronically signed by:  Michelle Earl MD   8/13/2017 1:06 PM CDT  Workstation: [id number]        ASSESSMENT:    Diagnoses and all orders for this visit:    Cellulitis of knee, left    PLAN     Last office visit was on 9/14/2017.  At that time, we aspirated the left knee to check for crystals and bacteria.  The culture from that aspiration did result in +1 scant pseudomonas, which I suspect was a sample contamination.  Discussed the case with Dr. Monge at last office visit.  As of that visit, suspicion was low for septic joint as his CRP was gradually trending downward, without being on oral antibiotics at the time.  Lab results from last office visit on 9/14/2017 include the following:     WBC = 9.0  CRP = 6.9 (continuing to trend down from 14.10 on 9/6 and 20.8 on 8/28)  Sed rate = 125  Uric Acid = 5.5    Since the onset of left knee pain and erythema, DVT has been ruled out with ultrasound.  Gout has been ruled out with synovial fluid aspiration that was negative for crystals and a normal serum uric acid level done at last office visit.  Remaining differential diagnoses include cellulitis of left knee/LLE and septic joint.  Suspicion for septic joint remains low at this time.  Presentation is most consistent with cellulitis.  It appears from the most recent labs ordered per Dr. Rodriguez that the CRP had elevated again (CRP = 14.5 on 10/20/2017) and the patient was given IV Tobramycin as noted per his peaks and troughs.  At this time, I will defer medical treatment of cellulitis to Dr. Rodriguez as he seems to be managing this.  I would recommended that the patient remain on PO antibiotics as long as he has an elevated CRP.  I do not see any PO antibiotics on his med list was sent with the patient today.  Recommend repeating CRP every 2 weeks to monitor trending. Follow up with orthopedics as needed.     Return if symptoms worsen or fail to improve.      This document has been electronically signed by WENDY Marx on October 30, 2017 4:39 PM       Emely Rhodes, APRN

## 2018-01-01 ENCOUNTER — APPOINTMENT (OUTPATIENT)
Dept: GENERAL RADIOLOGY | Facility: HOSPITAL | Age: 78
End: 2018-01-01

## 2018-01-01 ENCOUNTER — APPOINTMENT (OUTPATIENT)
Dept: ULTRASOUND IMAGING | Facility: HOSPITAL | Age: 78
End: 2018-01-01

## 2018-01-01 ENCOUNTER — APPOINTMENT (OUTPATIENT)
Dept: INTERVENTIONAL RADIOLOGY/VASCULAR | Facility: HOSPITAL | Age: 78
End: 2018-01-01

## 2018-01-01 ENCOUNTER — HOSPITAL ENCOUNTER (INPATIENT)
Facility: HOSPITAL | Age: 78
LOS: 3 days | End: 2018-02-05
Attending: FAMILY MEDICINE | Admitting: INTERNAL MEDICINE

## 2018-01-01 ENCOUNTER — LAB REQUISITION (OUTPATIENT)
Dept: LAB | Facility: HOSPITAL | Age: 78
End: 2018-01-01

## 2018-01-01 VITALS
HEART RATE: 117 BPM | BODY MASS INDEX: 27.85 KG/M2 | TEMPERATURE: 104.4 F | HEIGHT: 75 IN | DIASTOLIC BLOOD PRESSURE: 48 MMHG | RESPIRATION RATE: 32 BRPM | OXYGEN SATURATION: 66 % | SYSTOLIC BLOOD PRESSURE: 78 MMHG | WEIGHT: 223.99 LBS

## 2018-01-01 DIAGNOSIS — A41.9 SEPSIS, DUE TO UNSPECIFIED ORGANISM: Primary | ICD-10-CM

## 2018-01-01 DIAGNOSIS — I95.9 HYPOTENSION, UNSPECIFIED HYPOTENSION TYPE: ICD-10-CM

## 2018-01-01 DIAGNOSIS — R06.89 OTHER ABNORMALITIES OF BREATHING: ICD-10-CM

## 2018-01-01 DIAGNOSIS — N17.9 ACUTE RENAL FAILURE, UNSPECIFIED ACUTE RENAL FAILURE TYPE (HCC): ICD-10-CM

## 2018-01-01 DIAGNOSIS — N39.0 URINARY TRACT INFECTION WITH HEMATURIA, SITE UNSPECIFIED: ICD-10-CM

## 2018-01-01 DIAGNOSIS — R31.9 URINARY TRACT INFECTION WITH HEMATURIA, SITE UNSPECIFIED: ICD-10-CM

## 2018-01-01 LAB
ALBUMIN SERPL-MCNC: 3 G/DL (ref 3.4–4.8)
ALBUMIN SERPL-MCNC: 3.5 G/DL (ref 3.4–4.8)
ALBUMIN/GLOB SERPL: 0.9 G/DL (ref 1.1–1.8)
ALBUMIN/GLOB SERPL: 0.9 G/DL (ref 1.1–1.8)
ALP SERPL-CCNC: 190 U/L (ref 38–126)
ALP SERPL-CCNC: 272 U/L (ref 38–126)
ALT SERPL W P-5'-P-CCNC: 43 U/L (ref 21–72)
ALT SERPL W P-5'-P-CCNC: 49 U/L (ref 21–72)
ANION GAP SERPL CALCULATED.3IONS-SCNC: 10 MMOL/L (ref 5–15)
ANION GAP SERPL CALCULATED.3IONS-SCNC: 15 MMOL/L (ref 5–15)
ANISOCYTOSIS BLD QL: NORMAL
APTT PPP: 42.6 SECONDS (ref 20–40.3)
ARTERIAL PATENCY WRIST A: ABNORMAL
AST SERPL-CCNC: 55 U/L (ref 17–59)
AST SERPL-CCNC: 65 U/L (ref 17–59)
ATMOSPHERIC PRESS: ABNORMAL MMHG
BACTERIA BLD CULT: ABNORMAL
BACTERIA SPEC AEROBE CULT: ABNORMAL
BACTERIA UR QL AUTO: ABNORMAL /HPF
BASE EXCESS BLDA CALC-SCNC: -4.9 MMOL/L (ref -2.4–2.4)
BASOPHILS # BLD AUTO: 0.01 10*3/MM3 (ref 0–0.2)
BASOPHILS # BLD AUTO: 0.03 10*3/MM3 (ref 0–0.2)
BASOPHILS NFR BLD AUTO: 0.1 % (ref 0–2)
BASOPHILS NFR BLD AUTO: 0.1 % (ref 0–2)
BDY SITE: ABNORMAL
BILIRUB SERPL-MCNC: 0.8 MG/DL (ref 0.2–1.3)
BILIRUB SERPL-MCNC: 1.5 MG/DL (ref 0.2–1.3)
BILIRUB UR QL STRIP: NEGATIVE
BUN BLD-MCNC: 50 MG/DL (ref 7–21)
BUN BLD-MCNC: 59 MG/DL (ref 7–21)
BUN/CREAT SERPL: 16.5 (ref 7–25)
BUN/CREAT SERPL: 19 (ref 7–25)
CA-I BLD-MCNC: 4.8 MG/DL (ref 4.5–4.9)
CA-I BLD-MCNC: 4.9 MG/DL (ref 4.5–4.9)
CALCIUM SPEC-SCNC: 10.1 MG/DL (ref 8.4–10.2)
CALCIUM SPEC-SCNC: 8.7 MG/DL (ref 8.4–10.2)
CHLORIDE SERPL-SCNC: 100 MMOL/L (ref 95–110)
CHLORIDE SERPL-SCNC: 112 MMOL/L (ref 95–110)
CLARITY UR: ABNORMAL
CO2 BLDA-SCNC: 20.3 MMOL/L (ref 23–27)
CO2 SERPL-SCNC: 20 MMOL/L (ref 22–31)
CO2 SERPL-SCNC: 21 MMOL/L (ref 22–31)
COLOR UR: YELLOW
CREAT BLD-MCNC: 3.03 MG/DL (ref 0.7–1.3)
CREAT BLD-MCNC: 3.11 MG/DL (ref 0.7–1.3)
CRP SERPL-MCNC: 26 MG/DL (ref 0–1)
D-LACTATE SERPL-SCNC: 2.4 MMOL/L (ref 0.5–2)
D-LACTATE SERPL-SCNC: 3.3 MMOL/L (ref 0.5–2)
D-LACTATE SERPL-SCNC: 4.1 MMOL/L (ref 0.5–2)
D-LACTATE SERPL-SCNC: 5.7 MMOL/L (ref 0.5–2)
DACRYOCYTES BLD QL SMEAR: NORMAL
DEPRECATED RDW RBC AUTO: 61 FL (ref 35.1–43.9)
DEPRECATED RDW RBC AUTO: 66.3 FL (ref 35.1–43.9)
EOSINOPHIL # BLD AUTO: 0 10*3/MM3 (ref 0–0.7)
EOSINOPHIL # BLD AUTO: 0 10*3/MM3 (ref 0–0.7)
EOSINOPHIL NFR BLD AUTO: 0 % (ref 0–7)
EOSINOPHIL NFR BLD AUTO: 0 % (ref 0–7)
ERYTHROCYTE [DISTWIDTH] IN BLOOD BY AUTOMATED COUNT: 19.3 % (ref 11.5–14.5)
ERYTHROCYTE [DISTWIDTH] IN BLOOD BY AUTOMATED COUNT: 20 % (ref 11.5–14.5)
FLUAV AG NPH QL: NEGATIVE
FLUAV AG NPH QL: NEGATIVE
FLUBV AG NPH QL IA: NEGATIVE
FLUBV AG NPH QL IA: NEGATIVE
GFR SERPL CREATININE-BSD FRML MDRD: 19 ML/MIN/1.73 (ref 60–98)
GFR SERPL CREATININE-BSD FRML MDRD: 20 ML/MIN/1.73 (ref 42–98)
GLOBULIN UR ELPH-MCNC: 3.5 GM/DL (ref 2.3–3.5)
GLOBULIN UR ELPH-MCNC: 3.8 GM/DL (ref 2.3–3.5)
GLUCOSE BLD-MCNC: 47 MG/DL (ref 60–100)
GLUCOSE BLD-MCNC: 68 MG/DL (ref 60–100)
GLUCOSE BLDA-MCNC: 73 MMOL/L
GLUCOSE UR STRIP-MCNC: NEGATIVE MG/DL
GRAM STN SPEC: ABNORMAL
GRAM STN SPEC: ABNORMAL
HCO3 BLDA-SCNC: 19.3 MMOL/L (ref 22–26)
HCT VFR BLD AUTO: 34.1 % (ref 39–49)
HCT VFR BLD AUTO: 42.9 % (ref 39–49)
HCT VFR BLD CALC: 34 % (ref 40–54)
HGB BLD-MCNC: 10.7 G/DL (ref 13.7–17.3)
HGB BLD-MCNC: 13.7 G/DL (ref 13.7–17.3)
HGB BLDA-MCNC: 11.7 G/DL (ref 14–18)
HGB UR QL STRIP.AUTO: ABNORMAL
HOLD SPECIMEN: NORMAL
HYALINE CASTS UR QL AUTO: ABNORMAL /LPF
IMM GRANULOCYTES # BLD: 0.47 10*3/MM3 (ref 0–0.02)
IMM GRANULOCYTES # BLD: 0.68 10*3/MM3 (ref 0–0.02)
IMM GRANULOCYTES NFR BLD: 2.8 % (ref 0–0.5)
IMM GRANULOCYTES NFR BLD: 2.8 % (ref 0–0.5)
INR PPP: 1.22 (ref 0.8–1.2)
ISOLATED FROM: ABNORMAL
KETONES UR QL STRIP: NEGATIVE
LEUKOCYTE ESTERASE UR QL STRIP.AUTO: ABNORMAL
LYMPHOCYTES # BLD AUTO: 0.61 10*3/MM3 (ref 0.6–4.2)
LYMPHOCYTES # BLD AUTO: 0.76 10*3/MM3 (ref 0.6–4.2)
LYMPHOCYTES NFR BLD AUTO: 3.1 % (ref 10–50)
LYMPHOCYTES NFR BLD AUTO: 3.6 % (ref 10–50)
MAGNESIUM SERPL-MCNC: 2.7 MG/DL (ref 1.6–2.3)
MCH RBC QN AUTO: 27.2 PG (ref 26.5–34)
MCH RBC QN AUTO: 27.9 PG (ref 26.5–34)
MCHC RBC AUTO-ENTMCNC: 31.4 G/DL (ref 31.5–36.3)
MCHC RBC AUTO-ENTMCNC: 31.9 G/DL (ref 31.5–36.3)
MCV RBC AUTO: 85.1 FL (ref 80–98)
MCV RBC AUTO: 88.8 FL (ref 80–98)
MODALITY: ABNORMAL
MONOCYTES # BLD AUTO: 1.29 10*3/MM3 (ref 0–0.9)
MONOCYTES # BLD AUTO: 1.61 10*3/MM3 (ref 0–0.9)
MONOCYTES NFR BLD AUTO: 6.5 % (ref 0–12)
MONOCYTES NFR BLD AUTO: 7.6 % (ref 0–12)
NEUTROPHILS # BLD AUTO: 14.67 10*3/MM3 (ref 2–8.6)
NEUTROPHILS # BLD AUTO: 21.53 10*3/MM3 (ref 2–8.6)
NEUTROPHILS NFR BLD AUTO: 85.9 % (ref 37–80)
NEUTROPHILS NFR BLD AUTO: 87.5 % (ref 37–80)
NITRITE UR QL STRIP: NEGATIVE
NRBC BLD MANUAL-RTO: 0 /100 WBC (ref 0–0)
PCO2 BLDA: 33 MM HG (ref 35–45)
PH BLDA: 7.38 PH UNITS (ref 7.35–7.45)
PH UR STRIP.AUTO: 7 [PH] (ref 5–9)
PHOSPHATE SERPL-MCNC: 1.4 MG/DL (ref 2.4–4.4)
PHOSPHATE SERPL-MCNC: 2.2 MG/DL (ref 2.4–4.4)
PLATELET # BLD AUTO: 128 10*3/MM3 (ref 150–450)
PLATELET # BLD AUTO: 69 10*3/MM3 (ref 150–450)
PMV BLD AUTO: ABNORMAL FL (ref 8–12)
PO2 BLDA: 68.4 MM HG (ref 80–105)
POIKILOCYTOSIS BLD QL SMEAR: NORMAL
POTASSIUM BLD-SCNC: 4 MMOL/L (ref 3.5–5.1)
POTASSIUM BLD-SCNC: 4.3 MMOL/L (ref 3.5–5.1)
POTASSIUM BLDA-SCNC: 3.83 MMOL/L (ref 3.6–4.9)
PROT SERPL-MCNC: 6.5 G/DL (ref 6.3–8.6)
PROT SERPL-MCNC: 7.3 G/DL (ref 6.3–8.6)
PROT UR QL STRIP: ABNORMAL
PROTHROMBIN TIME: 15.4 SECONDS (ref 11.1–15.3)
RBC # BLD AUTO: 3.84 10*6/MM3 (ref 4.37–5.74)
RBC # BLD AUTO: 5.04 10*6/MM3 (ref 4.37–5.74)
RBC # UR: ABNORMAL /HPF
REF LAB TEST METHOD: ABNORMAL
SAO2 % BLDCOA: 93.2 %
SMALL PLATELETS BLD QL SMEAR: NORMAL
SMALL PLATELETS BLD QL SMEAR: NORMAL
SODIUM BLD-SCNC: 136 MMOL/L (ref 137–145)
SODIUM BLD-SCNC: 142 MMOL/L (ref 137–145)
SODIUM BLDA-SCNC: 139.2 MMOL/L (ref 138–146)
SP GR UR STRIP: 1.01 (ref 1–1.03)
SQUAMOUS #/AREA URNS HPF: ABNORMAL /HPF
UROBILINOGEN UR QL STRIP: ABNORMAL
WBC MORPH BLD: NORMAL
WBC MORPH BLD: NORMAL
WBC NRBC COR # BLD: 17.05 10*3/MM3 (ref 3.2–9.8)
WBC NRBC COR # BLD: 24.61 10*3/MM3 (ref 3.2–9.8)
WBC UR QL AUTO: ABNORMAL /HPF
WHOLE BLOOD HOLD SPECIMEN: NORMAL
WHOLE BLOOD HOLD SPECIMEN: NORMAL

## 2018-01-01 PROCEDURE — 94799 UNLISTED PULMONARY SVC/PX: CPT

## 2018-01-01 PROCEDURE — 71045 X-RAY EXAM CHEST 1 VIEW: CPT

## 2018-01-01 PROCEDURE — 93010 ELECTROCARDIOGRAM REPORT: CPT | Performed by: INTERNAL MEDICINE

## 2018-01-01 PROCEDURE — B548ZZA ULTRASONOGRAPHY OF SUPERIOR VENA CAVA, GUIDANCE: ICD-10-PCS | Performed by: INTERNAL MEDICINE

## 2018-01-01 PROCEDURE — 25010000002 ONDANSETRON PER 1 MG: Performed by: FAMILY MEDICINE

## 2018-01-01 PROCEDURE — 25010000002 HEPARIN (PORCINE) PER 1000 UNITS: Performed by: INTERNAL MEDICINE

## 2018-01-01 PROCEDURE — 83735 ASSAY OF MAGNESIUM: CPT | Performed by: FAMILY MEDICINE

## 2018-01-01 PROCEDURE — 94640 AIRWAY INHALATION TREATMENT: CPT

## 2018-01-01 PROCEDURE — 85730 THROMBOPLASTIN TIME PARTIAL: CPT | Performed by: FAMILY MEDICINE

## 2018-01-01 PROCEDURE — 83605 ASSAY OF LACTIC ACID: CPT | Performed by: FAMILY MEDICINE

## 2018-01-01 PROCEDURE — 94760 N-INVAS EAR/PLS OXIMETRY 1: CPT

## 2018-01-01 PROCEDURE — 25010000002 MORPHINE PER 10 MG: Performed by: FAMILY MEDICINE

## 2018-01-01 PROCEDURE — 84100 ASSAY OF PHOSPHORUS: CPT | Performed by: INTERNAL MEDICINE

## 2018-01-01 PROCEDURE — 85025 COMPLETE CBC W/AUTO DIFF WBC: CPT | Performed by: INTERNAL MEDICINE

## 2018-01-01 PROCEDURE — 82803 BLOOD GASES ANY COMBINATION: CPT | Performed by: FAMILY MEDICINE

## 2018-01-01 PROCEDURE — 87150 DNA/RNA AMPLIFIED PROBE: CPT | Performed by: FAMILY MEDICINE

## 2018-01-01 PROCEDURE — 85007 BL SMEAR W/DIFF WBC COUNT: CPT | Performed by: FAMILY MEDICINE

## 2018-01-01 PROCEDURE — 87804 INFLUENZA ASSAY W/OPTIC: CPT | Performed by: FAMILY MEDICINE

## 2018-01-01 PROCEDURE — 86140 C-REACTIVE PROTEIN: CPT | Performed by: FAMILY MEDICINE

## 2018-01-01 PROCEDURE — 87040 BLOOD CULTURE FOR BACTERIA: CPT | Performed by: FAMILY MEDICINE

## 2018-01-01 PROCEDURE — 25010000002 ALBUMIN HUMAN 5% PER 50 ML: Performed by: INTERNAL MEDICINE

## 2018-01-01 PROCEDURE — 81001 URINALYSIS AUTO W/SCOPE: CPT | Performed by: FAMILY MEDICINE

## 2018-01-01 PROCEDURE — 25010000002 LORAZEPAM PER 2 MG: Performed by: FAMILY MEDICINE

## 2018-01-01 PROCEDURE — 83605 ASSAY OF LACTIC ACID: CPT | Performed by: INTERNAL MEDICINE

## 2018-01-01 PROCEDURE — 02HV33Z INSERTION OF INFUSION DEVICE INTO SUPERIOR VENA CAVA, PERCUTANEOUS APPROACH: ICD-10-PCS | Performed by: INTERNAL MEDICINE

## 2018-01-01 PROCEDURE — 84100 ASSAY OF PHOSPHORUS: CPT | Performed by: FAMILY MEDICINE

## 2018-01-01 PROCEDURE — 76937 US GUIDE VASCULAR ACCESS: CPT

## 2018-01-01 PROCEDURE — 80053 COMPREHEN METABOLIC PANEL: CPT | Performed by: INTERNAL MEDICINE

## 2018-01-01 PROCEDURE — 25010000002 VANCOMYCIN PER 500 MG: Performed by: FAMILY MEDICINE

## 2018-01-01 PROCEDURE — 85610 PROTHROMBIN TIME: CPT | Performed by: FAMILY MEDICINE

## 2018-01-01 PROCEDURE — 87086 URINE CULTURE/COLONY COUNT: CPT | Performed by: FAMILY MEDICINE

## 2018-01-01 PROCEDURE — 93005 ELECTROCARDIOGRAM TRACING: CPT | Performed by: FAMILY MEDICINE

## 2018-01-01 PROCEDURE — 87186 SC STD MICRODIL/AGAR DIL: CPT | Performed by: FAMILY MEDICINE

## 2018-01-01 PROCEDURE — 80053 COMPREHEN METABOLIC PANEL: CPT | Performed by: FAMILY MEDICINE

## 2018-01-01 PROCEDURE — 85007 BL SMEAR W/DIFF WBC COUNT: CPT | Performed by: INTERNAL MEDICINE

## 2018-01-01 PROCEDURE — C1751 CATH, INF, PER/CENT/MIDLINE: HCPCS

## 2018-01-01 PROCEDURE — 99285 EMERGENCY DEPT VISIT HI MDM: CPT

## 2018-01-01 PROCEDURE — 76775 US EXAM ABDO BACK WALL LIM: CPT

## 2018-01-01 PROCEDURE — 25010000002 MORPHINE PER 10 MG: Performed by: INTERNAL MEDICINE

## 2018-01-01 PROCEDURE — 87077 CULTURE AEROBIC IDENTIFY: CPT | Performed by: FAMILY MEDICINE

## 2018-01-01 PROCEDURE — 82330 ASSAY OF CALCIUM: CPT | Performed by: FAMILY MEDICINE

## 2018-01-01 PROCEDURE — 85025 COMPLETE CBC W/AUTO DIFF WBC: CPT | Performed by: FAMILY MEDICINE

## 2018-01-01 PROCEDURE — P9041 ALBUMIN (HUMAN),5%, 50ML: HCPCS | Performed by: INTERNAL MEDICINE

## 2018-01-01 RX ORDER — ONDANSETRON 2 MG/ML
4 INJECTION INTRAMUSCULAR; INTRAVENOUS ONCE
Status: COMPLETED | OUTPATIENT
Start: 2018-01-01 | End: 2018-01-01

## 2018-01-01 RX ORDER — DEXTROSE MONOHYDRATE 25 G/50ML
50 INJECTION, SOLUTION INTRAVENOUS
Status: DISCONTINUED | OUTPATIENT
Start: 2018-01-01 | End: 2018-01-01

## 2018-01-01 RX ORDER — SULFAMETHOXAZOLE AND TRIMETHOPRIM 800; 160 MG/1; MG/1
1 TABLET ORAL 2 TIMES DAILY
COMMUNITY

## 2018-01-01 RX ORDER — ACETAMINOPHEN 325 MG/1
650 TABLET ORAL EVERY 6 HOURS PRN
Status: DISCONTINUED | OUTPATIENT
Start: 2018-01-01 | End: 2018-01-01 | Stop reason: HOSPADM

## 2018-01-01 RX ORDER — MORPHINE SULFATE 2 MG/ML
1 INJECTION, SOLUTION INTRAMUSCULAR; INTRAVENOUS
Status: DISCONTINUED | OUTPATIENT
Start: 2018-01-01 | End: 2018-01-01 | Stop reason: HOSPADM

## 2018-01-01 RX ORDER — HEPARIN SODIUM 5000 [USP'U]/ML
5000 INJECTION, SOLUTION INTRAVENOUS; SUBCUTANEOUS EVERY 12 HOURS SCHEDULED
Status: DISCONTINUED | OUTPATIENT
Start: 2018-01-01 | End: 2018-01-01

## 2018-01-01 RX ORDER — LORAZEPAM 2 MG/ML
0.5 INJECTION INTRAMUSCULAR EVERY 4 HOURS PRN
Status: DISCONTINUED | OUTPATIENT
Start: 2018-01-01 | End: 2018-01-01 | Stop reason: HOSPADM

## 2018-01-01 RX ORDER — DULOXETIN HYDROCHLORIDE 60 MG/1
60 CAPSULE, DELAYED RELEASE ORAL DAILY
COMMUNITY

## 2018-01-01 RX ORDER — MORPHINE SULFATE 2 MG/ML
1 INJECTION, SOLUTION INTRAMUSCULAR; INTRAVENOUS EVERY 4 HOURS PRN
Status: DISCONTINUED | OUTPATIENT
Start: 2018-01-01 | End: 2018-01-01

## 2018-01-01 RX ORDER — SODIUM CHLORIDE 0.9 % (FLUSH) 0.9 %
1-10 SYRINGE (ML) INJECTION AS NEEDED
Status: DISCONTINUED | OUTPATIENT
Start: 2018-01-01 | End: 2018-01-01 | Stop reason: HOSPADM

## 2018-01-01 RX ORDER — ACETAMINOPHEN 650 MG/1
650 SUPPOSITORY RECTAL EVERY 6 HOURS PRN
Status: DISCONTINUED | OUTPATIENT
Start: 2018-01-01 | End: 2018-01-01 | Stop reason: HOSPADM

## 2018-01-01 RX ORDER — SCOLOPAMINE TRANSDERMAL SYSTEM 1 MG/1
1 PATCH, EXTENDED RELEASE TRANSDERMAL
Status: DISCONTINUED | OUTPATIENT
Start: 2018-01-01 | End: 2018-01-01 | Stop reason: HOSPADM

## 2018-01-01 RX ORDER — IPRATROPIUM BROMIDE AND ALBUTEROL SULFATE 2.5; .5 MG/3ML; MG/3ML
3 SOLUTION RESPIRATORY (INHALATION)
Status: DISCONTINUED | OUTPATIENT
Start: 2018-01-01 | End: 2018-01-01

## 2018-01-01 RX ORDER — ATROPINE SULFATE 10 MG/ML
2 SOLUTION/ DROPS OPHTHALMIC
Status: DISCONTINUED | OUTPATIENT
Start: 2018-01-01 | End: 2018-01-01 | Stop reason: HOSPADM

## 2018-01-01 RX ORDER — SODIUM CHLORIDE 450 MG/100ML
100 INJECTION, SOLUTION INTRAVENOUS CONTINUOUS
Status: DISCONTINUED | OUTPATIENT
Start: 2018-01-01 | End: 2018-01-01

## 2018-01-01 RX ORDER — SODIUM CHLORIDE 9 MG/ML
INJECTION, SOLUTION INTRAVENOUS
Status: COMPLETED
Start: 2018-01-01 | End: 2018-01-01

## 2018-01-01 RX ORDER — SODIUM CHLORIDE 0.9 % (FLUSH) 0.9 %
10 SYRINGE (ML) INJECTION AS NEEDED
Status: DISCONTINUED | OUTPATIENT
Start: 2018-01-01 | End: 2018-01-01 | Stop reason: HOSPADM

## 2018-01-01 RX ORDER — BUDESONIDE AND FORMOTEROL FUMARATE DIHYDRATE 160; 4.5 UG/1; UG/1
2 AEROSOL RESPIRATORY (INHALATION)
Status: DISCONTINUED | OUTPATIENT
Start: 2018-01-01 | End: 2018-01-01

## 2018-01-01 RX ORDER — SODIUM CHLORIDE 9 MG/ML
150 INJECTION, SOLUTION INTRAVENOUS CONTINUOUS
Status: DISCONTINUED | OUTPATIENT
Start: 2018-01-01 | End: 2018-01-01

## 2018-01-01 RX ORDER — ALBUMIN, HUMAN INJ 5% 5 %
500 SOLUTION INTRAVENOUS ONCE
Status: COMPLETED | OUTPATIENT
Start: 2018-01-01 | End: 2018-01-01

## 2018-01-01 RX ADMIN — SODIUM CHLORIDE 1000 ML: 900 INJECTION, SOLUTION INTRAVENOUS at 09:58

## 2018-01-01 RX ADMIN — LORAZEPAM 0.5 MG: 2 INJECTION INTRAMUSCULAR; INTRAVENOUS at 22:48

## 2018-01-01 RX ADMIN — MORPHINE SULFATE 1 MG: 2 INJECTION, SOLUTION INTRAMUSCULAR; INTRAVENOUS at 07:53

## 2018-01-01 RX ADMIN — SODIUM CHLORIDE 1 G: 9 INJECTION INTRAMUSCULAR; INTRAVENOUS; SUBCUTANEOUS at 20:08

## 2018-01-01 RX ADMIN — MORPHINE SULFATE 1 MG: 2 INJECTION, SOLUTION INTRAMUSCULAR; INTRAVENOUS at 22:48

## 2018-01-01 RX ADMIN — ALBUMIN HUMAN 500 ML: 0.05 INJECTION, SOLUTION INTRAVENOUS at 20:08

## 2018-01-01 RX ADMIN — SODIUM CHLORIDE 150 ML/HR: 900 INJECTION, SOLUTION INTRAVENOUS at 23:04

## 2018-01-01 RX ADMIN — Medication 10 ML: at 07:54

## 2018-01-01 RX ADMIN — VANCOMYCIN HYDROCHLORIDE 1750 MG: 5 INJECTION, POWDER, LYOPHILIZED, FOR SOLUTION INTRAVENOUS at 11:38

## 2018-01-01 RX ADMIN — MORPHINE SULFATE 1 MG: 2 INJECTION, SOLUTION INTRAMUSCULAR; INTRAVENOUS at 19:55

## 2018-01-01 RX ADMIN — ONDANSETRON 4 MG: 2 INJECTION, SOLUTION INTRAMUSCULAR; INTRAVENOUS at 15:48

## 2018-01-01 RX ADMIN — DEXTROSE MONOHYDRATE 50 ML: 25 INJECTION, SOLUTION INTRAVENOUS at 08:57

## 2018-01-01 RX ADMIN — MORPHINE SULFATE 1 MG: 2 INJECTION, SOLUTION INTRAMUSCULAR; INTRAVENOUS at 16:12

## 2018-01-01 RX ADMIN — MORPHINE SULFATE 1 MG: 2 INJECTION, SOLUTION INTRAMUSCULAR; INTRAVENOUS at 05:27

## 2018-01-01 RX ADMIN — MORPHINE SULFATE 1 MG: 2 INJECTION, SOLUTION INTRAMUSCULAR; INTRAVENOUS at 15:52

## 2018-01-01 RX ADMIN — LORAZEPAM 0.5 MG: 2 INJECTION INTRAMUSCULAR; INTRAVENOUS at 08:22

## 2018-01-01 RX ADMIN — MORPHINE SULFATE 1 MG: 2 INJECTION, SOLUTION INTRAMUSCULAR; INTRAVENOUS at 20:00

## 2018-01-01 RX ADMIN — SODIUM CHLORIDE 500 ML: 9 INJECTION, SOLUTION INTRAVENOUS at 20:09

## 2018-01-01 RX ADMIN — AZTREONAM 2 G: 2 INJECTION, POWDER, LYOPHILIZED, FOR SOLUTION INTRAMUSCULAR; INTRAVENOUS at 10:53

## 2018-01-01 RX ADMIN — BUDESONIDE AND FORMOTEROL FUMARATE DIHYDRATE 2 PUFF: 160; 4.5 AEROSOL RESPIRATORY (INHALATION) at 08:31

## 2018-01-01 RX ADMIN — MORPHINE SULFATE 1 MG: 2 INJECTION, SOLUTION INTRAMUSCULAR; INTRAVENOUS at 08:23

## 2018-01-01 RX ADMIN — MORPHINE SULFATE 1 MG: 2 INJECTION, SOLUTION INTRAMUSCULAR; INTRAVENOUS at 11:31

## 2018-01-01 RX ADMIN — IPRATROPIUM BROMIDE AND ALBUTEROL SULFATE 3 ML: 2.5; .5 SOLUTION RESPIRATORY (INHALATION) at 19:49

## 2018-01-01 RX ADMIN — HEPARIN SODIUM 5000 UNITS: 5000 INJECTION, SOLUTION INTRAVENOUS; SUBCUTANEOUS at 20:09

## 2018-01-01 RX ADMIN — SCOPALAMINE 1 PATCH: 1 PATCH, EXTENDED RELEASE TRANSDERMAL at 17:32

## 2018-01-01 RX ADMIN — SODIUM CHLORIDE: 900 INJECTION, SOLUTION INTRAVENOUS at 10:49

## 2018-01-01 RX ADMIN — SODIUM CHLORIDE 1 G: 9 INJECTION INTRAMUSCULAR; INTRAVENOUS; SUBCUTANEOUS at 03:30

## 2018-01-01 RX ADMIN — ACETAMINOPHEN 650 MG: 325 TABLET ORAL at 09:00

## 2018-01-01 RX ADMIN — MORPHINE SULFATE 1 MG: 2 INJECTION, SOLUTION INTRAMUSCULAR; INTRAVENOUS at 11:59

## 2018-01-01 RX ADMIN — MORPHINE SULFATE 1 MG: 2 INJECTION, SOLUTION INTRAMUSCULAR; INTRAVENOUS at 02:02

## 2018-01-01 RX ADMIN — ATROPINE SULFATE 2 DROP: 10 SOLUTION/ DROPS OPHTHALMIC at 08:23

## 2018-01-01 RX ADMIN — MORPHINE SULFATE 1 MG: 2 INJECTION, SOLUTION INTRAMUSCULAR; INTRAVENOUS at 04:33

## 2018-01-01 RX ADMIN — HEPARIN SODIUM 5000 UNITS: 5000 INJECTION, SOLUTION INTRAVENOUS; SUBCUTANEOUS at 08:44

## 2018-01-01 RX ADMIN — SODIUM CHLORIDE 150 ML/HR: 900 INJECTION, SOLUTION INTRAVENOUS at 07:15

## 2018-01-01 RX ADMIN — LORAZEPAM 0.5 MG: 2 INJECTION INTRAMUSCULAR; INTRAVENOUS at 03:07

## 2018-01-01 RX ADMIN — LORAZEPAM 0.5 MG: 2 INJECTION INTRAMUSCULAR; INTRAVENOUS at 04:33

## 2018-01-01 RX ADMIN — ATROPINE SULFATE 2 DROP: 10 SOLUTION/ DROPS OPHTHALMIC at 07:56

## 2018-01-01 RX ADMIN — ATROPINE SULFATE 2 DROP: 10 SOLUTION/ DROPS OPHTHALMIC at 05:18

## 2018-01-01 RX ADMIN — SODIUM CHLORIDE 125 ML/HR: 900 INJECTION, SOLUTION INTRAVENOUS at 17:45

## 2018-01-01 RX ADMIN — ATROPINE SULFATE 2 DROP: 10 SOLUTION/ DROPS OPHTHALMIC at 19:54

## 2018-01-01 RX ADMIN — IPRATROPIUM BROMIDE AND ALBUTEROL SULFATE 3 ML: 2.5; .5 SOLUTION RESPIRATORY (INHALATION) at 08:07

## 2018-01-01 RX ADMIN — MORPHINE SULFATE 1 MG: 2 INJECTION, SOLUTION INTRAMUSCULAR; INTRAVENOUS at 23:26

## 2018-02-02 PROBLEM — A41.9 SEPSIS (HCC): Status: ACTIVE | Noted: 2018-01-01

## 2018-02-02 PROBLEM — N39.0 UTI (URINARY TRACT INFECTION): Status: ACTIVE | Noted: 2018-01-01

## 2018-02-02 PROBLEM — J69.0 ASPIRATION PNEUMONIA (HCC): Status: ACTIVE | Noted: 2018-01-01

## 2018-02-02 NOTE — H&P
[facility] Medicine Admission      Date of Admission: 2/2/2018      Primary Care Physician: Mingo Rodriguez MD      Chief Complaint: Was sent today from nursing home process because of lethargy    HPI: Condition started yesterday patient vomited yesterday his condition continued to progress to the morning patient was found to be lethargic , confused, non-arousable for which patient was transferred to our hospital from [facility] in ER patient was found to be hypotensive lethargic having +3 bacteria in urine , lactate is 5.7 and decreased blood pressure systolic blood pressure is in 70.  Also patient creatinine 11 is 3 Baseline creatinine is 1.08 last October 2017  Case discussed in detail with daughter  at bedside  Patient is lethargic doesn't provide any information  Patient is DNR    Concurrent Medical History:  has a past medical history of Agoraphobia with panic disorder; Amputation of leg, right, traumatic; Anemia; Anxiety disorder; Astigmatism; Cataract; Cellulitis of abdominal wall; Cerebral atherosclerosis; Chronic bronchitis; Chronic ischemic heart disease, unspecified; Chronic pain syndrome; Chronic respiratory failure; Constipation; Contracture of hand joint; COPD (chronic obstructive pulmonary disease); Cystitis; Demyelinating disease of central nervous system; Disturbances of salivary secretion; Dysphagia; Essential (primary) hypertension; Gastrostomy malfunction; Gastrostomy status; GERD (gastroesophageal reflux disease); Hemiplegia and hemiparesis following unspecified cerebrovascular disease affecting left non-dominant side; Hydronephrosis; Hyperlipidemia; Hypertensive chronic kidney disease; Insomnia; Major depressive disorder, single episode; Mild cognitive impairment; Obesity; Obstructive sleep apnea; Other specified cardiac arrhythmias; Peripheral vascular disease; Presbyopia; Suicidal ideation; Tinea unguium; Unspecified sequelae of  unspecified cerebrovascular disease; Urinary calculi; Urinary retention; UTI (urinary tract infection); and Vitreous degeneration.    Past Surgical History:  has a past surgical history that includes Cystoscopy (N/A, 8/13/2017).    Family History: family history is not on file.  Unable to obtain patient is lethargic does not provide information    Social History:  reports that he does not drink alcohol or use illicit drugs.    Allergies:   Allergies   Allergen Reactions   • Penicillins        Medications:   (Not in a hospital admission)    Review of Systems:  Review of Systems   Otherwise complete ROS is negative except as mentioned above.  Patient is lethargic doesn't provide any information    Physical Exam:   Temp:  [100.1 °F (37.8 °C)] 100.1 °F (37.8 °C)  Heart Rate:  [] 100  Resp:  [20-44] 36  BP: (75-98)/(51-61) 86/51  Physical Exam   Constitutional: He appears well-developed. He appears distressed.   HENT:   Head: Normocephalic.   Right Ear: External ear normal.   Left Ear: External ear normal.   Nose: Nose normal.   Eyes: EOM are normal. Pupils are equal, round, and reactive to light. Right eye exhibits no discharge. Left eye exhibits no discharge.   Neck: Normal range of motion. Neck supple.   Cardiovascular: Normal rate, regular rhythm and normal heart sounds.  Exam reveals no friction rub.    Pulmonary/Chest: He is in respiratory distress. He has no wheezes. He has rales (bilaterally rhonchi). He exhibits no tenderness.   Musculoskeletal: He exhibits no edema or tenderness.   Neurological:   Patient is lethargic     Skin: Skin is warm and dry. No rash noted.         Results Reviewed:  I have personally reviewed current lab, radiology, and data and agree with results.  Lab Results (last 24 hours)     Procedure Component Value Units Date/Time    Blood Culture - Blood, [[id number]] Collected:  02/02/18 0936    Specimen:  Blood from Arm, Left Updated:  02/02/18 0943    Calcium, Ionized [[id number]]   (Normal) Collected:  02/02/18 0945    Specimen:  Blood Updated:  02/02/18 0947     Ionized Calcium 4.90 mg/dL     CBC Auto Differential [[id number]]  (Abnormal) Collected:  02/02/18 0942    Specimen:  Blood Updated:  02/02/18 0954     WBC 24.61 (H) 10*3/mm3      RBC 5.04 10*6/mm3      Hemoglobin 13.7 g/dL      Hematocrit 42.9 %      MCV 85.1 fL      MCH 27.2 pg      MCHC 31.9 g/dL      RDW 19.3 (H) %      RDW-SD 61.0 (H) fl      MPV -- fL       Instrument unable to calculate mpv results        Platelets 128 (L) 10*3/mm3      Neutrophil % 87.5 (H) %      Lymphocyte % 3.1 (L) %      Monocyte % 6.5 %      Eosinophil % 0.0 %      Basophil % 0.1 %      Immature Grans % 2.8 (H) %      Neutrophils, Absolute 21.53 (H) 10*3/mm3      Lymphocytes, Absolute 0.76 10*3/mm3      Monocytes, Absolute 1.61 (H) 10*3/mm3      Eosinophils, Absolute 0.00 10*3/mm3      Basophils, Absolute 0.03 10*3/mm3      Immature Grans, Absolute 0.68 (H) 10*3/mm3      nRBC 0.0 /100 WBC     Blood Culture - Blood, [[id number]] Collected:  02/02/18 0952    Specimen:  Blood from Hand, Right Updated:  02/02/18 1005    CBC & Differential [[id number]] Collected:  02/02/18 0942    Specimen:  Blood Updated:  02/02/18 1013    Narrative:       The following orders were created for panel order CBC & Differential.  Procedure                               Abnormality         Status                     ---------                               -----------         ------                     Scan Slide[[id number]]                                       Final result               CBC Auto Differential[[id number]]        Abnormal            Final result                 Please view results for these tests on the individual orders.    Scan Slide [[id number]] Collected:  02/02/18 0942    Specimen:  Blood Updated:  02/02/18 1013     Anisocytosis Slight/1+      Few target cells        WBC Morphology Normal     Platelet Estimate Decreased      Few giant platelets seen       Protime-INR  [[id number]]  (Abnormal) Collected:  02/02/18 0945    Specimen:  Blood Updated:  02/02/18 1024     Protime 15.4 (H) Seconds      INR 1.22 (H)    Narrative:       Therapeutic range for most indications is 2.0-3.0 INR,  or 2.5-3.5 for mechanical heart valves.    aPTT [[id number]]  (Abnormal) Collected:  02/02/18 0945    Specimen:  Blood Updated:  02/02/18 1024     PTT 42.6 (H) seconds     Narrative:       The recommended Heparin therapeutic range is 68-97 seconds.    Comprehensive Metabolic Panel [[id number]]  (Abnormal) Collected:  02/02/18 0945    Specimen:  Blood Updated:  02/02/18 1034     Glucose 68 mg/dL      BUN 50 (H) mg/dL      Creatinine 3.03 (H) mg/dL      Sodium 136 (L) mmol/L      Potassium 4.3 mmol/L      Chloride 100 mmol/L      CO2 21.0 (L) mmol/L      Calcium 10.1 mg/dL      Total Protein 7.3 g/dL      Albumin 3.50 g/dL      ALT (SGPT) 49 U/L      AST (SGOT) 65 (H) U/L      Alkaline Phosphatase 272 (H) U/L      Total Bilirubin 1.5 (H) mg/dL      eGFR Non African Amer 20 (L) mL/min/1.73      Globulin 3.8 (H) gm/dL      A/G Ratio 0.9 (L) g/dL      BUN/Creatinine Ratio 16.5     Anion Gap 15.0 mmol/L     Narrative:       The MDRD GFR formula is only valid for adults with stable renal function between ages 18 and 70.    Magnesium [[id number]]  (Abnormal) Collected:  02/02/18 1028    Specimen:  Blood Updated:  02/02/18 1038     Magnesium 2.7 (H) mg/dL     Phosphorus [[id number]]  (Abnormal) Collected:  02/02/18 1028    Specimen:  Blood Updated:  02/02/18 1038     Phosphorus 2.2 (L) mg/dL     Tiff Draw [[id number]] Collected:  02/02/18 0942    Specimen:  Blood Updated:  02/02/18 1046    Narrative:       The following orders were created for panel order Tiff Draw.  Procedure                               Abnormality         Status                     ---------                               -----------         ------                     Light Blue Top[[id number]]                                                               Green Top (Gel)[[id number]]                                  Final result               Lavender Top[[id number]]                                     Final result               Gold Top - SST[[id number]]                                                                Please view results for these tests on the individual orders.    Green Top (Gel) [[id number]] Collected:  02/02/18 0942    Specimen:  Blood Updated:  02/02/18 1046     Extra Tube Hold for add-ons.      Auto resulted.       Lavender Top [[id number]] Collected:  02/02/18 0942    Specimen:  Blood Updated:  02/02/18 1046     Extra Tube hold for add-on      Auto resulted       Extra Tubes [[id number]] Collected:  02/02/18 0945    Specimen:  Blood from Blood, Venous Line Updated:  02/02/18 1046    Narrative:       The following orders were created for panel order Extra Tubes.  Procedure                               Abnormality         Status                     ---------                               -----------         ------                     Lavender Top[[id number]]                                     Final result               Gold Top - SST[[id number]]                                   Final result               Green Top (Gel)[[id number]]                                  Final result                 Please view results for these tests on the individual orders.    Lavender Top [[id number]] Collected:  02/02/18 0945    Specimen:  Blood Updated:  02/02/18 1046     Extra Tube hold for add-on      Auto resulted       Gold Top - SST [[id number]] Collected:  02/02/18 0945    Specimen:  Blood Updated:  02/02/18 1046     Extra Tube Hold for add-ons.      Auto resulted.       Green Top (Gel) [[id number]] Collected:  02/02/18 0945    Specimen:  Blood Updated:  02/02/18 1046     Extra Tube Hold for add-ons.      Auto resulted.       C-reactive Protein [[id number]]  (Abnormal) Collected:  02/02/18 1028    Specimen:  Blood Updated:  02/02/18 1052     C-Reactive  Protein 26.00 (H) mg/dL     Blood Gas, Arterial [[id number]]  (Abnormal) Collected:  02/02/18 1108    Specimen:  Arterial Blood Updated:  02/02/18 1119     Site --      Not performed at this site.        Ananth's Test --      Not performed at this site.        pH, Arterial 7.385 pH units      pCO2, Arterial 33.0 (L) mm Hg      pO2, Arterial 68.4 (L) mm Hg      HCO3, Arterial 19.3 (L) mmol/L      Base Excess, Arterial -4.9 (L) mmol/L      O2 Saturation, Arterial 93.2 %      Hemoglobin, Blood Gas 11.7 (L) g/dL      Hematocrit, Blood Gas 34.0 (L) %      CO2 Content 20.3 (L)     Sodium, Arterial 139.2 mmol/L      Potassium, Arterial 3.83 mmol/L      Glucose, Arterial 73 mmol/L      Barometric Pressure for Blood Gas -- mmHg       Not performed at this site.        Modality --      Not performed at this site.        Ionized Calcium 4.80 mg/dL     Urine Culture - Urine, Urine, Clean Catch [[id number]] Collected:  02/02/18 1104    Specimen:  Urine from Urine, Catheter Updated:  02/02/18 1124    Lactic Acid, Reflex Timer (This will reflex a repeat order 3-3:15 hours after ordered.) [[id number]] Collected:  02/02/18 0945    Specimen:  Blood Updated:  02/02/18 1125    Lactic Acid, Plasma [[id number]]  (Abnormal) Collected:  02/02/18 0945    Specimen:  Blood Updated:  02/02/18 1125     Lactate 5.7 (C) mmol/L     Influenza Antigen, Rapid - Swab, Nasopharynx [[id number]]  (Normal) Collected:  02/02/18 1101    Specimen:  Swab from Nasopharynx Updated:  02/02/18 1131     Influenza A Ag, EIA Negative     Influenza B Ag, EIA Negative    Urinalysis With / Culture If Indicated - Urine, Catheter [[id number]]  (Abnormal) Collected:  02/02/18 1104    Specimen:  Urine from Urine, Catheter Updated:  02/02/18 1137     Color, UA Yellow     Appearance, UA Cloudy (A)     pH, UA 7.0     Specific Gravity, UA 1.015     Glucose, UA Negative     Ketones, UA Negative     Bilirubin, UA Negative     Blood, UA Moderate (2+) (A)     Protein,  mg/dL (2+)  (A)     Leuk Esterase, UA Large (3+) (A)     Nitrite, UA Negative     Urobilinogen, UA 1.0 E.U./dL    Urinalysis, Microscopic Only - Urine, Clean Catch [[id number]]  (Abnormal) Collected:  02/02/18 1104    Specimen:  Urine from Urine, Catheter Updated:  02/02/18 1147     RBC, UA Too Numerous to Count (A) /HPF      WBC, UA Too Numerous to Count (A) /HPF      Bacteria, UA 3+ (A) /HPF      Squamous Epithelial Cells, UA  /HPF      Unable to determine due to loaded field (A)     Hyaline Casts, UA None Seen /LPF      Methodology Manual Light Microscopy        Imaging Results (last 24 hours)     Procedure Component Value Units Date/Time    XR Chest 1 View [[id number]] Collected:  02/02/18 0928     Updated:  02/02/18 1013    Narrative:         PROCEDURE: Single chest view AP    REASON FOR EXAM:Severe sepsis protocol    FINDINGS: Comparison exam dated August 13, 2017. Cardiac and  pulmonary vasculature are normal. Left lung base retrocardiac  small linear opacity. Lungs otherwise clear. Pleural spaces are  normal. No acute osseous abnormality.      Impression:       1.  Left lung base retrocardiac small linear opacities suspicious  for discoid atelectasis.  2.  Otherwise unremarkable chest.    Electronically signed by:  Star Jimenez MD  2/2/2018 10:12 AM CST  Workstation: [id number]            Assessment:    Hospital Problem List     Acute kidney injury    Overview Addendum 8/17/2017  7:30 PM by Jose Phillips MD     Creatinine improved to 1.57 .  Dr. Reynaga is on board.            Sepsis    UTI (urinary tract infection)    Aspiration pneumonia                Plan:Admit patient to ICU  Also x-ray does not show pneumonia but x-ray may lag for 48-72 hours but the patient vomited yesterday and also having bilateral crackles on his lung base today with sepsis most probably patient has aspiration pneumonia  If systolic blood pressure less than 90 and/or map less than 65 start levophed   Blood culture  Urine culture  Sputum culture  Broad  spectrum antibiotic vancomycin and aztreonam pharmacy to dose patient is allergic to penicillin  Ultrasound kidney to rule out obstruction  Nephrology consult Dr. garcia called  Adjust medication to kidney function  Known nephrotoxicity  No contrast material  Repeat lactic acid level  Comprehensive metabolic panel, CBC in a.m.  Aspiration precautions  Nothing by mouth  Patient CODE STATUS is DO NOT RESUSCITATE  Case discussed in detail with her daughter at bedside      I discussed the patients findings and my recommendations with: Daughter at bedside    Malissa Chi MD  02/02/18  12:26 PM

## 2018-02-02 NOTE — ED NOTES
Repositioned pt to left side.  No incontinence at this time.      Antoinette Dhillon RN  02/02/18 [id number]

## 2018-02-02 NOTE — ED PROVIDER NOTES
Subjective   HPI Comments: Nursing home states patient had an episode of vomiting yesterday, and this morning patient became confused and lethargic.    Patient is a 77 y.o. male presenting with vomiting and fever.   Vomiting   The primary symptoms include fever, fatigue and vomiting. Primary symptoms do not include diarrhea or rash. The illness began today.   The illness is also significant for chills.   Fever   Temp source:  Oral  Onset quality:  Gradual  Duration:  1 day  Timing:  Constant  Progression:  Waxing and waning  Chronicity:  New  Worsened by:  Nothing  Ineffective treatments:  None tried  Associated symptoms: chills, confusion, somnolence and vomiting    Associated symptoms: no diarrhea and no rash        Review of Systems   Unable to perform ROS: Mental status change   Constitutional: Positive for chills, diaphoresis, fatigue and fever.   HENT: Negative for drooling, ear discharge and nosebleeds.    Respiratory: Negative for shortness of breath.    Gastrointestinal: Positive for vomiting. Negative for diarrhea.   Skin: Negative for rash.   Neurological: Negative for seizures.   Psychiatric/Behavioral: Positive for confusion.       Past Medical History:   Diagnosis Date   • Agoraphobia with panic disorder    • Amputation of leg, right, traumatic    • Anemia    • Anxiety disorder    • Astigmatism    • Cataract    • Cellulitis of abdominal wall    • Cerebral atherosclerosis    • Chronic bronchitis    • Chronic ischemic heart disease, unspecified    • Chronic pain syndrome    • Chronic respiratory failure    • Constipation    • Contracture of hand joint    • COPD (chronic obstructive pulmonary disease)    • Cystitis    • Demyelinating disease of central nervous system    • Disturbances of salivary secretion    • Dysphagia    • Essential (primary) hypertension    • Gastrostomy malfunction    • Gastrostomy status    • GERD (gastroesophageal reflux disease)    • Hemiplegia and hemiparesis following  unspecified cerebrovascular disease affecting left non-dominant side    • Hydronephrosis    • Hyperlipidemia    • Hypertensive chronic kidney disease    • Insomnia    • Major depressive disorder, single episode    • Mild cognitive impairment    • Obesity    • Obstructive sleep apnea    • Other specified cardiac arrhythmias    • Peripheral vascular disease    • Presbyopia    • Suicidal ideation    • Tinea unguium    • Unspecified sequelae of unspecified cerebrovascular disease    • Urinary calculi    • Urinary retention    • UTI (urinary tract infection)    • Vitreous degeneration        Allergies   Allergen Reactions   • Penicillins        Past Surgical History:   Procedure Laterality Date   • CYSTOSCOPY N/A 8/13/2017    Procedure: CYSTOSCOPY WITH CATHETER PLACEMENT;  Surgeon: Christian Crawford MD;  Location: [facility];  Service:        History reviewed. No pertinent family history.    Social History     Social History   • Marital status:      Spouse name: N/A   • Number of children: N/A   • Years of education: N/A     Social History Main Topics   • Smoking status: Unknown If Ever Smoked   • Smokeless tobacco: None   • Alcohol use No   • Drug use: No   • Sexual activity: Defer     Other Topics Concern   • None     Social History Narrative           Objective   Physical Exam   Constitutional: He is oriented to person, place, and time. He appears well-developed and well-nourished. He appears lethargic. He has a sickly appearance. He appears distressed.   HENT:   Head: Normocephalic and atraumatic.   Nose: Nose normal.   Mouth/Throat: Oropharynx is clear and moist. Mucous membranes are dry. No oropharyngeal exudate.   Eyes: Conjunctivae and EOM are normal. Pupils are equal, round, and reactive to light. Right eye exhibits no discharge. Left eye exhibits no discharge. No scleral icterus.   Neck: Normal range of motion. Neck supple. No tracheal deviation present.   Cardiovascular: Normal rate, regular rhythm and  normal heart sounds.    No murmur heard.  Pulmonary/Chest: Effort normal and breath sounds normal. No stridor. No respiratory distress. He has no wheezes. He has no rales.   Abdominal: Soft. Bowel sounds are normal. He exhibits no distension and no mass. There is no tenderness. There is no rebound and no guarding.   Musculoskeletal: He exhibits no edema.   Neurological: He is oriented to person, place, and time. He appears lethargic. Coordination normal. GCS eye subscore is 4. GCS verbal subscore is 1. GCS motor subscore is 5.   Skin: Skin is warm. No rash noted. He is diaphoretic. No erythema.   Psychiatric: He has a normal mood and affect. His behavior is normal. Thought content normal.   Nursing note and vitals reviewed.      ECG 12 Lead    Date/Time: 2/2/2018 12:11 PM  Performed by: INNA GENAO  Authorized by: INNA GENAO   Interpreted by physician  Rhythm: sinus tachycardia  Rate: tachycardic  BPM: 109  Conduction: incomplete RBBB  ST Segments: ST segments normal                 ED Course  ED Course          Labs Reviewed   COMPREHENSIVE METABOLIC PANEL - Abnormal; Notable for the following:        Result Value    BUN 50 (*)     Creatinine 3.03 (*)     Sodium 136 (*)     CO2 21.0 (*)     AST (SGOT) 65 (*)     Alkaline Phosphatase 272 (*)     Total Bilirubin 1.5 (*)     eGFR Non African Amer 20 (*)     Globulin 3.8 (*)     A/G Ratio 0.9 (*)     All other components within normal limits    Narrative:     The MDRD GFR formula is only valid for adults with stable renal function between ages 18 and 70.   PROTIME-INR - Abnormal; Notable for the following:     Protime 15.4 (*)     INR 1.22 (*)     All other components within normal limits    Narrative:     Therapeutic range for most indications is 2.0-3.0 INR,  or 2.5-3.5 for mechanical heart valves.   APTT - Abnormal; Notable for the following:     PTT 42.6 (*)     All other components within normal limits    Narrative:     The recommended Heparin  therapeutic range is 68-97 seconds.   MAGNESIUM - Abnormal; Notable for the following:     Magnesium 2.7 (*)     All other components within normal limits   PHOSPHORUS - Abnormal; Notable for the following:     Phosphorus 2.2 (*)     All other components within normal limits   LACTIC ACID, PLASMA - Abnormal; Notable for the following:     Lactate 5.7 (*)     All other components within normal limits   C-REACTIVE PROTEIN - Abnormal; Notable for the following:     C-Reactive Protein 26.00 (*)     All other components within normal limits   BLOOD GAS, ARTERIAL - Abnormal; Notable for the following:     pCO2, Arterial 33.0 (*)     pO2, Arterial 68.4 (*)     HCO3, Arterial 19.3 (*)     Base Excess, Arterial -4.9 (*)     Hemoglobin, Blood Gas 11.7 (*)     Hematocrit, Blood Gas 34.0 (*)     CO2 Content 20.3 (*)     All other components within normal limits   URINALYSIS W/ CULTURE IF INDICATED - Abnormal; Notable for the following:     Appearance, UA Cloudy (*)     Blood, UA Moderate (2+) (*)     Protein,  mg/dL (2+) (*)     Leuk Esterase, UA Large (3+) (*)     All other components within normal limits   CBC WITH AUTO DIFFERENTIAL - Abnormal; Notable for the following:     WBC 24.61 (*)     RDW 19.3 (*)     RDW-SD 61.0 (*)     Platelets 128 (*)     Neutrophil % 87.5 (*)     Lymphocyte % 3.1 (*)     Immature Grans % 2.8 (*)     Neutrophils, Absolute 21.53 (*)     Monocytes, Absolute 1.61 (*)     Immature Grans, Absolute 0.68 (*)     All other components within normal limits   URINALYSIS, MICROSCOPIC ONLY - Abnormal; Notable for the following:     RBC, UA Too Numerous to Count (*)     WBC, UA Too Numerous to Count (*)     Bacteria, UA 3+ (*)     Squamous Epithelial Cells, UA Unable to determine due to loaded field (*)     All other components within normal limits   INFLUENZA ANTIGEN, RAPID - Normal   CALCIUM, IONIZED - Normal   BLOOD CULTURE   BLOOD CULTURE   URINE CULTURE   SCAN SLIDE   RAINBOW DRAW    Narrative:      The following orders were created for panel order [place] Draw.  Procedure                               Abnormality         Status                     ---------                               -----------         ------                     Light Blue Top[[id number]]                                                              Green Top (Gel)[[id number]]                                  Final result               Lavender Top[[id number]]                                     Final result               Gold Top - SST[[id number]]                                                                Please view results for these tests on the individual orders.   LACTIC ACID REFLEX TIMER   CBC AND DIFFERENTIAL    Narrative:     The following orders were created for panel order CBC & Differential.  Procedure                               Abnormality         Status                     ---------                               -----------         ------                     Scan Slide[[id number]]                                       Final result               CBC Auto Differential[[id number]]        Abnormal            Final result                 Please view results for these tests on the individual orders.   GREEN TOP   LAVENDER TOP   EXTRA TUBES    Narrative:     The following orders were created for panel order Extra Tubes.  Procedure                               Abnormality         Status                     ---------                               -----------         ------                     Lavender Top[[id number]]                                     Final result               Gold Top - SST[[id number]]                                   Final result               Green Top (Gel)[[id number]]                                  Final result                 Please view results for these tests on the individual orders.   LAVENDER TOP   GOLD TOP - SST   GREEN TOP   LIGHT BLUE TOP   GOLD TOP - SST       XR Chest 1 View   Final Result   1.  Left  lung base retrocardiac small linear opacities suspicious   for discoid atelectasis.   2.  Otherwise unremarkable chest.      Electronically signed by:  Star Jimenez MD  2/2/2018 10:12 AM [facility]   Workstation: [id number]                    [facility]  Number of Diagnoses or Management Options  Acute renal failure, unspecified acute renal failure type:   Hypotension, unspecified hypotension type:   Sepsis, due to unspecified organism:   Urinary tract infection with hematuria, site unspecified:      Amount and/or Complexity of Data Reviewed  Clinical lab tests: ordered and reviewed  Tests in the radiology section of CPT®: ordered and reviewed  Tests in the medicine section of CPT®: ordered and reviewed  Obtain history from someone other than the patient: yes  Discuss the patient with other providers: yes  Independent visualization of images, tracings, or specimens: yes    Risk of Complications, Morbidity, and/or Mortality  Presenting problems: moderate  Diagnostic procedures: moderate  Management options: moderate    Critical Care  Total time providing critical care: 30-74 minutes    Patient Progress  Patient progress: stable      Final diagnoses:   Sepsis, due to unspecified organism   Urinary tract infection with hematuria, site unspecified   Hypotension, unspecified hypotension type   Acute renal failure, unspecified acute renal failure type            Sarkis Roy MD  02/02/18 1214       Sarkis Roy MD  02/02/18 1221

## 2018-02-03 PROBLEM — R65.21 SEPTIC SHOCK (HCC): Status: ACTIVE | Noted: 2018-01-01

## 2018-02-03 PROBLEM — A41.9 SEPTIC SHOCK (HCC): Status: ACTIVE | Noted: 2018-01-01

## 2018-02-03 PROBLEM — R78.81 BACTEREMIA: Status: ACTIVE | Noted: 2018-01-01

## 2018-02-03 NOTE — PROGRESS NOTES
TWO PATIENT IDENTIFIERS WERE USED. CONSENT WAS SIGNED PER FAMILY EDUCATION MATERIAL WAS GIVEN TO PATIENT AND / OR FAMILY. THE PATIENT WAS DRAPED WITH FULL BODY DRAPE AND PATIENT'S RIGHT ARM WAS PREPPED WITH CHLORAPREP.  ULTRASOUND WAS USED TO LOCALIZE THERIGHT BASILIC  VEIN. SUBCUTANEOUS TISSUE AT THE CATHETER SITE WAS INFILTRATED WITH 2% LIDOCAINE. UNDER ULTRASOUND GUIDANCE, THE VEIN WAS ACCESSED WITH A 21GAUGE  NEEDLE. AN 0.018 WIRE WAS THEN THREADED THROUGH THE NEEDLE INTO THE CENTRAL VENOUS SYSTEM. THE 21GAUGE  NEEDLE WAS REMOVED AND A 4 Urdu PEEL AWAY SHEATH WAS PLACED OVER THE WIRE. THE PICC LINE CATHETER WAS CUT AT 44 CM. THE PICC LINE CATHETER WAS THEN PLACED OVER THE WIRE INTO THE VEIN, THE SHEATH WAS PEELED AWAY,WIRE WAS REMOVED. CATHETER WAS FLUSHED WITH NORMAL SALINE AND TIPS APPLIED. BIOPATCH PLACED. CATHETER SECURED WITH STATLOCK AND TEGADERM. PATIENT TOLERATED PROCEDURE WELL. THIS WAS DONE IN THE   ICU      IMPRESSION: SUCCESSFUL PLACEMENT OF TRIPLE LUMEN SOLO PICC        Jareth Stone  2/3/2018  10:47 AM

## 2018-02-03 NOTE — PROGRESS NOTES
[facility] Medicine Services  INPATIENT PROGRESS NOTE    Length of Stay: 1  Date of Admission: 2/2/2018  Primary Care Physician: Mingo Rodriguez MD    Subjective     Chief Complaint   Patient presents with   • Altered Mental Status     HPI:  77 year old male who was presented to ED with complain lethargy, confusion, non-arousable for which he was transfer to ED from [facility]. Pt was found to be hypotensive, with lactic acid of 5.7, BP in 70s, cr of 3 with baseline in 1s. Family which the POA son and daughter who were present on the beside. Pt and family wish for pt to be comfortable. They do not wish to continue with any further life prolonging measure. They do not want pt to have dialysis either.   I have discussed with nurse, family and nephrology in detail. Everyone in agree with comfort care.     Review of Systems   Constitutional: Positive for chills, fatigue and fever.   Respiratory: Positive for cough. Negative for shortness of breath and wheezing.    Cardiovascular: Negative for chest pain and leg swelling.   Musculoskeletal: Positive for back pain.        All pertinent negatives and positives are as above. All other systems have been reviewed and are negative unless otherwise stated.     Objective      Vital Sign Min/Max for last 24 hours  Temp  Min: 97.5 °F (36.4 °C)  Max: 102.6 °F (39.2 °C)   BP  Min: 85/53  Max: 152/69   Pulse  Min: 91  Max: 129   Resp  Min: 20  Max: 36   SpO2  Min: 90 %  Max: 98 %   Flow (L/min)  Min: 3  Max: 4   Weight  Min: 102 kg (223 lb 15.8 oz)  Max: 102 kg (223 lb 15.8 oz)         Physical Exam   Constitutional: He appears well-developed and well-nourished. He appears lethargic. He appears toxic. He has a sickly appearance. He appears ill. No distress.   Cardiovascular: Normal rate and regular rhythm.    Hypotensive BP 78/53 with HR of 115.    Pulmonary/Chest: He has decreased breath sounds. He has rhonchi. He has rales.   Neurological: He  appears lethargic. He is disoriented.   Vitals reviewed.      Current Facility-Administered Medications   Medication Dose Route Frequency Provider Last Rate Last Dose   • acetaminophen (TYLENOL) tablet 650 mg  650 mg Oral Q6H PRN Malissa Chi MD   650 mg at 02/03/18 0900    Or   • acetaminophen (TYLENOL) suppository 650 mg  650 mg Rectal Q6H PRN Malissa Chi MD       • LORazepam (ATIVAN) injection 0.5 mg  0.5 mg Intravenous Q4H PRN Jose Phillips MD       • morphine injection 1 mg  1 mg Intravenous Q4H PRN Jose Phillips MD   1 mg at 02/03/18 1131   • Scopolamine (TRANSDERM-SCOP) 1.5 MG/3DAYS patch 1 patch  1 patch Transdermal Q72H Jose Phillips MD       • sodium chloride 0.9 % flush 1-10 mL  1-10 mL Intravenous PRN Malissa Chi MD       • sodium chloride 0.9 % flush 10 mL  10 mL Intravenous PRN Sarkis Roy MD               Results Review:  I have reviewed the labs, radiology results, and diagnostic studies.    Laboratory Data:     Results from last 7 days  Lab Units 02/03/18  0755 02/02/18  1108 02/02/18  0945   SODIUM mmol/L 142  --  136*   SODIUM, ARTERIAL mmol/L  --  139.2  --    POTASSIUM mmol/L 4.0  --  4.3   CHLORIDE mmol/L 112*  --  100   CO2 mmol/L 20.0*  --  21.0*   BUN mg/dL 59*  --  50*   CREATININE mg/dL 3.11*  --  3.03*   GLUCOSE mg/dL 47*  --  68   GLUCOSE, ARTERIAL mmol/L  --  73  --    CALCIUM mg/dL 8.7  --  10.1   BILIRUBIN mg/dL 0.8  --  1.5*   ALK PHOS U/L 190*  --  272*   ALT (SGPT) U/L 43  --  49   AST (SGOT) U/L 55  --  65*   ANION GAP mmol/L 10.0  --  15.0     Estimated Creatinine Clearance: 24.9 mL/min (by C-G formula based on Cr of 3.11).    Results from last 7 days  Lab Units 02/03/18  0755 02/02/18  1028   MAGNESIUM mg/dL  --  2.7*   PHOSPHORUS mg/dL 1.4* 2.2*           Results from last 7 days  Lab Units 02/03/18  0755 02/02/18  0942   WBC 10*3/mm3 17.05* 24.61*   HEMOGLOBIN g/dL 10.7* 13.7   HEMATOCRIT % 34.1* 42.9   PLATELETS 10*3/mm3 69* 128*       Results from last 7  days  Lab Units 02/02/18  0945   INR  1.22*       Results from last 7 days  Lab Units 02/02/18  1028   CRP mg/dL 26.00*       Culture Data:   Blood Culture   Date Value Ref Range Status   02/02/2018 Abnormal Stain (A)  Preliminary   02/02/2018 No growth at less than 24 hours  Preliminary     Urine Culture   Date Value Ref Range Status   02/02/2018 >100,000 CFU/mL Gram Negative Bacilli (A)  Preliminary     No results found for: RESPCX  No results found for: WOUNDCX  No results found for: STOOLCX  No components found for: BODYFLD      Radiology Data:   Imaging Results (last 24 hours)     Procedure Component Value Units Date/Time    US Renal Bilateral [[id number]] Collected:  02/02/18 1730     Updated:  02/02/18 2034    Narrative:       EXAM DESCRIPTION: BILATERAL RENAL ULTRASOUND    CLINICAL HISTORY: Acute renal failure.    COMPARISON: 8/13/2017    FINDINGS:  Technologist reports difficulty portable exam related  to patient body habitus and inability to turn or take deep  breaths.    The right kidney measures 10.2 x 6.1 x 6.1 cm.  The cortical echotexture appears slightly hyperechoic compared to  the adjacent liver may represent a degree of medical renal  disease. No obvious cortical thinning appreciated.  There is evidence of an approximate 2.4 cm cyst projecting from  the superior pole of the right kidney.  No obvious shadowing nephrolithiasis or perinephric fluid  collection    The left kidney measures 12.3 x 5.0 x 6.4 cm.  The left kidney is even more difficult to image within the right.  There is increased cortical echotexture which may represent  medical renal disease. There is suggestion of fluid splaying the  renal pelvis and possibly medullary fat representing mild  hydronephrosis. No obvious perinephric fluid collection or cystic  mass.      Impression:       Limited examination as detailed above.    There are findings suggesting medical renal disease with  increased cortical echogenicity.    Approximate 2.4  cm cyst superior pole right kidney.    Findings questioning mild hydronephrosis on the left.    Within the limitations of this study no evidence of shadowing  nephrolithiasis.         Electronically signed by:  Mingo Dobbs MD  2/2/2018 8:33 PM CST  Workstation: [id number]    XR Chest 1 View [[id number]] Collected:  02/03/18 0417     Updated:  02/03/18 0532    Narrative:         Chest single view on  2/3/2018     CLINICAL INDICATION: Shortness of breath    COMPARISON: 2/2/2018    FINDINGS: Borderline cardiomegaly is noted. There are bibasilar  opacities consistent with likely atelectasis although cannot  exclude component of pneumonia. Patient's face and chin obscures  the left lung apex. Hilar and mediastinal contours are within  normal limits.      Impression:       Bibasilar opacities consistent with atelectasis or  pneumonia.    Electronically signed by:  Ted Jo  2/3/2018 5:31 AM CST  Workstation: [id number]    IR PICC wo fluoro guidance [[id number]] Resulted:  02/03/18 1048     Updated:  02/03/18 1048    Narrative:       This procedure was auto-finalized with no dictation required.    US Guided Vascular Access [[id number]] Resulted:  02/03/18 1049     Updated:  02/03/18 1049    Narrative:       This procedure was auto-finalized with no dictation required.    XR Chest Post CVA Port [[id number]] Collected:  02/03/18 1046     Updated:  02/03/18 1117    Narrative:         PROCEDURE: Single chest view AP    REASON FOR EXAM:picc follow up, A41.9 Sepsis, unspecified  organism N39.0 Urinary tract infection, site not specified R31.9  Hematuria, unspecified I95.9 Hypotension, unspecified N17.9 Acute  kidney failure, unspecified    FINDINGS: Comparison exam dated February 3, 2018. Right PICC line  with tip overlying the SVC. Mild cardiomegaly. Pulmonary  vasculature appears within normal limits. Lungs are clear.  Pleural spaces are normal. No acute osseous abnormality.      Impression:       1.  Right PICC line  with tip overlying the SVC.  2.  Mild cardiomegaly without decompensation.  3.  Otherwise negative chest.    Electronically signed by:  Star Jimenez MD  2/3/2018 11:16 AM CST  Workstation: [id number]          I have reviewed the patient current medications.     Assessment/Plan     Active Hospital Problems (** Indicates Principal Problem)    Diagnosis Date Noted   • Septic shock [A41.9, R65.21] 02/03/2018   • Bacteremia [R78.81] 02/03/2018   • Sepsis [A41.9] 02/02/2018   • UTI (urinary tract infection) [N39.0] 02/02/2018   • Aspiration pneumonia [J69.0] 02/02/2018   • Acute kidney injury [N17.9] 08/14/2017     Creatinine improved to 1.57 .  Dr. Reynaga is on board.        • Pneumonia of both lungs due to infectious organism [J18.9] 08/13/2017     Day 4 of levaquin   Neb treatment.           Resolved Hospital Problems    Diagnosis Date Noted Date Resolved   No resolved problems to display.     Septic shock, Acute cystitis, pneumonia, bacteremia, family and pt request for pt to be comfortable. They do not wish any further aggressive treatment. No further life prolonging treatment. Will change the code status to comfort measure. Transfer to medical floor. Give morphine for pain, scopolamine for secretions, ativan for anxiety, oxygen PRN for comfort.        Discharge Planning: I expect patient to be discharged to 1-2 days.      DVT Prophylaxis: comfort care               This document has been electronically signed by Jose Phillips MD on February 3, 2018 11:33 AM

## 2018-02-03 NOTE — CONSULTS
Nutrition Services    Patient Name:  Rex Anthony  YOB: 1940  MRN: [id number]  Admit Date:  2/2/2018    RD visited pt due to the fact pt was on total tube feeding at the nursing home due to dysphagia with hx of CVA.  Pressure ulcer also present on admit.  Pt dxed with ELOISE, Septic shock, and Metabolic Acidosis.  Bun and Creat significantly elevated.  RD was going to make tube feeding recommendations but per nsg staff and MD--Family does not wish any further aggressive tx.  No further life prolonging treatment.  Pt will be comfort care.  RD will be available if needed.      Electronically signed by:  Grace Campo RD  02/03/18 12:53 PM

## 2018-02-03 NOTE — PLAN OF CARE
Problem: Patient Care Overview (Adult)  Goal: Plan of Care Review  Outcome: Ongoing (interventions implemented as appropriate)   02/03/18 9998   Coping/Psychosocial Response Interventions   Plan Of Care Reviewed With patient   Patient Care Overview   Progress declining   Outcome Evaluation   Outcome Summary/Follow up Plan patient appears to be resting comfortably     Goal: Adult Individualization and Mutuality  Outcome: Ongoing (interventions implemented as appropriate)    Goal: Discharge Needs Assessment  Outcome: Ongoing (interventions implemented as appropriate)      Problem: Pressure Ulcer (Adult)  Goal: Signs and Symptoms of Listed Potential Problems Will be Absent or Manageable (Pressure Ulcer)  Outcome: Ongoing (interventions implemented as appropriate)      Problem: Sepsis (Adult)  Goal: Signs and Symptoms of Listed Potential Problems Will be Absent or Manageable (Sepsis)  Outcome: Ongoing (interventions implemented as appropriate)      Problem: Fall Risk (Adult)  Goal: Identify Related Risk Factors and Signs and Symptoms  Outcome: Outcome(s) achieved Date Met: 02/03/18    Goal: Absence of Falls  Outcome: Ongoing (interventions implemented as appropriate)      Problem: Grieving (Adult)  Goal: Identify Related Risk Factors and Signs and Symptoms  Outcome: Outcome(s) achieved Date Met: 02/03/18    Goal: Knowledge of Grief and Grieving  Outcome: Ongoing (interventions implemented as appropriate)      Problem: Dying Patient, Actively (Adult)  Goal: Identify Related Risk Factors and Signs and Symptoms  Outcome: Outcome(s) achieved Date Met: 02/03/18    Goal: Comfort/Pain Control  Outcome: Ongoing (interventions implemented as appropriate)    Goal: Dying Process, Peace and Dignity  Outcome: Ongoing (interventions implemented as appropriate)

## 2018-02-03 NOTE — CONSULTS
[facility]  [address] [place] [address]. [address]   - [phone]  F  [phone]     Consultation         PATIENT  DEMOGRAPHICS   PATIENT NAME: Rex Anthony                      PHYSICIAN: Irma Reynaga MD  : 1940  MRN: [id number]    Subjective   SUBJECTIVE   Referring Provider: Dr Chi  Reason for Consultation: ELOISE  History of present illness:     Mr. Anthony is a 77-year-old gentleman with a past medical history of obstructive uropathy with neurogenic bladder and prior suprapubic catheter.  He has acute kidney injury back in August / 2017 which he recovered and creatinine came down to 1.0.  He has now you catheter at nursing home    Patient is chronically on tube feeding.  Yesterday there was possible aspiration and had hypoxia and this morning he has low blood pressure and septic.  He was therefore brought here to the ER.  He also has increasing confusion and lethargy and unable to arouse at the nursing home.    On arrival here his blood pressure was in 70s systolic.  His lactate is 5.7.  We have been asked to evaluate for creatinine of 3.03.  His baseline creatinine is close to 1.0.  Patient has received 2 L of IV fluid bolus and is currently on normal saline at maintenance drip.  He is more much better now and blood pressures up to 110 systolic.    Past Medical History:   Diagnosis Date   • Agoraphobia with panic disorder    • Amputation of leg, right, traumatic    • Anemia    • Anxiety disorder    • Astigmatism    • Cataract    • Cellulitis of abdominal wall    • Cerebral atherosclerosis    • Chronic bronchitis    • Chronic ischemic heart disease, unspecified    • Chronic pain syndrome    • Chronic respiratory failure    • Constipation    • Contracture of hand joint    • COPD (chronic obstructive pulmonary disease)    • Cystitis    • Demyelinating disease of central nervous system    • Disturbances of salivary secretion    • Dysphagia    • Essential  "(primary) hypertension    • Gastrostomy malfunction    • Gastrostomy status    • GERD (gastroesophageal reflux disease)    • Hemiplegia and hemiparesis following unspecified cerebrovascular disease affecting left non-dominant side    • Hydronephrosis    • Hyperlipidemia    • Hypertensive chronic kidney disease    • Insomnia    • Major depressive disorder, single episode    • Mild cognitive impairment    • Obesity    • Obstructive sleep apnea    • Other specified cardiac arrhythmias    • Peripheral vascular disease    • Presbyopia    • Suicidal ideation    • Tinea unguium    • Unspecified sequelae of unspecified cerebrovascular disease    • Urinary calculi    • Urinary retention    • UTI (urinary tract infection)    • Vitreous degeneration      Past Surgical History:   Procedure Laterality Date   • CYSTOSCOPY N/A 8/13/2017    Procedure: CYSTOSCOPY WITH CATHETER PLACEMENT;  Surgeon: Christian Crawford MD;  Location: [facility];  Service:      History reviewed. No pertinent family history.  Social History   Substance Use Topics   • Smoking status: Former Smoker   • Smokeless tobacco: Never Used   • Alcohol use No     Allergies:  Penicillins     REVIEW OF SYSTEMS    Review of Systems   Unable to perform ROS: Acuity of condition       Objective   OBJECTIVE   Vital Signs  Temp:  [97.7 °F (36.5 °C)-100.1 °F (37.8 °C)] 97.7 °F (36.5 °C)  Heart Rate:  [] 98  Resp:  [20-44] 20  BP: ()/(51-70) 107/57    Flowsheet Rows         First Filed Value    Admission Height  190.5 cm (75\") Documented at 02/02/2018 1615    Admission Weight  93.5 kg (206 lb 1.6 oz) Documented at 02/02/2018 0913                PHYSICAL EXAM    Physical Exam   Constitutional: He appears well-developed.   HENT:   Head: Normocephalic.   Eyes: Pupils are equal, round, and reactive to light.   Cardiovascular: Normal rate, regular rhythm and normal heart sounds.    Pulmonary/Chest: Effort normal and breath sounds normal.   Abdominal: Soft. Bowel sounds " are normal.   Neurological: He is alert. He exhibits normal muscle tone.       RESULTS   Results Review:      Results from last 7 days  Lab Units 02/02/18  1108 02/02/18  0945   SODIUM mmol/L  --  136*   SODIUM, ARTERIAL mmol/L 139.2  --    POTASSIUM mmol/L  --  4.3   CHLORIDE mmol/L  --  100   CO2 mmol/L  --  21.0*   BUN mg/dL  --  50*   CREATININE mg/dL  --  3.03*   CALCIUM mg/dL  --  10.1   BILIRUBIN mg/dL  --  1.5*   ALK PHOS U/L  --  272*   ALT (SGPT) U/L  --  49   AST (SGOT) U/L  --  65*   GLUCOSE mg/dL  --  68   GLUCOSE, ARTERIAL mmol/L 73  --        Estimated Creatinine Clearance: 25.6 mL/min (by C-G formula based on Cr of 3.03).      Results from last 7 days  Lab Units 02/02/18  1028   MAGNESIUM mg/dL 2.7*   PHOSPHORUS mg/dL 2.2*               Results from last 7 days  Lab Units 02/02/18  0942   WBC 10*3/mm3 24.61*   HEMOGLOBIN g/dL 13.7   PLATELETS 10*3/mm3 128*         Results from last 7 days  Lab Units 02/02/18  0945   INR  1.22*        MEDICATIONS      albumin human 500 mL Intravenous Once   aztreonam 1 g Intravenous Q8H   budesonide-formoterol 2 puff Inhalation BID - RT   heparin (porcine) 5,000 Units Subcutaneous Q12H   ipratropium-albuterol 3 mL Nebulization 4x Daily - RT   Pharmacy Consult  Does not apply Daily   Pharmacy to dose vancomycin  Does not apply Daily   potassium phosphate 15 mmol Intravenous Once   sodium chloride 500 mL Intravenous Once       norepinephrine 0.02-0.3 mcg/kg/min    sodium chloride 150 mL/hr Last Rate: 150 mL/hr (02/02/18 3761)     Prescriptions Prior to Admission   Medication Sig Dispense Refill Last Dose   • albuterol (PROVENTIL HFA;VENTOLIN HFA) 108 (90 BASE) MCG/ACT inhaler Inhale 2 puffs Every 4 (Four) Hours As Needed for Wheezing.   Taking   • albuterol (PROVENTIL) (5 MG/ML) 0.5% nebulizer solution Take 2.5 mg by nebulization Every 6 (Six) Hours As Needed for Wheezing.   Taking   • ALPRAZolam (XANAX) 0.25 MG tablet Take 0.25 mg by mouth Daily.   Taking   • Amino  Acids-Protein Hydrolys (PRO-STAT AWC) liquid Take  by mouth.   Taking   • budesonide-formoterol (SYMBICORT) 160-4.5 MCG/ACT inhaler Inhale 2 puffs 2 (Two) Times a Day. 1 inhaler 0 Taking   • CloNIDine (CATAPRES) 0.2 MG tablet Take 0.2 mg by mouth 1 (One) Time Per Week.   Taking   • docusate sodium (COLACE) 150 MG/15ML liquid Take 50 mg by mouth Daily.   Taking   • dry mouth gel (BIOTENE ORALBALANCE) gel Apply  to the mouth or throat 3 (Three) Times a Day As Needed for dry mouth.   Taking   • DULoxetine (CYMBALTA) 60 MG capsule Take 60 mg by mouth Daily.      • fentaNYL (DURAGESIC) 25 MCG/HR patch Place 1 patch on the skin Every 72 (Seventy-Two) Hours.   Taking   • furosemide (LASIX) 20 MG tablet Take 20 mg by mouth 2 (Two) Times a Day.   Taking   • HYDROcodone-acetaminophen (NORCO)  MG per tablet Take 1 tablet by mouth Every 4 (Four) Hours As Needed for Moderate Pain (4-6).   Taking   • Hydrocortisone (PATRICK'S MAGIC BUTT CREAM) Apply 1 application topically 2 (Two) Times a Day.   Taking   • magnesium hydroxide (MILK OF MAGNESIA) 400 MG/5ML suspension Take  by mouth 2 (Two) Times a Day As Needed for Constipation.   Taking   • nortriptyline (PAMELOR) 50 MG capsule Take 50 mg by mouth Every Night.   Taking   • omeprazole in sodium bicarbonate injection 8.4% Take 10 mg by mouth Daily.   Taking   • polyethylene glycol (MIRALAX) packet Take 17 g by mouth Daily.   Taking   • promethazine (PHENERGAN) 25 MG tablet Take 25 mg by mouth Every 4 (Four) Hours.   Taking   • Sodium Phosphates (FLEET ENEMA) 7-19 GM/118ML enema Insert  into the rectum 1 (One) Time As Needed.   Taking   • sulfamethoxazole-trimethoprim (BACTRIM DS,SEPTRA DS) 800-160 MG per tablet Take 1 tablet by mouth 2 (Two) Times a Day.      • tamsulosin (FLOMAX) 0.4 MG capsule 24 hr capsule Take 1 capsule by mouth Daily. 30 capsule 0 Taking   • amLODIPine (NORVASC) 10 MG tablet Take 10 mg by mouth Daily.   Taking   • FLUoxetine (PROzac) 40 MG capsule Take 40  mg by mouth 2 (Two) Times a Day.   Taking     Assessment/Plan   ASSESSMENT / PLAN    Active Problems:    Acute kidney injury    Sepsis    UTI (urinary tract infection)    Aspiration pneumonia    1.acute kidney injury.  Patient has a baseline creatinine of 1.0-1.1.  He has prior episode of acute kidney injury back in August / September 2017.  This is likely secondary to pre renal azotemia or early ATN.  Off note he is also on Bactrim at the nursing home    At this point I will continue with IV fluid resuscitation.  I'll continue normal saline at 1 50 cc per hour.  I will give the albumin ×2.  I'll check fractional excretion of sodium.  We'll review the ultrasound of the kidney once available I have discussed with the daughter and she doesn't want any renal replacement therapy.  This is discussed in the last admission and patient doesn't want it.    2.history of obstructive uropathy and neurogenic bladder with prior suprapubic catheter which is now removed.  Patient has now a Casanova catheter in place.    3.aspiration pneumonia plus minus UTI.  Patient is on broad-spectrum antibiotic.    Thank you for the referral will continue to folle the patient in the hospital stay.         I discussed the patients findings and my recommendations with patient, family and nursing staff         This document has been electronically signed by Irma Reynaga MD on February 2, 2018 6:54 PM

## 2018-02-04 PROBLEM — N39.0 E-COLI UTI: Status: ACTIVE | Noted: 2018-01-01

## 2018-02-04 PROBLEM — B96.20 E-COLI UTI: Status: ACTIVE | Noted: 2018-01-01

## 2018-02-04 PROBLEM — N17.9 ACUTE RENAL FAILURE (HCC): Status: ACTIVE | Noted: 2018-01-01

## 2018-02-04 NOTE — PROGRESS NOTES
Progress Note  Alexsander Whitten MD  Hospitalist    Date of visit: 2/4/2018     LOS: 2 days   Patient Care Team:  Mingo Rodriguez MD as PCP - General    Chief Complaint: comatose    Subjective     Interval History:     Patient Complaints: non verbal    History taken from: family    Medication Review:   Current Facility-Administered Medications   Medication Dose Route Frequency Provider Last Rate Last Dose   • acetaminophen (TYLENOL) tablet 650 mg  650 mg Oral Q6H PRN Malissa Chi MD   650 mg at 02/03/18 0900    Or   • acetaminophen (TYLENOL) suppository 650 mg  650 mg Rectal Q6H PRN Malissa Chi MD       • atropine 1 % ophthalmic solution 2 drop  2 drop Sublingual Q1H PRN Scott Logan MD   2 drop at 02/04/18 0823   • LORazepam (ATIVAN) injection 0.5 mg  0.5 mg Intravenous Q4H PRN Jose Phillips MD   0.5 mg at 02/04/18 0822   • morphine injection 1 mg  1 mg Intravenous Q4H PRN Jose Phillips MD   1 mg at 02/04/18 0823   • Scopolamine (TRANSDERM-SCOP) 1.5 MG/3DAYS patch 1 patch  1 patch Transdermal Q72H Jose Phillips MD   1 patch at 02/03/18 1732   • sodium chloride 0.9 % flush 1-10 mL  1-10 mL Intravenous PRN Malissa Chi MD       • sodium chloride 0.9 % flush 10 mL  10 mL Intravenous PRN Sarkis Roy MD           Review of Systems:   Review of Systems   Constitutional: Positive for fatigue and fever.   Respiratory: Positive for shortness of breath. Negative for wheezing.    Cardiovascular: Negative for chest pain.   Gastrointestinal: Negative for abdominal distention, abdominal pain, anal bleeding, nausea and vomiting.   Genitourinary: Negative for dysuria, frequency and urgency.   Musculoskeletal: Positive for back pain. Negative for arthralgias.   Skin: Positive for pallor.   Neurological: Positive for weakness.   Psychiatric/Behavioral: Positive for confusion. Negative for agitation and behavioral problems.   All other systems reviewed and are negative.      Objective     Vital Signs  Temp:  [98.8  °F (37.1 °C)-99.4 °F (37.4 °C)] 98.8 °F (37.1 °C)  Heart Rate:  [104-114] 104  Resp:  [22] 22  BP: ()/(51-68) 117/68    Physical Exam:  Physical Exam   Constitutional: He appears well-developed and well-nourished. He appears ill.   HENT:   Head: Normocephalic and atraumatic.   Eyes: EOM are normal. Pupils are equal, round, and reactive to light. No scleral icterus.   Neck: Normal range of motion. Neck supple.   Cardiovascular: Normal rate and regular rhythm.    Pulmonary/Chest: Effort normal. No respiratory distress.   Abdominal: Soft.   Musculoskeletal: He exhibits no edema.   Neurological: No cranial nerve deficit.   Skin: There is pallor.   Psychiatric:   comatose   Vitals reviewed.       Results Review:    Lab Results (last 24 hours)     Procedure Component Value Units Date/Time    Urine Culture - Urine, Urine, Clean Catch [[id number]]  (Abnormal)  (Susceptibility) Collected:  02/02/18 1104    Specimen:  Urine from Urine, Catheter Updated:  02/04/18 0714     Urine Culture --      >100,000 CFU/mL Escherichia coli (A)    Susceptibility      Escherichia coli     JANET     Amoxicillin + Clavulanate 16/8 ug/ml Intermediate     Ampicillin >16 ug/ml Resistant     Ampicillin + Sulbactam >16/8 ug/ml Resistant     Cefazolin <=8 ug/ml Susceptible     Cefoxitin <=8 ug/ml Susceptible     Ceftazidime <=1 ug/ml Susceptible     Ceftriaxone <=8 ug/ml Susceptible     Cefuroxime sodium 16 ug/ml Intermediate     Levofloxacin >4 ug/ml Resistant     Nitrofurantoin <=32 ug/ml Susceptible     Piperacillin + Tazobactam <=16 ug/ml Susceptible     Tetracycline >8 ug/ml Resistant     Trimethoprim + Sulfamethoxazole >2/38 ug/ml Resistant                    Blood Culture - Blood, [[id number]]  (Abnormal) Collected:  02/02/18 0936    Specimen:  Blood from Arm, Left Updated:  02/04/18 0802     Blood Culture --      Gram Negative Bacilli (A)     Isolated from Aerobic Bottle     Gram Stain Result Aerobic Bottle Gram negative bacilli      1 of  2 bottles positive       Blood Culture - Blood, [[id number]]  (Abnormal) Collected:  02/02/18 0952    Specimen:  Blood from Hand, Right Updated:  02/04/18 0802     Blood Culture --      Gram Negative Bacilli (A)     Gram Stain Result Aerobic Bottle Gram negative bacilli      2 of 2 bottles positive             Imaging Results (last 24 hours)     ** No results found for the last 24 hours. **          Assessment/Plan     Principal Problem:    Septic shock  Active Problems:    E-coli UTI    Acute renal failure    Continue with comfort care / pain medications    Alexsander Whitten MD  02/04/18  11:33 AM

## 2018-02-04 NOTE — PLAN OF CARE
Problem: Patient Care Overview (Adult)  Goal: Plan of Care Review  Outcome: Ongoing (interventions implemented as appropriate)   02/04/18 0303   Coping/Psychosocial Response Interventions   Plan Of Care Reviewed With patient   Patient Care Overview   Progress declining       Problem: Pressure Ulcer (Adult)  Goal: Signs and Symptoms of Listed Potential Problems Will be Absent or Manageable (Pressure Ulcer)  Outcome: Ongoing (interventions implemented as appropriate)      Problem: Sepsis (Adult)  Goal: Signs and Symptoms of Listed Potential Problems Will be Absent or Manageable (Sepsis)  Outcome: Ongoing (interventions implemented as appropriate)      Problem: Fall Risk (Adult)  Goal: Absence of Falls  Outcome: Ongoing (interventions implemented as appropriate)      Problem: Grieving (Adult)  Goal: Knowledge of Grief and Grieving  Outcome: Ongoing (interventions implemented as appropriate)      Problem: Dying Patient, Actively (Adult)  Goal: Comfort/Pain Control  Outcome: Ongoing (interventions implemented as appropriate)    Goal: Dying Process, Peace and Dignity  Outcome: Ongoing (interventions implemented as appropriate)

## 2018-02-04 NOTE — PLAN OF CARE
Problem: Pressure Ulcer (Adult)  Goal: Signs and Symptoms of Listed Potential Problems Will be Absent or Manageable (Pressure Ulcer)  Outcome: Ongoing (interventions implemented as appropriate)      Problem: Sepsis (Adult)  Goal: Signs and Symptoms of Listed Potential Problems Will be Absent or Manageable (Sepsis)  Outcome: Ongoing (interventions implemented as appropriate)      Problem: Fall Risk (Adult)  Goal: Absence of Falls  Outcome: Ongoing (interventions implemented as appropriate)      Problem: Grieving (Adult)  Goal: Knowledge of Grief and Grieving  Outcome: Ongoing (interventions implemented as appropriate)      Problem: Dying Patient, Actively (Adult)  Goal: Comfort/Pain Control  Outcome: Ongoing (interventions implemented as appropriate)    Goal: Dying Process, Peace and Dignity  Outcome: Ongoing (interventions implemented as appropriate)

## 2018-02-05 NOTE — DISCHARGE SUMMARY
DEATH SUMMARY    NAME: Rex Anthony   PHYSICIAN: Jeffrey Jimenez MD  : 1940  MRN: [id number]    ADMITTED: 2018   DISCHARGED: 18         TIME OF DEATH: 917   CONTACTED: No    AUTOPSY REQUESTED: No    ADMISSION DIAGNOSES:  Present on Admission:  • Septic shock  • Acute renal failure  • E-coli UTI    DISCHARGE DIAGNOSES:   Principal Problem:    Septic shock  Active Problems:    Acute renal failure    E-coli UTI      SERVICE: Medicine. Attending Jeffrey Jimenez MD    HISTORY OF PRESENT ILLNESS: *Copied from Malissa Saldana MD's H&P*  Condition started yesterday patient vomited yesterday his condition continued to progress to the morning patient was found to be lethargic , confused, non-arousable for which patient was transferred to our hospital from [facility] in ER patient was found to be hypotensive lethargic having +3 bacteria in urine , lactate is 5.7 and decreased blood pressure systolic blood pressure is in 70.  Also patient creatinine 11 is 3 Baseline creatinine is 1.08 last 2017  Case discussed in detail with daughter  at bedside  Patient is lethargic doesn't provide any information  Patient is DNR    HOSPITAL COURSE:  Active Hospital Problems (** Indicates Principal Problem)    Diagnosis Date Noted   • **Septic shock [A41.9, R65.21] 2018   • Acute renal failure [N17.9] 2018   • E-coli UTI [N39.0, B96.20] 2018      Resolved Hospital Problems    Diagnosis Date Noted Date Resolved   No resolved problems to display.     Patient was admitted with septic shock likely secondary to uti and/or aspiration pneumonia.  Aggressive measures initially were attempted with the exception of violating patient's DNR designation.  Patient did not respond to therapies and family opted to make patient comfortable and withdraw care.  Patient was made comfort measures while still in the ICU.  He was then transferred to the floor with appropriate comfort measures on board.   Patient passed peacefully on 2018 at 0917 with family at bedside.     DISCHARGE CONDITION:       Time: Discharge 35 min          This document has been electronically signed by Jeffrey Jimenez MD on 2018 12:48 PM

## 2018-02-05 NOTE — PLAN OF CARE
Problem: Patient Care Overview (Adult)  Goal: Plan of Care Review   18 1031   Outcome Evaluation   Outcome Summary/Follow up Plan Pt has ; [facility]

## 2024-10-30 NOTE — PROGRESS NOTES
"[facility]  [address] [place] [address]. [address]  T - [phone]  F - [phone]     Progress Note          PATIENT  DEMOGRAPHICS   PATIENT NAME: Rex Anthony                      PHYSICIAN: Malika Haile MD  : 1940  MRN: [id number]   LOS: 1 day    Patient Care Team:  Mingo Rodriguez MD as PCP - General  Subjective   SUBJECTIVE   Is hypotensive this morning. Appears confused and does not follow commands.         Objective   OBJECTIVE   Vital Signs  Temp:  [97.5 °F (36.4 °C)-102.6 °F (39.2 °C)] 102.6 °F (39.2 °C)  Heart Rate:  [] 120  Resp:  [20-36] 22  BP: ()/(50-72) 90/53    Flowsheet Rows         First Filed Value    Admission Height  190.5 cm (75\") Documented at 2018 1615    Admission Weight  93.5 kg (206 lb 1.6 oz) Documented at 2018 0913           I/O last 3 completed shifts:  In: 1474 [I.V.:1454; IV Piggyback:20]  Out: 300 [Urine:300]    PHYSICAL EXAM    Physical Exam   Constitutional: He appears well-developed. No distress.   Poorly nourished   HENT:   Head: Normocephalic and atraumatic.   Mouth/Throat: No oropharyngeal exudate.   Dry mucous membranes   Cardiovascular: Regular rhythm and normal heart sounds.  Exam reveals no gallop and no friction rub.    No murmur heard.  Tachycardic    Pulmonary/Chest: No respiratory distress. He has wheezes. He has no rales. He exhibits no tenderness.   Abdominal: Soft. Bowel sounds are normal. He exhibits no distension. There is no tenderness.   Musculoskeletal: He exhibits no edema or tenderness.   Right AKA   Neurological:   Does not follow commands   Skin: Skin is warm and dry. He is not diaphoretic.   Nursing note and vitals reviewed.      RESULTS   Results Review:      Results from last 7 days  Lab Units 18  0755 18  1108 18  0945   SODIUM mmol/L 142  --  136*   SODIUM, ARTERIAL mmol/L  --  139.2  --    POTASSIUM mmol/L 4.0  --  4.3   CHLORIDE mmol/L 112*  --  100   CO2 " Assessment/Plan :   Obesity. Ada is currently taking Wegovy and she is doing great. The referral for this appt was made after she had to discontinue the medication. Her primary care provider was able to appeal the insurance decision and she is back on the medication. She is doing great and she plans to increase to 1.7 mg this week. We discussed how Wegovy may be helping to decrease inflammation and help with her overall energy. I recommend that she continue with the medication and continue to work up to 2.4 mg weekly. We will plan on a follow-up appt in about 4 mos.  Hypothyroidism. We reviewed her previous laboratory results. Her thyroid levels were stable about a year ago. With her recent weight loss and weight gain, I would like to repeat laboratory testing. I will place an order today for repeat thyroid testing along with a TPO antibody. She will have the labs drawn tomorrow. We will follow-up as needed.    Due to the COVID 19 pandemic this visit was a telephone/video visit in order to help prevent spread of infection in this patient and the general population. The patient gave verbal consent for the visit today. I have independently reviewed and interpreted labs, imaging as indicated.       Distant Location (provider location):  Off-site  Mode of Communication:  Video Conference via [employer]  Chart review/prep time 10    Joined the call at 10/30/2024, 2:13:06 pm.  Left the call at 10/30/2024, 2:28:35 pm.  You were on the call for 15 minutes 28 seconds .  29 minutes spent on the date of the encounter doing chart review, history and exam, documentation and further activities as noted above.      Chief complaint:  Ada is a 50 year old female seen in consultation at the request of Dr. Rivera for worsening hyperglycemia.    I have reviewed Care Everywhere including [facility], [facility], [facility], [facility], [facility], [facility], [facility] , [facility], [place] lab reports, imaging reports and  "provider notes as indicated.      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 She also has a history of gestational diabetes during 1 of her 5 pregnancies. In recent years, she has struggled with glucose fluctuations with significant hypoglycemia. She states that she can never go more than a few hours without food. Over time, this constant need to eat or snack, led to weight gain. Last year, she scheduled a follow-up with her primary care provider to discuss her issues with weight. They decided to start Wegovy and she states that she finally felt \"normal.\"     Unfortunately, a few months ago her insurance stopped coverage. She had to stop the medication and that led to a 50 lbs weight gain. More importantly, she reports that a lot of her issues with pain and irritability returned. She states that she felt horrible. Recently, due to the increase in weight, she was able to restart Wegovy. She has been taking 1 mg weekly and she will increase to 1.7 mg weekly, next week. Again, she is doing great. Her energy level is up, she feels like her blood sugars are stable, and she has been able to lose weight.    Ada also has a history of hypothyroidism. She reports that she was on levothyroxine for many years. She is not sure why it was discontinued but she has been off of it for some time. She was discussing some of her ongoing issues with fatigue and inflammation and her primary care was worried that it may be related to her thyroid. She states that she does not believe that she was ever checked for Hashimoto's Thyroiditis but she assumes that she has it, due to its connection with Tom Danlos.    Endocrine relevant labs are as follows:   Latest Reference Range & Units 05/24/24 10:27   Hemoglobin A1C 0.0 - 5.6 % 5.4      Latest Reference Range & Units 12/15/23 11:19 " mmol/L 20.0*  --  21.0*   BUN mg/dL 59*  --  50*   CREATININE mg/dL 3.11*  --  3.03*   CALCIUM mg/dL 8.7  --  10.1   BILIRUBIN mg/dL 0.8  --  1.5*   ALK PHOS U/L 190*  --  272*   ALT (SGPT) U/L 43  --  49   AST (SGOT) U/L 55  --  65*   GLUCOSE mg/dL 47*  --  68   GLUCOSE, ARTERIAL mmol/L  --  73  --        Estimated Creatinine Clearance: 24.9 mL/min (by C-G formula based on Cr of 3.11).      Results from last 7 days  Lab Units 02/03/18  0755 02/02/18  1028   MAGNESIUM mg/dL  --  2.7*   PHOSPHORUS mg/dL 1.4* 2.2*         Results from last 7 days  Lab Units 02/03/18  0755 02/02/18  0942   WBC 10*3/mm3 17.05* 24.61*   HEMOGLOBIN g/dL 10.7* 13.7   PLATELETS 10*3/mm3 69* 128*         Results from last 7 days  Lab Units 02/02/18  0945   INR  1.22*         Imaging Results (last 24 hours)     Procedure Component Value Units Date/Time    XR Chest 1 View [[id number]] Collected:  02/02/18 0928     Updated:  02/02/18 1013    Narrative:         PROCEDURE: Single chest view AP    REASON FOR EXAM:Severe sepsis protocol    FINDINGS: Comparison exam dated August 13, 2017. Cardiac and  pulmonary vasculature are normal. Left lung base retrocardiac  small linear opacity. Lungs otherwise clear. Pleural spaces are  normal. No acute osseous abnormality.      Impression:       1.  Left lung base retrocardiac small linear opacities suspicious  for discoid atelectasis.  2.  Otherwise unremarkable chest.    Electronically signed by:  Star Jimenez MD  2/2/2018 10:12 AM CST  Workstation: [id number]     Renal Bilateral [[id number]] Collected:  02/02/18 1730     Updated:  02/02/18 2034    Narrative:       EXAM DESCRIPTION: BILATERAL RENAL ULTRASOUND    CLINICAL HISTORY: Acute renal failure.    COMPARISON: 8/13/2017    FINDINGS:  Technologist reports difficulty portable exam related  to patient body habitus and inability to turn or take deep  breaths.    The right kidney measures 10.2 x 6.1 x 6.1 cm.  The cortical echotexture appears slightly    Hemoglobin A1C <5.7 % 5.3      Latest Reference Range & Units 12/15/23 11:19   TSH 0.30 - 4.20 uIU/mL 1.90     REVIEW OF SYSTEMS    Endocrine: positive for obesity  Skin: negative  Eyes: negative for, visual blurring, redness, tearing  Ears/Nose/Throat: positive for postnasal drainage, persistent sore throat, negative for, hoarseness, feeling of fullness  Respiratory: No shortness of breath, dyspnea on exertion, cough, or hemoptysis  Cardiovascular: positive for POTS and tachycardia, negative for, chest pain, dyspnea on exertion, and lower extremity edema  Gastrointestinal: negative for, nausea, vomiting, constipation, and diarrhea  Genitourinary: negative for, nocturia, dysuria, frequency, and urgency  Musculoskeletal: positive for neck pain, arthritis, and joint stiffness, negative for, muscular weakness, and nocturnal cramping  Neurologic: positive for numbness or tingling of hands and numbness or tingling of feet  Psychiatric: positive for excessive stress-she is in the process of a divorce  Hematologic/Lymphatic/Immunologic: negative    Past Medical History  Past Medical History:   Diagnosis Date    Allergic rhinitis 09/2007    Anxiety     Arthritis 11/01/2013    Found during search for cause of back pain    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    Autonomic dysfunction     Bleeding disorder (H) 2016    Bruise easily    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    Cervicalgia     Chronic sinusitis 00/2007    Diagnosed with chronic pansinusitis 2016    Coagulation disorder (H)     factor 8    CSF leak     Chiari malformation    Depression     Tom-Danlos disease     Eosinophilic esophagitis 08/2018    Gastroesophageal reflux disease 3/1/2017    I have large hiatal hernia    Hiatal hernia 2016    Have adeline hiatel hernia    Hoarseness Unsure    It comes and goes    Hypertension     Hypothyroid     IBS (irritable bowel syndrome) with constipation     improved on Linzess     hyperechoic compared to  the adjacent liver may represent a degree of medical renal  disease. No obvious cortical thinning appreciated.  There is evidence of an approximate 2.4 cm cyst projecting from  the superior pole of the right kidney.  No obvious shadowing nephrolithiasis or perinephric fluid  collection    The left kidney measures 12.3 x 5.0 x 6.4 cm.  The left kidney is even more difficult to image within the right.  There is increased cortical echotexture which may represent  medical renal disease. There is suggestion of fluid splaying the  renal pelvis and possibly medullary fat representing mild  hydronephrosis. No obvious perinephric fluid collection or cystic  mass.      Impression:       Limited examination as detailed above.    There are findings suggesting medical renal disease with  increased cortical echogenicity.    Approximate 2.4 cm cyst superior pole right kidney.    Findings questioning mild hydronephrosis on the left.    Within the limitations of this study no evidence of shadowing  nephrolithiasis.         Electronically signed by:  Mingo Dobbs MD  2/2/2018 8:33 PM CST  Workstation: [id number]    XR Chest 1 View [[id number]] Collected:  02/03/18 0417     Updated:  02/03/18 0532    Narrative:         Chest single view on  2/3/2018     CLINICAL INDICATION: Shortness of breath    COMPARISON: 2/2/2018    FINDINGS: Borderline cardiomegaly is noted. There are bibasilar  opacities consistent with likely atelectasis although cannot  exclude component of pneumonia. Patient's face and chin obscures  the left lung apex. Hilar and mediastinal contours are within  normal limits.      Impression:       Bibasilar opacities consistent with atelectasis or  pneumonia.    Electronically signed by:  Ted Jo  2/3/2018 5:31 AM CST  Workstation: [id number]           MEDICATIONS      aztreonam 1 g Intravenous Q8H   budesonide-formoterol 2 puff Inhalation BID - RT   heparin (porcine) 5,000 Units  Immunosuppression (H) 3/1/2015    Common with Tom Danlos Syndrome    Irregular heart beat     Migraines 1/1/2007    Unsure of exact date    Nasal polyps 2013    Were revoved 03/2017, benign    Orthostatic hypotension     Other nervous system complications 1/2014    Autonomic nervous system disorder, neuralgia, neuropathy    Swelling, mass, or lump in head and neck 08/2016    Lymph node has been growing for last year    Thyroid disease 01/01/2008    Urinary incontinence     Urinary urgency        Medications  Current Outpatient Medications   Medication Sig Dispense Refill    acetaminophen (TYLENOL) 500 MG tablet Take 1,000 mg by mouth every 6 hours as needed for mild pain      albuterol (PROAIR HFA/PROVENTIL HFA/VENTOLIN HFA) 108 (90 Base) MCG/ACT inhaler Inhale 2 puffs into the lungs every 6 hours as needed for shortness of breath, wheezing or cough. 18 g 0    atorvastatin (LIPITOR) 20 MG tablet TAKE ONE TABLET BY MOUTH ONCE DAILY 90 tablet 3    benzonatate (TESSALON) 100 MG capsule Take 1 capsule (100 mg) by mouth 3 times daily as needed for cough. 30 capsule 0    benzonatate (TESSALON) 200 MG capsule Take 1 capsule (200 mg) by mouth 3 times daily as needed for cough. 45 capsule 0    buprenorphine (BUTRANS) 10 MCG/HR WK patch Place onto the skin once a week. 4 patch 1    butalbital-acetaminophen-caffeine (ESGIC) -40 MG tablet Take 1 tablet by mouth every 6 hours as needed for headaches 10 tablet 0    celecoxib (CELEBREX) 100 MG capsule TAKE ONE CAPSULE BY MOUTH TWICE A DAY AS NEEDED FOR MODERATE PAIN. TAKE WITH FOOD 60 capsule 2    cetirizine (ZYRTEC) 10 MG tablet Take 10 mg by mouth daily as needed       co-enzyme Q-10 100 MG CAPS capsule Take 100 mg by mouth daily      cyclobenzaprine (FLEXERIL) 10 MG tablet TAKE ONE TABLET BY MOUTH THREE TIMES A DAY AS NEEDED FOR MUSCLE SPASMS 60 tablet 0    DULoxetine (CYMBALTA) 30 MG capsule TAKE 2 CAPSULES BY MOUTH IN THE MORNING AND 1 CAPSULE BY MOUTH AT BEDTIME 270  Subcutaneous Q12H   ipratropium-albuterol 3 mL Nebulization 4x Daily - RT   Pharmacy Consult  Does not apply Daily   Pharmacy to dose vancomycin  Does not apply Daily   potassium phosphate 15 mmol Intravenous Once       norepinephrine 0.02-0.3 mcg/kg/min    sodium chloride 150 mL/hr Last Rate: 150 mL/hr (02/03/18 0715)       Assessment/Plan   ASSESSMENT / PLAN    Active Problems:    Acute kidney injury    Sepsis    UTI (urinary tract infection)    Aspiration pneumonia    1. Acute kidney injury- Oliguric:  Baseline creatinine of 1.0-1.1 mg/dl.    - He had prior acute kidney injury back in August / September 2017.    - Etiology of ELOISE is pre-renal from hypotension, sepsis leading to ATN.   - Creatinine and BUN continue to rise. Urine output is minimal at 300ml in the last 24 hours. Await urine studies. Renal US showed ?mild hydron on the left and 2.4 cm cyst on the right kidney. Right kidney measured 10.2 cm and left kidney measured 12.3 cm.   - Patient has expressed in the past that he does not want dialysis. Family in agreement with not pursuing dialysis.   - Will continue IVFs. Change to 0.45% saline at 100ml/hr  - Maintain hemodynamics. Monitor I&Os. Avoid nephrotoxins and NSAIDs.    2. Septic shock:  - Secondary to UTI and bacteremia (E.coli)  - Will need pressors. On antibiotics    3. Metabolic acidosis:  - Secondary to lactic acidosis  - Monitor    4. Hypophosphatemia:  - Will give 30mmol IV x 1    5. Anemia:  - Hemoglobin is acceptable at 10.7    6. History of obstructive uropathy and neurogenic bladder:  - Had suprapubic catheter which is now removed.  Patient has now a Casanova catheter in place.              This document has been electronically signed by Malika Haile MD on February 3, 2018 9:41 AM          capsule 1    EPINEPHrine (ANY BX GENERIC EQUIV) 0.3 MG/0.3ML injection 2-pack Inject 0.3 mLs (0.3 mg) into the muscle as needed for anaphylaxis May repeat one time in 5-15 minutes if response to initial dose is inadequate. 2 each 1    ferrous fumarate 65 mg, [place]. FE,-Vitamin C 125 mg (VITRON-C)  MG TABS tablet Take 1 tablet by mouth every other day 60 tablet 4    fluticasone (FLONASE) 50 MCG/ACT nasal spray SPRAY 2 SPRAYS INTO BOTH NOSTRILS DAILY 48 g 11    guanFACINE (TENEX) 1 MG tablet TAKE ONE TAB BY MOUTH ONCE NIGHTLY AT BEDTIME      HYDROmorphone (DILAUDID) 2 MG tablet Take 1 tablet (2 mg) by mouth 2 times daily 15 tablet 0    hydrOXYzine HCl (ATARAX) 25 MG tablet TAKE 1 TABLET (25 MG) BY MOUTH EVERY 6 HOURS AS NEEDED FOR ANXIETY 180 tablet 1    ibuprofen ([employer] IBUPROFEN) 200 MG capsule Take 600 mg by mouth every 4 hours as needed for fever      ivabradine (CORLANOR) 7.5 MG tablet Take 1 tablet (7.5 mg) by mouth 2 times daily 180 tablet 3    ketorolac (TORADOL) 10 MG tablet Take 1 tablet (10 mg) by mouth every 6 hours as needed for moderate pain. 6 tablet 0    Lidocaine (LIDOCARE) 4 % Patch Place 1-3 patches onto the skin every 24 hours To prevent lidocaine toxicity, patient should be patch free for 12 hrs daily. 20 patch 0    medical cannabis (Patient's own supply.  Not a prescription) Take 1 Dose by mouth See Admin Instructions Capsule formulation - uses as needed      methocarbamol (ROBAXIN) 500 MG tablet TAKE 1 TABLET (500 MG) BY MOUTH 4 TIMES DAILY 30 tablet 0    metoclopramide (REGLAN) 10 MG tablet Take 1 tablet (10 mg) by mouth 4 times daily as needed (headache) 40 tablet 3    metoclopramide (REGLAN) 5 MG tablet Take 1 tablet (5 mg) by mouth 3 times daily as needed (migraine). 12 tablet 0    Multiple Vitamins-Minerals (MULTIVITAMIN PO) Take 1 tablet by mouth daily       naloxone (NARCAN) 4 MG/0.1ML nasal spray Spray 1 spray (4 mg) into one nostril alternating nostrils once as needed for opioid  reversal every 2-3 minutes until assistance arrives 0.2 mL 0    phentermine 30 MG capsule Take 1 capsule (30 mg) by mouth every morning. 30 capsule 2    Probiotic Product (PROBIOTIC DAILY PO) Take 6 packets by mouth every morning Five-Lac      propranolol ER (INDERAL LA) 120 MG 24 hr capsule Take 1 capsule (120 mg) by mouth daily 90 capsule 3    ramelteon (ROZEREM) 8 MG tablet Take 1 tablet (8 mg) by mouth at bedtime. 30 tablet 5    rizatriptan (MAXALT-MLT) 5 MG ODT TAKE 1-2 TABLETS (5-10 MG) BY MOUTH AT ONSET OF HEADACHE FOR MIGRAINE (MAX 30 MG IN 24 HOURS) 18 tablet 11    Semaglutide-Weight Management (WEGOVY) 1 MG/0.5ML pen Inject 1 mg subcutaneously once a week. 2 mL 1    Semaglutide-Weight Management (WEGOVY) 1.7 MG/0.75ML pen Inject 1.7 mg subcutaneously once a week. 3 mL 0    SUMAtriptan (IMITREX STATDOSE) 6 MG/0.5ML pen injector kit Inject 0.5 ml (6 mg) subcutaneously at onset of migraine, May repeat in 1 hour , Max 12 mg/24 hrs 2 kit 11    topiramate (TOPAMAX) 100 MG tablet Take 1 tablet (100 mg) by mouth daily 90 tablet 3    traMADol (ULTRAM) 50 MG tablet TAKE ONE TABLET BY MOUTH EVERY 6 HOURS AS NEEDED FOR PAIN 65 tablet 0    vitamin B complex with vitamin C (STRESS TAB) tablet Take 1 tablet by mouth daily      VITAMIN D, CHOLECALCIFEROL, PO Take 2,000 Units by mouth daily      WEGOVY 0.5 MG/0.5ML pen INJECT 0.5 MG UNDER THE SKIN ONCE A WEEK 2 mL 0       Allergies  Allergies   Allergen Reactions    Baclofen Other (See Comments)     Loss of muscle tone. Muscle weakness.    Bee Venom Shortness Of Breath, Palpitations, Anaphylaxis and Difficulty breathing     Patient does carry Epi-Pen    Chicken-Derived Products (Egg) Nausea and Diarrhea    Compazine [Prochlorperazine]      Extreme jittery    Food      Milk    Gabapentin      Dizzy    Gluten Meal GI Disturbance     Wheat bran and wheat    Pregabalin     Seasonal Allergies      Dust, mold       Family History  family history includes Asthma in her sister and  son; Cancer in her father, paternal grandfather, and paternal grandmother; Connective Tissue Disorder in her mother and sister; Diabetes in her maternal grandmother, paternal grandfather, and paternal grandmother; Heart Disease in her maternal grandmother; Hypertension in her maternal grandmother; Migraines in her father, sister, sister, and sister; Thyroid Disease in her mother, sister, sister, and sister.    Social History  Social History     Tobacco Use    Smoking status: Former     Current packs/day: 0.00     Average packs/day: 0.2 packs/day for 22.9 years (4.6 ttl pk-yrs)     Types: Cigarettes     Start date: 1/1/1991     Quit date: 12/1/2013     Years since quitting: 10.9     Passive exposure: Past    Smokeless tobacco: Never   Vaping Use    Vaping status: Never Used   Substance Use Topics    Alcohol use: Yes     Comment: Occasionally    Drug use: Yes     Types: Marijuana     Comment: medical marijuana       Physical Exam  There is no height or weight on file to calculate BMI.  GENERAL: no distress  SKIN: Visible skin clear. No significant rash, abnormal pigmentation or lesions.  EYES: Eyes grossly normal to inspection.  No discharge or erythema, or obvious scleral/conjunctival abnormalities.  NECK: visible goiter is not present; no visible neck masses  RESP: No audible wheeze, cough, or visible cyanosis.  No visible retractions or increased work of breathing.    NEURO: Awake, alert, responds appropriately to questions.  Mentation and speech fluent.  PSYCH:affect normal and appearance well-groomed.      DATA REVIEW  She only monitors her blood sugars as needed

## (undated) DEVICE — CATH FOL COUNCL 2WY 16F 5CC

## (undated) DEVICE — ENDOSCOPIC SEAL URO 1 SIZE FITS ALL: Brand: ENDOSCOPIC SEAL

## (undated) DEVICE — URETERAL ACCESS SHEATH SET: Brand: NAVIGATOR HD

## (undated) DEVICE — SOL IRRG H2O PL/BG 1000ML STRL

## (undated) DEVICE — SOL IRR H2O BTL 1000ML STRL

## (undated) DEVICE — GLV SURG NEOLON 2G PF LF 6.5 STRL

## (undated) DEVICE — GLV SURG NEOLON 2G PF LF 7.5 STRL

## (undated) DEVICE — SOL PVPI SPRY BETADINE 3OZ

## (undated) DEVICE — CATH FOL COUNCL 2WY 18F 5CC

## (undated) DEVICE — CONTAINER,SPECIMEN,OR STERILE,4OZ: Brand: MEDLINE

## (undated) DEVICE — GW PTFE FIX/CORE FLXTIP .038 3X150CM

## (undated) DEVICE — PK CYSTO LF 60

## (undated) DEVICE — INTRO CATH SUPRAPUB LAWRENCE 16FORNG